# Patient Record
Sex: MALE | Race: WHITE | Employment: UNEMPLOYED | URBAN - METROPOLITAN AREA
[De-identification: names, ages, dates, MRNs, and addresses within clinical notes are randomized per-mention and may not be internally consistent; named-entity substitution may affect disease eponyms.]

---

## 2017-07-21 ENCOUNTER — HOSPITAL ENCOUNTER (EMERGENCY)
Age: 42
Discharge: HOME | End: 2017-07-21
Attending: EMERGENCY MEDICINE
Payer: MEDICAID

## 2017-07-21 VITALS
DIASTOLIC BLOOD PRESSURE: 72 MMHG | RESPIRATION RATE: 16 BRPM | TEMPERATURE: 98.2 F | SYSTOLIC BLOOD PRESSURE: 138 MMHG | OXYGEN SATURATION: 100 % | HEART RATE: 97 BPM

## 2017-07-21 DIAGNOSIS — L23.7 CONTACT DERMATITIS DUE TO POISON IVY: Primary | ICD-10-CM

## 2017-07-21 PROCEDURE — 6370000000 HC RX 637 (ALT 250 FOR IP): Performed by: FAMILY MEDICINE

## 2017-07-21 PROCEDURE — 99282 EMERGENCY DEPT VISIT SF MDM: CPT

## 2017-07-21 RX ORDER — PREDNISONE 10 MG/1
10 TABLET ORAL DAILY
Qty: 5 TABLET | Refills: 0 | Status: SHIPPED | OUTPATIENT
Start: 2017-08-02 | End: 2017-07-21

## 2017-07-21 RX ORDER — PREDNISONE 1 MG/1
5 TABLET ORAL DAILY
Qty: 6 TABLET | Refills: 0 | Status: SHIPPED | OUTPATIENT
Start: 2017-08-08 | End: 2017-07-21

## 2017-07-21 RX ORDER — CALAMINE 8% AND ZINC OXIDE 8% 160 MG/ML
1 LOTION TOPICAL 2 TIMES DAILY
Qty: 1 BOTTLE | Refills: 2 | Status: ON HOLD | OUTPATIENT
Start: 2017-07-21 | End: 2020-08-24

## 2017-07-21 RX ORDER — PREDNISONE 20 MG/1
20 TABLET ORAL DAILY
Qty: 5 TABLET | Refills: 0 | Status: SHIPPED | OUTPATIENT
Start: 2017-07-27 | End: 2017-07-21

## 2017-07-21 RX ORDER — PREDNISONE 20 MG/1
40 TABLET ORAL DAILY
Qty: 10 TABLET | Refills: 0 | Status: SHIPPED | OUTPATIENT
Start: 2017-07-21 | End: 2017-07-21

## 2017-07-21 RX ORDER — PREDNISONE 1 MG/1
5 TABLET ORAL DAILY
Qty: 75 TABLET | Refills: 0 | Status: ON HOLD | OUTPATIENT
Start: 2017-07-21 | End: 2020-08-24 | Stop reason: ALTCHOICE

## 2017-07-21 RX ORDER — PREDNISONE 20 MG/1
40 TABLET ORAL ONCE
Status: COMPLETED | OUTPATIENT
Start: 2017-07-21 | End: 2017-07-21

## 2017-07-21 RX ADMIN — PREDNISONE 40 MG: 20 TABLET ORAL at 08:45

## 2017-07-21 ASSESSMENT — ENCOUNTER SYMPTOMS
SORE THROAT: 0
BACK PAIN: 0
NAUSEA: 0
VOMITING: 0
ABDOMINAL PAIN: 0
HEMOPTYSIS: 0
COUGH: 0
SPUTUM PRODUCTION: 0

## 2017-07-21 ASSESSMENT — PAIN DESCRIPTION - FREQUENCY: FREQUENCY: CONTINUOUS

## 2017-07-21 ASSESSMENT — PAIN DESCRIPTION - PAIN TYPE: TYPE: ACUTE PAIN

## 2017-07-21 ASSESSMENT — PAIN DESCRIPTION - DESCRIPTORS: DESCRIPTORS: ITCHING

## 2017-10-13 ENCOUNTER — OFFICE VISIT (OUTPATIENT)
Dept: FAMILY MEDICINE CLINIC | Age: 42
End: 2017-10-13
Payer: MEDICAID

## 2017-10-13 VITALS
WEIGHT: 187.8 LBS | HEART RATE: 56 BPM | TEMPERATURE: 97.7 F | DIASTOLIC BLOOD PRESSURE: 89 MMHG | HEIGHT: 75 IN | SYSTOLIC BLOOD PRESSURE: 123 MMHG | BODY MASS INDEX: 23.35 KG/M2

## 2017-10-13 DIAGNOSIS — G89.29 CHRONIC PAIN OF BOTH KNEES: Primary | ICD-10-CM

## 2017-10-13 DIAGNOSIS — M25.562 CHRONIC PAIN OF BOTH KNEES: Primary | ICD-10-CM

## 2017-10-13 DIAGNOSIS — L30.1 DYSHIDROTIC ECZEMA: ICD-10-CM

## 2017-10-13 DIAGNOSIS — L70.0 ACNE VULGARIS: ICD-10-CM

## 2017-10-13 DIAGNOSIS — Z23 NEED FOR PNEUMOCOCCAL VACCINATION: ICD-10-CM

## 2017-10-13 DIAGNOSIS — M25.561 CHRONIC PAIN OF BOTH KNEES: Primary | ICD-10-CM

## 2017-10-13 DIAGNOSIS — Z23 NEEDS FLU SHOT: ICD-10-CM

## 2017-10-13 PROCEDURE — 90472 IMMUNIZATION ADMIN EACH ADD: CPT | Performed by: FAMILY MEDICINE

## 2017-10-13 PROCEDURE — 90471 IMMUNIZATION ADMIN: CPT | Performed by: FAMILY MEDICINE

## 2017-10-13 PROCEDURE — 90688 IIV4 VACCINE SPLT 0.5 ML IM: CPT | Performed by: FAMILY MEDICINE

## 2017-10-13 PROCEDURE — 90670 PCV13 VACCINE IM: CPT | Performed by: FAMILY MEDICINE

## 2017-10-13 PROCEDURE — 99213 OFFICE O/P EST LOW 20 MIN: CPT | Performed by: FAMILY MEDICINE

## 2017-10-13 RX ORDER — CLINDAMYCIN PHOSPHATE 11.9 MG/ML
SOLUTION TOPICAL
Qty: 30 ML | Refills: 1 | Status: SHIPPED | OUTPATIENT
Start: 2017-10-13 | End: 2017-10-13 | Stop reason: SDUPTHER

## 2017-10-13 RX ORDER — MELOXICAM 15 MG/1
15 TABLET ORAL DAILY
Qty: 30 TABLET | Refills: 3 | Status: CANCELLED | OUTPATIENT
Start: 2017-10-13

## 2017-10-13 RX ORDER — MELOXICAM 15 MG/1
15 TABLET ORAL DAILY
Qty: 30 TABLET | Refills: 2 | Status: SHIPPED | OUTPATIENT
Start: 2017-10-13 | End: 2018-05-30 | Stop reason: SDUPTHER

## 2017-10-13 RX ORDER — HYDROXYZINE PAMOATE 25 MG/1
25 CAPSULE ORAL 2 TIMES DAILY
COMMUNITY
End: 2020-03-01 | Stop reason: ALTCHOICE

## 2017-10-13 RX ORDER — MIRTAZAPINE 15 MG/1
15 TABLET, FILM COATED ORAL NIGHTLY
COMMUNITY
End: 2020-03-01 | Stop reason: ALTCHOICE

## 2017-10-13 RX ORDER — TRIAMCINOLONE ACETONIDE 0.25 MG/G
CREAM TOPICAL
Qty: 15 G | Refills: 1 | Status: ON HOLD | OUTPATIENT
Start: 2017-10-13 | End: 2020-08-24 | Stop reason: ALTCHOICE

## 2017-10-13 RX ORDER — CLINDAMYCIN PHOSPHATE 11.9 MG/ML
SOLUTION TOPICAL
Qty: 60 ML | Refills: 1 | Status: SHIPPED | OUTPATIENT
Start: 2017-10-13 | End: 2017-11-12

## 2017-10-13 RX ORDER — OLANZAPINE 10 MG/1
10 TABLET ORAL NIGHTLY
COMMUNITY
End: 2020-03-01 | Stop reason: ALTCHOICE

## 2017-10-13 ASSESSMENT — ENCOUNTER SYMPTOMS
BLURRED VISION: 0
ORTHOPNEA: 0
DOUBLE VISION: 0
BLOOD IN STOOL: 0
NAUSEA: 0
VOMITING: 0
EYE PAIN: 0
BACK PAIN: 0
WHEEZING: 0
SHORTNESS OF BREATH: 0
COUGH: 0
SORE THROAT: 0
DIARRHEA: 0
ABDOMINAL PAIN: 0

## 2017-10-13 ASSESSMENT — PATIENT HEALTH QUESTIONNAIRE - PHQ9
SUM OF ALL RESPONSES TO PHQ9 QUESTIONS 1 & 2: 2
SUM OF ALL RESPONSES TO PHQ QUESTIONS 1-9: 2
1. LITTLE INTEREST OR PLEASURE IN DOING THINGS: 1
2. FEELING DOWN, DEPRESSED OR HOPELESS: 1

## 2017-10-13 NOTE — PATIENT INSTRUCTIONS
Thank you for letting us take care of you today. We hope all your questions were addressed. If a question was overlooked or something else comes to mind after you return home, please contact a member of your Care Team listed below. Please make sure you have a routine office visit set up to follow-up on [address]. Your Care Team at [place] [address] is Team #2  Taye Garcia DO (Faculty)  Kai Parikh MD (Resindent)  German Crowder MD (Resident)  April Loza MD (Resident)  May Saucedo MD (Resident)  Vandana Metzger MD (Resident)  ITZ Lind., CHRISTOPHER Paulino, SABIHA Leyva ([address])  Karen Ambrosio RN, ([address] [facility])  Cornelius Salvador, Ph.D., (Behavioral Services)  [facility] (Clinical Pharmacist)     Office phone number: [phone]    If you need to get in right away due to illness, please be advised we have \"Same Day\" appointments available Monday-Friday. Please call us at [phone] option #1 to schedule your \"Same Day\" appointment.

## 2017-10-13 NOTE — PROGRESS NOTES
Visit Information    Have you changed or started any medications since your last visit including any over-the-counter medicines, vitamins, or herbal medicines? no   Have you stopped taking any of your medications? Is so, why? -  no  Are you having any side effects from any of your medications? - no    Have you seen any other physician or provider since your last visit? yes - Psych   Have you had any other diagnostic tests since your last visit?  no   Have you been seen in the emergency room and/or had an admission in a hospital since we last saw you?  yes - [facility]   Have you had your routine dental cleaning in the past 6 months?  no     Do you have an active MyChart account? If no, what is the barrier?   No: Decloined    Patient Care Team:  Jolene Matta DO as PCP - General (Family Medicine)    Medical History Review  Past Medical, Family, and Social History reviewed and does not contribute to the patient presenting condition    Health Maintenance   Topic Date Due    HIV screen  07/19/1988    Pneumococcal med risk (1 of 1 - PPSV23) 07/19/1992    Lipid screen  07/19/2013    Flu vaccine (1) 09/01/2017    DTaP/Tdap/Td vaccine (2 - Td) 06/01/2026

## 2017-10-13 NOTE — PROGRESS NOTES
Medication Refill (Patient has chronic knee pain and is asking for refills. Patient had order for knee Xray last year, but never had done. )    Needs a refill - knee pain - chronic  No instability, clicks or tripping. No prior injury. (arthralgia)    Cream requested - dry skin on lower back edge of left ear - previously treated. Update HM      /89 (Site: Left Arm, Position: Sitting, Cuff Size: Medium Adult)   Pulse 56   Temp 97.7 °F (36.5 °C) (Temporal)   Ht 6' 3\" (1.905 m)   Wt 187 lb 12.8 oz (85.2 kg)   BMI 23.47 kg/m²       Review of Systems   Constitutional: Negative for chills, fever, malaise/fatigue and weight loss. HENT: Negative for congestion, ear pain, hearing loss and sore throat. Eyes: Negative for blurred vision, double vision and pain. Respiratory: Negative for cough, shortness of breath and wheezing. Cardiovascular: Negative for chest pain, palpitations, orthopnea, leg swelling and PND. Gastrointestinal: Negative for abdominal pain, blood in stool, diarrhea, nausea and vomiting. Genitourinary: Negative for dysuria, frequency and hematuria. Musculoskeletal: Positive for myalgias. Negative for back pain and falls. Skin: Positive for itching. Negative for rash. Left ear lobe - itchy, slight swelling behind lobe (history of acne). Neurological: Negative for sensory change, focal weakness, seizures, loss of consciousness and weakness. Endo/Heme/Allergies: Does not bruise/bleed easily. Psychiatric/Behavioral: Negative for suicidal ideas. The patient is not nervous/anxious and does not have insomnia. Physical Exam   Constitutional: He is oriented to person, place, and time and well-developed, well-nourished, and in no distress. No distress. Left cheek area - drying pustular areas, chronic acne  Left ear lobe - posterior aspect - lichenification, hypertrophic, not infected, no cystic area   HENT:   Head: Normocephalic and atraumatic.    Mouth/Throat: Oropharynx is clear and moist.   Eyes: Conjunctivae are normal.   Neck: Normal range of motion. Neck supple. No JVD present. No thyromegaly present. Cardiovascular: Normal rate, regular rhythm and normal heart sounds. No murmur heard. Pulmonary/Chest: Effort normal and breath sounds normal. No respiratory distress. Abdominal: Soft. Bowel sounds are normal. There is no tenderness. There is no guarding. Musculoskeletal: Normal range of motion. He exhibits no edema. Tight jeans - peg leg    TTT along bilateral tibial plateaus, squats to 34%   Lymphadenopathy:     He has no cervical adenopathy. Neurological: He is alert and oriented to person, place, and time. He has normal reflexes. Coordination normal.   Skin: Skin is warm and dry. No rash noted. He is not diaphoretic. No erythema. Psychiatric: Mood, memory, affect and judgment normal.         ASSESSMENT AND PLAN     1. Chronic pain of both knees  meloxicam (MOBIC) 15 MG tablet   2. Dyshidrotic eczema  triamcinolone (KENALOG) 0.025 % cream   3. Needs flu shot  INFLUENZA, QUADV, 6 MO AND OLDER, IM, MDV, 0.5ML (FLULAVAL QUADV)   4. Need for pneumococcal vaccination  Pneumococcal conjugate vaccine 13-valent IM (PREVNAR 13)   5. Acne vulgaris  clindamycin (CLEOCIN-T) 1 % external solution           1. Chronic pain of both knees    - meloxicam (MOBIC) 15 MG tablet; Take 1 tablet by mouth daily  Dispense: 30 tablet; Refill: 2    2. Dyshidrotic eczema    - triamcinolone (KENALOG) 0.025 % cream; Apply topically 2 times daily. Dispense: 15 g; Refill: 1    3. Needs flu shot    - INFLUENZA, QUADV, 6 MO AND OLDER, IM, MDV, 0.5ML (FLULAVAL QUADV)    4. Need for pneumococcal vaccination    - Pneumococcal conjugate vaccine 13-valent IM (PREVNAR 13)    5. Acne vulgaris    - clindamycin (CLEOCIN-T) 1 % external solution; Apply topically 2 times daily. Dispense: 30 mL; Refill: 1    The patient is asked to return in 2 - 6 months.               Electronically signed by

## 2018-05-30 ENCOUNTER — OFFICE VISIT (OUTPATIENT)
Dept: FAMILY MEDICINE CLINIC | Age: 43
End: 2018-05-30
Payer: MEDICAID

## 2018-05-30 VITALS
SYSTOLIC BLOOD PRESSURE: 114 MMHG | HEART RATE: 64 BPM | TEMPERATURE: 98.1 F | BODY MASS INDEX: 21.81 KG/M2 | DIASTOLIC BLOOD PRESSURE: 73 MMHG | HEIGHT: 75 IN | WEIGHT: 175.4 LBS

## 2018-05-30 DIAGNOSIS — M25.561 CHRONIC PAIN OF BOTH KNEES: Primary | ICD-10-CM

## 2018-05-30 DIAGNOSIS — G89.29 CHRONIC PAIN OF BOTH KNEES: Primary | ICD-10-CM

## 2018-05-30 DIAGNOSIS — M25.562 CHRONIC PAIN OF BOTH KNEES: Primary | ICD-10-CM

## 2018-05-30 PROCEDURE — G8427 DOCREV CUR MEDS BY ELIG CLIN: HCPCS | Performed by: INTERNAL MEDICINE

## 2018-05-30 PROCEDURE — 99213 OFFICE O/P EST LOW 20 MIN: CPT | Performed by: INTERNAL MEDICINE

## 2018-05-30 PROCEDURE — G8420 CALC BMI NORM PARAMETERS: HCPCS | Performed by: INTERNAL MEDICINE

## 2018-05-30 PROCEDURE — 4004F PT TOBACCO SCREEN RCVD TLK: CPT | Performed by: INTERNAL MEDICINE

## 2018-05-30 RX ORDER — MELOXICAM 15 MG/1
15 TABLET ORAL DAILY
Qty: 30 TABLET | Refills: 2 | Status: SHIPPED | OUTPATIENT
Start: 2018-05-30 | End: 2018-08-23 | Stop reason: SDUPTHER

## 2018-05-30 RX ORDER — ACETAMINOPHEN 500 MG
1000 TABLET ORAL EVERY 6 HOURS PRN
Qty: 120 TABLET | Refills: 2 | Status: SHIPPED | OUTPATIENT
Start: 2018-05-30 | End: 2018-08-23 | Stop reason: SDUPTHER

## 2018-05-30 ASSESSMENT — ENCOUNTER SYMPTOMS
COUGH: 0
SHORTNESS OF BREATH: 0

## 2018-06-17 ENCOUNTER — HOSPITAL ENCOUNTER (EMERGENCY)
Age: 43
Discharge: LEFT BEFORE DISCHARGE | End: 2018-06-17
Attending: EMERGENCY MEDICINE
Payer: MEDICAID

## 2018-06-17 ENCOUNTER — APPOINTMENT (OUTPATIENT)
Dept: GENERAL RADIOLOGY | Age: 43
End: 2018-06-17
Payer: MEDICAID

## 2018-06-17 VITALS
RESPIRATION RATE: 16 BRPM | TEMPERATURE: 98.1 F | HEART RATE: 98 BPM | WEIGHT: 175 LBS | BODY MASS INDEX: 21.87 KG/M2 | DIASTOLIC BLOOD PRESSURE: 71 MMHG | OXYGEN SATURATION: 95 % | SYSTOLIC BLOOD PRESSURE: 112 MMHG
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
## 2018-06-17 DIAGNOSIS — S80.02XA CONTUSION OF LEFT KNEE, INITIAL ENCOUNTER: Primary | ICD-10-CM

## 2018-06-17 PROCEDURE — 99283 EMERGENCY DEPT VISIT LOW MDM: CPT

## 2018-06-17 ASSESSMENT — ENCOUNTER SYMPTOMS
TROUBLE SWALLOWING: 0
COUGH: 0
VOMITING: 0
DIARRHEA: 0
SORE THROAT: 0
WHEEZING: 0
SHORTNESS OF BREATH: 0
FACIAL SWELLING: 0
BLOOD IN STOOL: 0
ABDOMINAL PAIN: 0
NAUSEA: 0

## 2018-06-17 ASSESSMENT — PAIN DESCRIPTION - LOCATION: LOCATION: KNEE

## 2018-06-17 ASSESSMENT — PAIN DESCRIPTION - ORIENTATION: ORIENTATION: LEFT

## 2018-06-17 ASSESSMENT — PAIN DESCRIPTION - FREQUENCY: FREQUENCY: CONTINUOUS

## 2018-06-17 ASSESSMENT — PAIN DESCRIPTION - PAIN TYPE: TYPE: ACUTE PAIN

## 2018-06-17 ASSESSMENT — PAIN SCALES - GENERAL: PAINLEVEL_OUTOF10: 8

## 2018-06-17 ASSESSMENT — PAIN DESCRIPTION - DESCRIPTORS: DESCRIPTORS: SHARP

## 2018-08-23 DIAGNOSIS — M25.562 CHRONIC PAIN OF BOTH KNEES: ICD-10-CM

## 2018-08-23 DIAGNOSIS — G89.29 CHRONIC PAIN OF BOTH KNEES: ICD-10-CM

## 2018-08-23 DIAGNOSIS — M25.561 CHRONIC PAIN OF BOTH KNEES: ICD-10-CM

## 2018-08-23 RX ORDER — MELOXICAM 15 MG/1
15 TABLET ORAL DAILY
Qty: 30 TABLET | Refills: 0 | Status: SHIPPED | OUTPATIENT
Start: 2018-08-23

## 2018-08-23 RX ORDER — ACETAMINOPHEN 500 MG
1000 TABLET ORAL EVERY 6 HOURS PRN
Qty: 120 TABLET | Refills: 0 | Status: SHIPPED | OUTPATIENT
Start: 2018-08-23 | End: 2018-11-15 | Stop reason: SDUPTHER

## 2018-08-23 NOTE — TELEPHONE ENCOUNTER
Medications are on med list please review and address    Please address the medication refill and close the encounter. If I can be of assistance, please route to the applicable pool. Thank you. Last visit:n/a  Last Med refill:n/a    Next Visit Date:  No future appointments.     Health Maintenance   Topic Date Due    HIV screen  07/19/1988    Pneumococcal med risk (1 of 1 - PPSV23) 07/19/1992    Lipid screen  07/19/2013    Flu vaccine (1) 09/01/2018    DTaP/Tdap/Td vaccine (2 - Td) 06/01/2026       No results found for: LABA1C          ( goal A1C is < 7)   No results found for: LABMICR  No results found for: LDLCHOLESTEROL, LDLCALC    (goal LDL is <100)   AST (U/L)   Date Value   06/01/2015 36     ALT (U/L)   Date Value   06/01/2015 25     BUN (mg/dL)   Date Value   06/01/2015 9     BP Readings from Last 3 Encounters:   06/17/18 112/71   05/30/18 114/73   10/13/17 123/89          (goal 120/80)    All Future Testing planned in [facility]  Lab Frequency Next Occurrence   XR KNEE RIGHT (3 VIEWS) Once 08/30/2018               Patient Active Problem List:     Major depressive disorder, recurrent episode, severe ([address]     Alcohol dependence ([address]     Cannabis abuse

## 2018-11-14 DIAGNOSIS — G89.29 CHRONIC PAIN OF BOTH KNEES: ICD-10-CM

## 2018-11-14 DIAGNOSIS — M25.561 CHRONIC PAIN OF BOTH KNEES: ICD-10-CM

## 2018-11-14 DIAGNOSIS — M25.562 CHRONIC PAIN OF BOTH KNEES: ICD-10-CM

## 2018-11-15 RX ORDER — ACETAMINOPHEN 500 MG
1000 TABLET ORAL EVERY 6 HOURS PRN
Qty: 120 TABLET | Refills: 3 | Status: ON HOLD | OUTPATIENT
Start: 2018-11-15 | End: 2020-08-24

## 2018-12-24 ENCOUNTER — TELEPHONE (OUTPATIENT)
Dept: FAMILY MEDICINE CLINIC | Age: 43
End: 2018-12-24

## 2018-12-24 DIAGNOSIS — Z79.1 NSAID LONG-TERM USE: Primary | ICD-10-CM

## 2019-05-23 DIAGNOSIS — G89.29 CHRONIC PAIN OF BOTH KNEES: ICD-10-CM

## 2019-05-23 DIAGNOSIS — M25.562 CHRONIC PAIN OF BOTH KNEES: ICD-10-CM

## 2019-05-23 DIAGNOSIS — M25.561 CHRONIC PAIN OF BOTH KNEES: ICD-10-CM

## 2019-05-23 RX ORDER — MELOXICAM 15 MG/1
15 TABLET ORAL DAILY
Qty: 30 TABLET | Refills: 0 | OUTPATIENT
Start: 2019-05-23

## 2019-05-31 ENCOUNTER — HOSPITAL ENCOUNTER (EMERGENCY)
Age: 44
Discharge: HOME | End: 2019-06-01
Attending: EMERGENCY MEDICINE
Payer: MEDICAID

## 2019-05-31 DIAGNOSIS — F10.929 ACUTE ALCOHOLIC INTOXICATION WITH COMPLICATION (HCC): Primary | ICD-10-CM

## 2019-05-31 LAB
ETHANOL PERCENT: 0.4 %
ETHANOL: 404 MG/DL

## 2019-05-31 PROCEDURE — 99285 EMERGENCY DEPT VISIT HI MDM: CPT

## 2019-05-31 PROCEDURE — 96372 THER/PROPH/DIAG INJ SC/IM: CPT

## 2019-05-31 PROCEDURE — 6360000002 HC RX W HCPCS: Performed by: NURSE PRACTITIONER

## 2019-05-31 PROCEDURE — G0480 DRUG TEST DEF 1-7 CLASSES: HCPCS

## 2019-05-31 RX ORDER — HALOPERIDOL 5 MG/ML
5 INJECTION INTRAMUSCULAR ONCE
Status: COMPLETED | OUTPATIENT
Start: 2019-05-31 | End: 2019-05-31

## 2019-05-31 RX ADMIN — HALOPERIDOL LACTATE 5 MG: 5 INJECTION, SOLUTION INTRAMUSCULAR at 18:04

## 2019-05-31 ASSESSMENT — ENCOUNTER SYMPTOMS
ABDOMINAL PAIN: 0
SHORTNESS OF BREATH: 0

## 2019-05-31 NOTE — ED NOTES
Pt removed self from cardiac monitor, continuous pulse ox, and NBP      Ana Resendez RN  05/31/19 5113

## 2019-05-31 NOTE — ED PROVIDER NOTES
[facility] ED  Emergency Department Encounter  Mid Level Provider     Pt Name: Yanet Stovall  MRN: [id number]  [place] 1975  Date of evaluation: 5/31/19  PCP:  Olegario Palma MD    CHIEF COMPLAINT       Chief Complaint   Patient presents with    Suicidal    Alcohol Intoxication       HISTORY OF PRESENT ILLNESS  (Location/Symptom, Timing/Onset,Context/Setting, Quality, Duration, Modifying Factors, Severity.)      Yanet Stovall is a 37 y.o. male who presents with acute alcohol intoxication. Patient found [place] the [place] intoxicated by police. He please reports he reported he was suicidal.  I arrived to the patient's bedside he denies saying this. Denies physical complaint. Clinically intoxicated. Will follow commands. PAST MEDICAL /SURGICAL / SOCIAL / FAMILY HISTORY      has a past medical history of Bipolar 1 disorder ([facility] [address] [address] and Depression. has no past surgical history on file.     Social History     Socioeconomic History    Marital status: Single     Spouse name: Not on file    Number of children: Not on file    Years of education: Not on file    Highest education level: Not on file   Occupational History    Not on file   Social Needs    Financial resource strain: Not on file    Food insecurity:     Worry: Not on file     Inability: Not on file    Transportation needs:     Medical: Not on file     Non-medical: Not on file   Tobacco Use    Smoking status: Current Every Day Smoker     Types: Cigarettes    Smokeless tobacco: Never Used   Substance and Sexual Activity    Alcohol use: Yes     Comment: daily     Drug use: Yes     Types: Marijuana    Sexual activity: Not on file   Lifestyle    Physical activity:     Days per week: Not on file     Minutes per session: Not on file    Stress: Not on file   Relationships    Social connections:     Talks on phone: Not on file     Gets together: Not on file     Attends Mandaen service: Not on file     Active member of club or organization: Not on file     Attends meetings of clubs or organizations: Not on file     Relationship status: Not on file    Intimate partner violence:     Fear of current or ex partner: Not on file     Emotionally abused: Not on file     Physically abused: Not on file     Forced sexual activity: Not on file   Other Topics Concern    Not on file   Social History Narrative    Not on file       Family History   Problem Relation Age of Onset    Heart Disease Father        Allergies:  Codeine; Penicillins; and Seasonal    Home Medications:  Prior to Admission medications    Medication Sig Start Date End Date Taking? Authorizing Provider   MAPAP 500 MG tablet TAKE 2 TABLETS BY MOUTH EVERY 6 HOURS AS NEEDED FOR PAIN 11/15/18   Victor Manuel Trevizo MD   meloxicam (MOBIC) 15 MG tablet TAKE 1 TABLET BY MOUTH DAILY 8/23/18   Victor Manuel Trevizo MD   triamcinolone (KENALOG) 0.025 % cream Apply topically 2 times daily. 10/13/17   Slim Oas, DO   hydrOXYzine (VISTARIL) 25 MG capsule Take 25 mg by mouth 2 times daily    Historical Provider, MD   OLANZapine (ZYPREXA) 10 MG tablet Take 10 mg by mouth nightly    Historical Provider, MD   mirtazapine (REMERON) 15 MG tablet Take 15 mg by mouth nightly    Historical Provider, MD MARIAELENA Michaud LOTN Apply 1 Act topically 2 times daily 7/21/17   Josiah Fiore MD   predniSONE (DELTASONE) 5 MG tablet Take 1 tablet by mouth daily Take 8 tablets for 5 days then  Take 4 tablets for 5 days then  Take 2 tablets for 5 days then  Take 1 tablet for 5 days. 7/21/17   Josiah Fiore MD   traZODone (DESYREL) 50 MG tablet Take 50 mg by mouth nightly. Historical Provider, MD   sertraline (ZOLOFT) 50 MG tablet Take 1 tablet by mouth daily. Patient taking differently: Take 50 mg by mouth 2 times daily  1/25/15   Gil Robles MD       REVIEW OF SYSTEMS    (2-9 systems for level 4, 10 or more for level 5)      Review of Systems   Respiratory: Negative for shortness of breath.     Cardiovascular: Negative for chest pain. Gastrointestinal: Negative for abdominal pain. Psychiatric/Behavioral: Positive for behavioral problems (Etoh misuse). Negative for suicidal ideas. PHYSICALEXAM   (upto 7 for level 4, 8 or more for level 5)      INITIAL VITALS:  height is 6' 3\" (1.905 m) and weight is 185 lb (83.9 kg). His temperature is 97.7 °F (36.5 °C). His blood pressure is 115/59 (abnormal) and his pulse is 76. His respiration is 20 and oxygen saturation is 93%. Physical Exam   Constitutional: He is oriented to person, place, and time. He appears well-developed and well-nourished. No distress. HENT:   Head: Normocephalic. Eyes: Pupils are equal, round, and reactive to light. Neck: Normal range of motion. Neck supple. Cardiovascular: Regular rhythm. Tachycardia   Pulmonary/Chest: Effort normal and breath sounds normal. No respiratory distress. Abdominal: Soft. There is no tenderness. Musculoskeletal: Normal range of motion. Neurological: He is alert and oriented to person, place, and time. Skin: Skin is warm and dry. Capillary refill takes less than 2 seconds. Psychiatric: He has a normal mood and affect. His behavior is normal. Judgment and thought content normal.   Nursing note and vitals reviewed. DIFFERENTIAL  DIAGNOSIS   Intoxication, suicidal ideation    PLAN (LABS / IMAGING / EKG):  Orders Placed This Encounter   Procedures    ETHANOL    Inpatient consult to Social Work       MEDICATIONS ORDERED:  Orders Placed This Encounter   Medications    haloperidol lactate (HALDOL) injection 5 mg       Controlled Substances Monitoring:      DIAGNOSTIC RESULTS / EMERGENCY DEPARTMENT COURSE / MDM     RADIOLOGY:   I directly visualized(with the attending physician) the following  images and reviewed the radiologist interpretations:  No results found.     No orders to display       LABS:  Results for orders placed or performed during the hospital encounter of 05/31/19   ETHANOL   Result

## 2019-05-31 NOTE — ED PROVIDER NOTES
[address] [facility]     Emergency Department     Faculty Attestation    I performed a history and physical examination of the patient and discussed management with the resident. I reviewed the residents note and agree with the documented findings including all diagnostic interpretations and plan of care. Any areas of disagreement are noted on the chart. I was personally present for the key portions of any procedures. I have documented in the chart those procedures where I was not present during the key portions. I have reviewed the emergency nurses triage note. I agree with the chief complaint, past medical history, past surgical history, allergies, medications, social and family history as documented unless otherwise noted below. Documentation of the HPI, Physical Exam and Medical Decision Making performed by scribes is based on my personal performance of the HPI, PE and MDM. For Physician Assistant/ Nurse Practitioner cases/documentation I have personally evaluated this patient and have completed at least one if not all key elements of the E/M (history, physical exam, and MDM). Additional findings are as noted. Primary Care Physician: Elise Awad MD    History: This is a 37 y.o. male who presents to the Emergency Department with complaint of suicidal ideation, alcohol intoxication. Was picked up by police at that time reported he was suicidal.  He cannot tell me she suicidal at this time due to his intoxicated status. Unable to obtain significant history from the patient as he is not able to be directed in conversation. Physical:     height is 6' 3\" (1.905 m) and weight is 185 lb (83.9 kg). His temperature is 97.7 °F (36.5 °C). His blood pressure is 150/98 (abnormal) and his pulse is 120. His respiration is 24 and oxygen saturation is 100%.     37 y.o. male significant slurring of speech, smells of alcohol, difficult to direct in conversation, no signs of trauma on skin exam, cardiac exam regular rate and rhythm no murmurs rubs gallops, pulmonary clear bilaterally abdomen is soft nontender nondistended.     Impression: Alcohol intoxication    Plan: Alcohol level, reassess      Tiff Darby MD  Attending Emergency Physician        Shaunna Skiff, MD  05/31/19 7217

## 2019-05-31 NOTE — ED PROVIDER NOTES
[address] ED  Emergency Department  Emergency Medicine Resident Sign-out     Care of Yunier Pinon was assumed from NP Andrew Castellanos and is being seen for Suicidal and Alcohol Intoxication  . The patient's initial evaluation and plan have been discussed with the prior provider who initially evaluated the patient. EMERGENCY DEPARTMENT COURSE / MEDICAL DECISION MAKING:       MEDICATIONS GIVEN:  Orders Placed This Encounter   Medications    haloperidol lactate (HALDOL) injection 5 mg       LABS / RADIOLOGY:     Labs Reviewed   ETHANOL - Abnormal; Notable for the following components:       Result Value    Ethanol 404 (*)     Ethanol percent 0.404 (*)     All other components within normal limits       No results found. RECENT VITALS:     Temp: 97.7 °F (36.5 °C),  Pulse: 102, Resp: 20, BP: 106/72, SpO2: 100 %    This patient is a 37 y.o. Male with intoxicated, aggitated, found by PD in public, known alcoholic. ETOH level 400, reeval 3 am, no other complaints, no trauma fall, not CP SOB, abdominal pain, VSS, maintaining airway     ED Course as of [place] 01 0115   Fri May 31, 2019   2119 On recheck resting comfortably vitals WNL, protecting airway    [EF]   Sat Jun 01, 2019   0105 Pt resting comfortably, social work to assess at 3 am sober time. [EF]      ED Course User Index  [EF] Cassidy Mabry DO       Hand off to night resdinet    OUTSTANDING TASKS / RECOMMENDATIONS:    1. reeval sober time 3 am       FINAL IMPRESSION:     1. Acute alcoholic intoxication with complication ([facility] [address]        DISPOSITION:         DISPOSITION:  [x]  Discharge- pend social work   []  Transfer -    []  Admission -     []  Against Medical Advice   []  Eloped   FOLLOW-UP: No follow-up provider specified.    DISCHARGE MEDICATIONS: New Prescriptions    No medications on file           Dinesh Knight DO  Emergency Medicine Resident  Silas Knight [place]  Resident  06/01/19 7908

## 2019-06-01 VITALS
OXYGEN SATURATION: 93 % | WEIGHT: 185 LBS | SYSTOLIC BLOOD PRESSURE: 115 MMHG | TEMPERATURE: 97.7 F | HEIGHT: 75 IN | RESPIRATION RATE: 20 BRPM | DIASTOLIC BLOOD PRESSURE: 59 MMHG | HEART RATE: 76 BPM | BODY MASS INDEX: 23 KG/M2

## 2019-06-01 NOTE — ED PROVIDER NOTES
[address]  ED  Emergency Department  Emergency Medicine Resident Sign-out     Care of Lianna Turcios was assumed from Dr. Blade Golden and is being seen for Suicidal and Alcohol Intoxication  . The patient's initial evaluation and plan have been discussed with the prior provider who initially evaluated the patient. EMERGENCY DEPARTMENT COURSE / MEDICAL DECISION MAKING:       MEDICATIONS GIVEN:  ED Medication Orders (From admission, onward)    Start Ordered     Status Ordering Provider    05/31/19 1800 05/31/19 1759  haloperidol lactate (HALDOL) injection 5 mg  ONCE      Last MAR action:  Given - by Bebeto Yin on 05/31/19 at [address] / RADIOLOGY:     Labs Reviewed   ETHANOL - Abnormal; Notable for the following components:       Result Value    Ethanol 404 (*)     Ethanol percent 0.404 (*)     All other components within normal limits       No results found. RECENT VITALS:     Temp: 97.7 °F (36.5 °C),  Pulse: 102, Resp: 20, BP: 100/62, SpO2: 95 %    This patient is a 37 y.o. Male with found wandering by [employer]. +EtOH. Suicidal ideation. EtOH 404. Sober time 3am. Social work consulted to reassess when sober    Patient reevaluated by social work when he was medically sober. Patient states that his only intention was to get drunk yesterday. He denies any current suicidal or homicidal ideation. Denies any auditory or visual hallucinations. OUTSTANDING TASKS / RECOMMENDATIONS:    1. Sober time at [facility]  2. Social work evaluation      FINAL IMPRESSION:     1.  Acute alcoholic intoxication with complication [facility])        DISPOSITION:         DISPOSITION:  [x]  Discharge   []  Transfer -    []  Admission -     []  Against Medical Advice   []  Eloped   FOLLOW-UP: Arthur Cummings MD  [address] [place] [id number]          [facility]  [facility] [place]  [phone]    As needed, If symptoms worsen     DISCHARGE MEDICATIONS: New Prescriptions    No medications on file          Latrell Kaur DO  Emergency Medicine Resident  [address]  Resident  06/01/19 1570

## 2019-06-01 NOTE — ED PROVIDER NOTES
EMERGENCY MEDICINE SIGN-OUT    EMERGENCY DEPARTMENT COURSE:     The patient is extremely intoxicated unalbe to assess the pt for SI until sober will await 3am sober time. Current Medications:   Previous Medications    HYDROXYZINE (VISTARIL) 25 MG CAPSULE    Take 25 mg by mouth 2 times daily    MAPAP 500 MG TABLET    TAKE 2 TABLETS BY MOUTH EVERY 6 HOURS AS NEEDED FOR PAIN    MELOXICAM (MOBIC) 15 MG TABLET    TAKE 1 TABLET BY MOUTH DAILY    MIRTAZAPINE (REMERON) 15 MG TABLET    Take 15 mg by mouth nightly    OLANZAPINE (ZYPREXA) 10 MG TABLET    Take 10 mg by mouth nightly    PREDNISONE (DELTASONE) 5 MG TABLET    Take 1 tablet by mouth daily Take 8 tablets for 5 days then  Take 4 tablets for 5 days then  Take 2 tablets for 5 days then  Take 1 tablet for 5 days. SERTRALINE (ZOLOFT) 50 MG TABLET    Take 1 tablet by mouth daily. TRAZODONE (DESYREL) 50 MG TABLET    Take 50 mg by mouth nightly. TRIAMCINOLONE (KENALOG) 0.025 % CREAM    Apply topically 2 times daily.     V-R CALAMINE LOTN    Apply 1 Act topically 2 times daily       Likely Diagnosis and Pending tests   Pending re eval at 3am    Probable DISPOSITION:       pending    Vesna Howard DO  6/1/2019  1:20 AM      The pt       Vesna Howard DO  06/01/19 0121

## 2019-06-01 NOTE — ED NOTES
Met with pt at bedside. Pt denying any suicidal or homicidal ideation, reports that he recently returned to work and lost his insurance so he stopped attending appointments at [facility] and stopped taking his medications. Educated pt that he can return to [facility] and they will be able to see him and assist him with insurance paperwork and medications in the meantime. Also, gave patient information for [facility] walk in clinic. Pt denies being a daily drinker, states that he planned to drink all day yesterday so he was just doing that with his friend. Pt has no recollection of stating he was suicidal when he got to the hospital. Pt denies any other concerns and reports that he will call his own cab to get home from the hospital, will facilitate transportation if necessary.       Ela Max, [place]  06/01/19 0945

## 2020-03-01 ENCOUNTER — HOSPITAL ENCOUNTER (EMERGENCY)
Age: 45
Discharge: HOME | End: 2020-03-01
Attending: EMERGENCY MEDICINE
Payer: MEDICAID

## 2020-03-01 VITALS
TEMPERATURE: 98.5 F | OXYGEN SATURATION: 98 % | HEART RATE: 96 BPM | HEIGHT: 74 IN | BODY MASS INDEX: 22.96 KG/M2 | SYSTOLIC BLOOD PRESSURE: 116 MMHG | DIASTOLIC BLOOD PRESSURE: 78 MMHG | WEIGHT: 178.9 LBS | RESPIRATION RATE: 18 BRPM

## 2020-03-01 PROCEDURE — 99283 EMERGENCY DEPT VISIT LOW MDM: CPT

## 2020-03-01 PROCEDURE — 10160 PNXR ASPIR ABSC HMTMA BULLA: CPT

## 2020-03-01 RX ORDER — SULFAMETHOXAZOLE AND TRIMETHOPRIM 800; 160 MG/1; MG/1
1 TABLET ORAL 2 TIMES DAILY
Qty: 20 TABLET | Refills: 0 | Status: SHIPPED | OUTPATIENT
Start: 2020-03-01 | End: 2020-03-11

## 2020-03-01 RX ORDER — IBUPROFEN 800 MG/1
800 TABLET ORAL EVERY 8 HOURS PRN
Qty: 15 TABLET | Refills: 0 | Status: SHIPPED | OUTPATIENT
Start: 2020-03-01

## 2020-03-01 RX ORDER — CEPHALEXIN 500 MG/1
500 CAPSULE ORAL 4 TIMES DAILY
Qty: 40 CAPSULE | Refills: 0 | Status: SHIPPED | OUTPATIENT
Start: 2020-03-01 | End: 2020-03-11

## 2020-03-01 ASSESSMENT — ENCOUNTER SYMPTOMS
VOICE CHANGE: 0
VOMITING: 0
NAUSEA: 0
BACK PAIN: 0
SHORTNESS OF BREATH: 0
TROUBLE SWALLOWING: 0
PHOTOPHOBIA: 0
EYE PAIN: 0
COLOR CHANGE: 1
ABDOMINAL PAIN: 0

## 2020-03-01 ASSESSMENT — PAIN DESCRIPTION - DESCRIPTORS: DESCRIPTORS: THROBBING

## 2020-03-01 ASSESSMENT — PAIN DESCRIPTION - PAIN TYPE: TYPE: ACUTE PAIN

## 2020-03-01 ASSESSMENT — PAIN SCALES - GENERAL: PAINLEVEL_OUTOF10: 8

## 2020-03-01 ASSESSMENT — PAIN DESCRIPTION - LOCATION: LOCATION: HEAD

## 2020-03-01 NOTE — ED PROVIDER NOTES
Problem Relation Age of Onset    Heart Disease Father      Family Status   Relation Name Status    Mother  Alive    Father      Sister  Alive    Brother  Alive        SOCIAL HISTORY      reports that he has been smoking cigarettes. He has never used smokeless tobacco. He reports current alcohol use. He reports current drug use. Drug: Marijuana. REVIEW OF SYSTEMS    (2-9 systems for level 4, 10 or more for level 5)     Review of Systems   Constitutional: Negative for chills, diaphoresis, fatigue and fever. HENT: Negative for trouble swallowing and voice change. Eyes: Negative for photophobia, pain and visual disturbance. Respiratory: Negative for shortness of breath. Cardiovascular: Negative for chest pain. Gastrointestinal: Negative for abdominal pain, nausea and vomiting. Musculoskeletal: Negative for arthralgias, back pain, myalgias and neck pain. Skin: Positive for color change and wound. Neurological: Positive for headaches. Negative for dizziness, syncope, facial asymmetry, speech difficulty, weakness, light-headedness and numbness. Psychiatric/Behavioral: Negative for confusion. Except as noted above the remainder of the review of systems was reviewed and negative. PHYSICAL EXAM    (up to 7 for level 4, 8 or more for level 5)     ED Triage Vitals [20 1639]   BP Temp Temp Source Pulse Resp SpO2 Height Weight   116/78 98.5 °F (36.9 °C) Oral 96 18 98 % 6' 2\" (1.88 m) 178 lb 14.4 oz (81.1 kg)     Physical Exam  Vitals signs reviewed. Constitutional:       General: He is not in acute distress. Appearance: He is well-developed. He is not diaphoretic. HENT:      Head: No raccoon eyes or Silva's sign. Right Ear: External ear normal.      Left Ear: External ear normal.      Nose: Nose normal.      Mouth/Throat:      Mouth: Mucous membranes are moist.      Pharynx: Oropharynx is clear.    Eyes:      Extraocular Movements: Extraocular movements intact. Conjunctiva/sclera: Conjunctivae normal.      Pupils: Pupils are equal, round, and reactive to light. Neck:      Musculoskeletal: Neck supple. Cardiovascular:      Rate and Rhythm: Normal rate and regular rhythm. Pulses: Normal pulses. Heart sounds: Normal heart sounds. Pulmonary:      Effort: Pulmonary effort is normal. No respiratory distress. Breath sounds: Normal breath sounds. No wheezing or rales. Musculoskeletal:      Cervical back: He exhibits no tenderness and no pain. Thoracic back: He exhibits no pain. Lumbar back: He exhibits no pain. Skin:     General: Skin is warm and dry. Capillary Refill: Capillary refill takes less than 2 seconds. Findings: Erythema present. No rash. Neurological:      General: No focal deficit present. Mental Status: He is alert and oriented to person, place, and time. GCS: GCS eye subscore is 4. GCS verbal subscore is 5. GCS motor subscore is 6. Cranial Nerves: Cranial nerves are intact. No cranial nerve deficit. Motor: Motor function is intact. No weakness. Coordination: Coordination is intact. Gait: Gait is intact. Gait normal.   Psychiatric:         Behavior: Behavior normal.           EMERGENCY DEPARTMENT COURSE and DIFFERENTIAL DIAGNOSIS/MDM:   Vitals:    Vitals:    03/01/20 1639   BP: 116/78   Pulse: 96   Resp: 18   Temp: 98.5 °F (36.9 °C)   TempSrc: Oral   SpO2: 98%   Weight: 178 lb 14.4 oz (81.1 kg)   Height: 6' 2\" (1.88 m)         CLINICAL DECISION MAKING:  The patient presented alert with a nontoxic appearance and was seen in conjunction with Dr. Radha Paulino. The areas were cleansed with betadine x3. #22G needles were used to aspirate fluid from the areas. approx 1mL of bloody, purulent drainage was aspirated from the two areas. He tolerated it well. Prescriptions were written for motrin, bactrim, and keflex. Follow up with pcp, return to ED if condition worsens.           FINAL

## 2020-03-01 NOTE — LETTER
[facility] ED  [address] [id number]  Phone: [phone]             March 1, 2020    Patient: Isabel Toney   YOB: 1975   Date of Visit: 3/1/2020       To Whom It May Concern:    Isabel Toney was seen and treated in our emergency department on 3/1/2020. Please excuse him from work 3-1-2020 and 3-2-2020.     Sincerely,             Signature:__________________________________

## 2020-08-24 ENCOUNTER — HOSPITAL ENCOUNTER (INPATIENT)
Age: 45
LOS: 8 days | Discharge: HOME | DRG: 469 | End: 2020-09-01
Attending: EMERGENCY MEDICINE | Admitting: INTERNAL MEDICINE
Payer: MEDICAID

## 2020-08-24 ENCOUNTER — APPOINTMENT (OUTPATIENT)
Dept: ULTRASOUND IMAGING | Age: 45
DRG: 469 | End: 2020-08-24
Payer: MEDICAID

## 2020-08-24 ENCOUNTER — APPOINTMENT (OUTPATIENT)
Dept: GENERAL RADIOLOGY | Age: 45
DRG: 469 | End: 2020-08-24
Payer: MEDICAID

## 2020-08-24 ENCOUNTER — APPOINTMENT (OUTPATIENT)
Dept: CT IMAGING | Age: 45
DRG: 469 | End: 2020-08-24
Payer: MEDICAID

## 2020-08-24 PROBLEM — R79.89 ELEVATED LFTS: Status: ACTIVE | Noted: 2020-08-24

## 2020-08-24 PROBLEM — Z72.0 TOBACCO USE: Status: ACTIVE | Noted: 2017-08-13

## 2020-08-24 PROBLEM — M17.0 OSTEOARTHRITIS OF BOTH KNEES: Status: ACTIVE | Noted: 2017-08-13

## 2020-08-24 PROBLEM — F25.1 SCHIZOAFFECTIVE DISORDER, DEPRESSIVE TYPE (HCC): Status: ACTIVE | Noted: 2019-10-23

## 2020-08-24 PROBLEM — N17.9 ACUTE RENAL FAILURE (ARF) (HCC): Status: ACTIVE | Noted: 2020-08-24

## 2020-08-24 PROBLEM — E87.1 HYPONATREMIA: Status: ACTIVE | Noted: 2020-08-24

## 2020-08-24 PROBLEM — D69.6 THROMBOCYTOPENIA (HCC): Status: ACTIVE | Noted: 2020-08-24

## 2020-08-24 LAB
-: ABNORMAL
ABSOLUTE EOS #: 0.28 K/UL (ref 0–0.44)
ABSOLUTE IMMATURE GRANULOCYTE: 0.07 K/UL (ref 0–0.3)
ABSOLUTE LYMPH #: 1 K/UL (ref 1.1–3.7)
ABSOLUTE MONO #: 0.95 K/UL (ref 0.1–1.2)
ALBUMIN SERPL-MCNC: 3.5 G/DL (ref 3.5–5.2)
ALBUMIN SERPL-MCNC: 3.9 G/DL (ref 3.5–5.2)
ALBUMIN/GLOBULIN RATIO: ABNORMAL (ref 1–2.5)
ALBUMIN/GLOBULIN RATIO: ABNORMAL (ref 1–2.5)
ALP BLD-CCNC: 61 U/L (ref 40–129)
ALP BLD-CCNC: 69 U/L (ref 40–129)
ALT SERPL-CCNC: 1101 U/L (ref 5–41)
ALT SERPL-CCNC: 876 U/L (ref 5–41)
AMORPHOUS: ABNORMAL
AMPHETAMINE SCREEN URINE: NEGATIVE
AMYLASE: 22 U/L (ref 28–100)
ANION GAP SERPL CALCULATED.3IONS-SCNC: 22 MMOL/L (ref 9–17)
AST SERPL-CCNC: 100 U/L
AST SERPL-CCNC: 74 U/L
BACTERIA: ABNORMAL
BARBITURATE SCREEN URINE: NEGATIVE
BASOPHILS # BLD: 0 % (ref 0–2)
BASOPHILS ABSOLUTE: 0.03 K/UL (ref 0–0.2)
BENZODIAZEPINE SCREEN, URINE: NEGATIVE
BILIRUB SERPL-MCNC: 0.75 MG/DL (ref 0.3–1.2)
BILIRUB SERPL-MCNC: 0.79 MG/DL (ref 0.3–1.2)
BILIRUBIN DIRECT: 0.41 MG/DL
BILIRUBIN URINE: NEGATIVE
BILIRUBIN, INDIRECT: 0.38 MG/DL (ref 0–1)
BUN BLDV-MCNC: 83 MG/DL (ref 6–20)
BUN BLDV-MCNC: 87 MG/DL (ref 6–20)
BUN/CREAT BLD: 7 (ref 9–20)
BUN/CREAT BLD: 7 (ref 9–20)
BUPRENORPHINE URINE: ABNORMAL
CALCIUM SERPL-MCNC: 8 MG/DL (ref 8.6–10.4)
CALCIUM SERPL-MCNC: 8.5 MG/DL (ref 8.6–10.4)
CANNABINOID SCREEN URINE: NEGATIVE
CASTS UA: ABNORMAL /LPF
CASTS UA: ABNORMAL /LPF
CHLORIDE BLD-SCNC: 75 MMOL/L (ref 98–107)
CHLORIDE BLD-SCNC: 81 MMOL/L (ref 98–107)
CHLORIDE BLD-SCNC: 81 MMOL/L (ref 98–107)
CO2: 16 MMOL/L (ref 20–31)
CO2: 18 MMOL/L (ref 20–31)
CO2: 22 MMOL/L (ref 20–31)
COCAINE METABOLITE, URINE: POSITIVE
COLOR: YELLOW
COMMENT UA: ABNORMAL
COMPLEMENT C3: 70 MG/DL (ref 90–180)
COMPLEMENT C4: 15 MG/DL (ref 10–40)
CREAT SERPL-MCNC: 12.26 MG/DL (ref 0.7–1.2)
CREAT SERPL-MCNC: 12.62 MG/DL (ref 0.7–1.2)
CREATININE URINE: 63.1 MG/DL (ref 39–259)
CRYSTALS, UA: ABNORMAL /HPF
DIFFERENTIAL TYPE: ABNORMAL
EOSINOPHIL,URINE: NORMAL
EOSINOPHILS RELATIVE PERCENT: 3 % (ref 1–4)
EPITHELIAL CELLS UA: ABNORMAL /HPF (ref 0–5)
FIO2: 21
FREE KAPPA/LAMBDA RATIO: 1.24 (ref 0.26–1.65)
GFR AFRICAN AMERICAN: 5 ML/MIN
GFR AFRICAN AMERICAN: 5 ML/MIN
GFR NON-AFRICAN AMERICAN: 4 ML/MIN
GFR NON-AFRICAN AMERICAN: 4 ML/MIN
GFR SERPL CREATININE-BSD FRML MDRD: ABNORMAL ML/MIN/{1.73_M2}
GLOBULIN: ABNORMAL G/DL (ref 1.5–3.8)
GLUCOSE BLD-MCNC: 100 MG/DL (ref 70–99)
GLUCOSE BLD-MCNC: 108 MG/DL (ref 70–99)
GLUCOSE URINE: NEGATIVE
HAV IGM SER IA-ACNC: NONREACTIVE
HCT VFR BLD CALC: 38.6 % (ref 40.7–50.3)
HEMOGLOBIN: 13.8 G/DL (ref 13–17)
HEPATITIS B CORE IGM ANTIBODY: NONREACTIVE
HEPATITIS B SURFACE ANTIGEN: NONREACTIVE
HEPATITIS C ANTIBODY: NONREACTIVE
HIV AG/AB: NONREACTIVE
IMMATURE GRANULOCYTES: 1 %
KAPPA FREE LIGHT CHAINS QNT: 4.06 MG/DL (ref 0.37–1.94)
KETONES, URINE: ABNORMAL
LAMBDA FREE LIGHT CHAINS QNT: 3.28 MG/DL (ref 0.57–2.63)
LEUKOCYTE ESTERASE, URINE: NEGATIVE
LIPASE: 31 U/L (ref 13–60)
LYMPHOCYTES # BLD: 11 % (ref 24–43)
MCH RBC QN AUTO: 32.4 PG (ref 25.2–33.5)
MCHC RBC AUTO-ENTMCNC: 35.8 G/DL (ref 28.4–34.8)
MCV RBC AUTO: 90.6 FL (ref 82.6–102.9)
MDMA URINE: ABNORMAL
METHADONE SCREEN, URINE: NEGATIVE
METHAMPHETAMINE, URINE: ABNORMAL
MONOCYTES # BLD: 10 % (ref 3–12)
MUCUS: ABNORMAL
NEGATIVE BASE EXCESS, ART: 8 (ref 0–2)
NITRITE, URINE: NEGATIVE
NRBC AUTOMATED: 0 PER 100 WBC
O2 DEVICE/FLOW/%: ABNORMAL
OPIATES, URINE: NEGATIVE
OSMOLALITY URINE: 209 MOSM/KG (ref 300–1170)
OTHER OBSERVATIONS UA: ABNORMAL
OXYCODONE SCREEN URINE: NEGATIVE
PATIENT TEMP: ABNORMAL
PDW BLD-RTO: 11.8 % (ref 11.8–14.4)
PH UA: 7 (ref 5–8)
PHENCYCLIDINE, URINE: NEGATIVE
PLATELET # BLD: 119 K/UL (ref 138–453)
PLATELET ESTIMATE: ABNORMAL
PMV BLD AUTO: 10.5 FL (ref 8.1–13.5)
POC HCO3: 15.9 MMOL/L (ref 22–27)
POC O2 SATURATION: 97 %
POC PCO2 TEMP: ABNORMAL MM HG
POC PCO2: 22 MM HG (ref 32–45)
POC PH TEMP: ABNORMAL
POC PH: 7.47 (ref 7.35–7.45)
POC PO2 TEMP: ABNORMAL MM HG
POC PO2: 78 MM HG (ref 75–95)
POSITIVE BASE EXCESS, ART: ABNORMAL (ref 0–2)
POTASSIUM SERPL-SCNC: 3.9 MMOL/L (ref 3.7–5.3)
POTASSIUM SERPL-SCNC: 4.2 MMOL/L (ref 3.7–5.3)
POTASSIUM SERPL-SCNC: 4.4 MMOL/L (ref 3.7–5.3)
PROPOXYPHENE, URINE: ABNORMAL
PROTEIN UA: ABNORMAL
RBC # BLD: 4.26 M/UL (ref 4.21–5.77)
RBC # BLD: ABNORMAL 10*6/UL
RBC UA: ABNORMAL /HPF (ref 0–2)
RENAL EPITHELIAL, UA: ABNORMAL /HPF
SEG NEUTROPHILS: 75 % (ref 36–65)
SEGMENTED NEUTROPHILS ABSOLUTE COUNT: 6.95 K/UL (ref 1.5–8.1)
SODIUM BLD-SCNC: 119 MMOL/L (ref 135–144)
SODIUM BLD-SCNC: 119 MMOL/L (ref 135–144)
SODIUM BLD-SCNC: 121 MMOL/L (ref 135–144)
SODIUM,UR: 43 MMOL/L
SPECIFIC GRAVITY UA: 1.02 (ref 1–1.03)
TCO2 (CALC), ART: 17 MMOL/L (ref 23–28)
TEST INFORMATION: ABNORMAL
TOTAL CK: 202 U/L (ref 39–308)
TOTAL PROTEIN, URINE: 446 MG/DL
TOTAL PROTEIN: 5.7 G/DL (ref 6.4–8.3)
TOTAL PROTEIN: 6.3 G/DL (ref 6.4–8.3)
TRICHOMONAS: ABNORMAL
TRICYCLIC ANTIDEPRESSANTS, UR: ABNORMAL
TSH SERPL DL<=0.05 MIU/L-ACNC: 1.96 MIU/L (ref 0.3–5)
TURBIDITY: ABNORMAL
URINE HGB: ABNORMAL
UROBILINOGEN, URINE: NORMAL
WBC # BLD: 9.3 K/UL (ref 3.5–11.3)
WBC # BLD: ABNORMAL 10*3/UL
WBC UA: ABNORMAL /HPF (ref 0–5)
YEAST: ABNORMAL

## 2020-08-24 PROCEDURE — 76770 US EXAM ABDO BACK WALL COMP: CPT

## 2020-08-24 PROCEDURE — 96374 THER/PROPH/DIAG INJ IV PUSH: CPT

## 2020-08-24 PROCEDURE — 80076 HEPATIC FUNCTION PANEL: CPT

## 2020-08-24 PROCEDURE — 84300 ASSAY OF URINE SODIUM: CPT

## 2020-08-24 PROCEDURE — 80307 DRUG TEST PRSMV CHEM ANLYZR: CPT

## 2020-08-24 PROCEDURE — 86160 COMPLEMENT ANTIGEN: CPT

## 2020-08-24 PROCEDURE — 80048 BASIC METABOLIC PNL TOTAL CA: CPT

## 2020-08-24 PROCEDURE — 86225 DNA ANTIBODY NATIVE: CPT

## 2020-08-24 PROCEDURE — 83516 IMMUNOASSAY NONANTIBODY: CPT

## 2020-08-24 PROCEDURE — 99223 1ST HOSP IP/OBS HIGH 75: CPT | Performed by: INTERNAL MEDICINE

## 2020-08-24 PROCEDURE — 74176 CT ABD & PELVIS W/O CONTRAST: CPT

## 2020-08-24 PROCEDURE — 2580000003 HC RX 258: Performed by: INTERNAL MEDICINE

## 2020-08-24 PROCEDURE — APPSS60 APP SPLIT SHARED TIME 46-60 MINUTES: Performed by: NURSE PRACTITIONER

## 2020-08-24 PROCEDURE — 84156 ASSAY OF PROTEIN URINE: CPT

## 2020-08-24 PROCEDURE — 80074 ACUTE HEPATITIS PANEL: CPT

## 2020-08-24 PROCEDURE — 2580000003 HC RX 258: Performed by: EMERGENCY MEDICINE

## 2020-08-24 PROCEDURE — 6360000002 HC RX W HCPCS: Performed by: NURSE PRACTITIONER

## 2020-08-24 PROCEDURE — 86038 ANTINUCLEAR ANTIBODIES: CPT

## 2020-08-24 PROCEDURE — 74018 RADEX ABDOMEN 1 VIEW: CPT

## 2020-08-24 PROCEDURE — 84155 ASSAY OF PROTEIN SERUM: CPT

## 2020-08-24 PROCEDURE — 83690 ASSAY OF LIPASE: CPT

## 2020-08-24 PROCEDURE — 84443 ASSAY THYROID STIM HORMONE: CPT

## 2020-08-24 PROCEDURE — 6370000000 HC RX 637 (ALT 250 FOR IP): Performed by: NURSE PRACTITIONER

## 2020-08-24 PROCEDURE — 84165 PROTEIN E-PHORESIS SERUM: CPT

## 2020-08-24 PROCEDURE — 2000000000 HC ICU R&B

## 2020-08-24 PROCEDURE — 82803 BLOOD GASES ANY COMBINATION: CPT

## 2020-08-24 PROCEDURE — 81001 URINALYSIS AUTO W/SCOPE: CPT

## 2020-08-24 PROCEDURE — 6360000002 HC RX W HCPCS: Performed by: EMERGENCY MEDICINE

## 2020-08-24 PROCEDURE — 80053 COMPREHEN METABOLIC PANEL: CPT

## 2020-08-24 PROCEDURE — 6360000002 HC RX W HCPCS

## 2020-08-24 PROCEDURE — 82570 ASSAY OF URINE CREATININE: CPT

## 2020-08-24 PROCEDURE — 36415 COLL VENOUS BLD VENIPUNCTURE: CPT

## 2020-08-24 PROCEDURE — 83883 ASSAY NEPHELOMETRY NOT SPEC: CPT

## 2020-08-24 PROCEDURE — 80051 ELECTROLYTE PANEL: CPT

## 2020-08-24 PROCEDURE — 87205 SMEAR GRAM STAIN: CPT

## 2020-08-24 PROCEDURE — 82550 ASSAY OF CK (CPK): CPT

## 2020-08-24 PROCEDURE — 83935 ASSAY OF URINE OSMOLALITY: CPT

## 2020-08-24 PROCEDURE — 36600 WITHDRAWAL OF ARTERIAL BLOOD: CPT

## 2020-08-24 PROCEDURE — C9113 INJ PANTOPRAZOLE SODIUM, VIA: HCPCS | Performed by: EMERGENCY MEDICINE

## 2020-08-24 PROCEDURE — 6370000000 HC RX 637 (ALT 250 FOR IP): Performed by: INTERNAL MEDICINE

## 2020-08-24 PROCEDURE — 87389 HIV-1 AG W/HIV-1&-2 AB AG IA: CPT

## 2020-08-24 PROCEDURE — 82150 ASSAY OF AMYLASE: CPT

## 2020-08-24 PROCEDURE — 99285 EMERGENCY DEPT VISIT HI MDM: CPT

## 2020-08-24 PROCEDURE — 85025 COMPLETE CBC W/AUTO DIFF WBC: CPT

## 2020-08-24 RX ORDER — LORAZEPAM 2 MG/ML
2 INJECTION INTRAMUSCULAR
Status: DISCONTINUED | OUTPATIENT
Start: 2020-08-24 | End: 2020-08-28

## 2020-08-24 RX ORDER — ACETAMINOPHEN 325 MG/1
650 TABLET ORAL EVERY 6 HOURS PRN
Status: DISCONTINUED | OUTPATIENT
Start: 2020-08-24 | End: 2020-08-28 | Stop reason: SDUPTHER

## 2020-08-24 RX ORDER — MORPHINE SULFATE 4 MG/ML
4 INJECTION, SOLUTION INTRAMUSCULAR; INTRAVENOUS ONCE
Status: COMPLETED | OUTPATIENT
Start: 2020-08-25 | End: 2020-08-24

## 2020-08-24 RX ORDER — SODIUM POLYSTYRENE SULFONATE 15 G/60ML
30 SUSPENSION ORAL; RECTAL
Status: ACTIVE | OUTPATIENT
Start: 2020-08-24 | End: 2020-08-24

## 2020-08-24 RX ORDER — LORAZEPAM 2 MG/ML
3 INJECTION INTRAMUSCULAR
Status: DISCONTINUED | OUTPATIENT
Start: 2020-08-24 | End: 2020-08-28

## 2020-08-24 RX ORDER — SODIUM CHLORIDE 9 MG/ML
INJECTION, SOLUTION INTRAVENOUS CONTINUOUS
Status: DISCONTINUED | OUTPATIENT
Start: 2020-08-24 | End: 2020-08-26

## 2020-08-24 RX ORDER — LORAZEPAM 1 MG/1
3 TABLET ORAL
Status: DISCONTINUED | OUTPATIENT
Start: 2020-08-24 | End: 2020-08-28

## 2020-08-24 RX ORDER — LORAZEPAM 1 MG/1
2 TABLET ORAL
Status: DISCONTINUED | OUTPATIENT
Start: 2020-08-24 | End: 2020-08-28

## 2020-08-24 RX ORDER — SODIUM CHLORIDE 0.9 % (FLUSH) 0.9 %
10 SYRINGE (ML) INJECTION EVERY 12 HOURS SCHEDULED
Status: DISCONTINUED | OUTPATIENT
Start: 2020-08-24 | End: 2020-09-01 | Stop reason: HOSPADM

## 2020-08-24 RX ORDER — ACETAMINOPHEN 650 MG/1
650 SUPPOSITORY RECTAL EVERY 6 HOURS PRN
Status: DISCONTINUED | OUTPATIENT
Start: 2020-08-24 | End: 2020-09-01 | Stop reason: HOSPADM

## 2020-08-24 RX ORDER — PANTOPRAZOLE SODIUM 40 MG/10ML
40 INJECTION, POWDER, LYOPHILIZED, FOR SOLUTION INTRAVENOUS ONCE
Status: COMPLETED | OUTPATIENT
Start: 2020-08-24 | End: 2020-08-24

## 2020-08-24 RX ORDER — SODIUM CHLORIDE 9 MG/ML
10 INJECTION INTRAVENOUS ONCE
Status: COMPLETED | OUTPATIENT
Start: 2020-08-24 | End: 2020-08-24

## 2020-08-24 RX ORDER — PROMETHAZINE HYDROCHLORIDE 12.5 MG/1
12.5 TABLET ORAL EVERY 6 HOURS PRN
Status: DISCONTINUED | OUTPATIENT
Start: 2020-08-24 | End: 2020-09-01 | Stop reason: HOSPADM

## 2020-08-24 RX ORDER — SODIUM CHLORIDE 0.9 % (FLUSH) 0.9 %
10 SYRINGE (ML) INJECTION PRN
Status: DISCONTINUED | OUTPATIENT
Start: 2020-08-24 | End: 2020-09-01 | Stop reason: HOSPADM

## 2020-08-24 RX ORDER — LORAZEPAM 2 MG/ML
4 INJECTION INTRAMUSCULAR
Status: DISCONTINUED | OUTPATIENT
Start: 2020-08-24 | End: 2020-08-28

## 2020-08-24 RX ORDER — NICOTINE 21 MG/24HR
1 PATCH, TRANSDERMAL 24 HOURS TRANSDERMAL DAILY PRN
Status: DISCONTINUED | OUTPATIENT
Start: 2020-08-24 | End: 2020-09-01 | Stop reason: HOSPADM

## 2020-08-24 RX ORDER — CYCLOBENZAPRINE HCL 10 MG
10 TABLET ORAL DAILY PRN
COMMUNITY

## 2020-08-24 RX ORDER — FENTANYL CITRATE 50 UG/ML
50 INJECTION, SOLUTION INTRAMUSCULAR; INTRAVENOUS ONCE
Status: DISCONTINUED | OUTPATIENT
Start: 2020-08-24 | End: 2020-08-24

## 2020-08-24 RX ORDER — OLANZAPINE 5 MG/1
10 TABLET ORAL NIGHTLY
Status: DISCONTINUED | OUTPATIENT
Start: 2020-08-24 | End: 2020-09-01 | Stop reason: HOSPADM

## 2020-08-24 RX ORDER — SODIUM CHLORIDE 450 MG/100ML
INJECTION, SOLUTION INTRAVENOUS CONTINUOUS
Status: DISCONTINUED | OUTPATIENT
Start: 2020-08-24 | End: 2020-08-24

## 2020-08-24 RX ORDER — OLANZAPINE 10 MG/1
10 TABLET ORAL NIGHTLY
COMMUNITY
Start: 2020-07-30

## 2020-08-24 RX ORDER — SODIUM POLYSTYRENE SULFONATE 15 G/60ML
15 SUSPENSION ORAL; RECTAL
Status: ACTIVE | OUTPATIENT
Start: 2020-08-24 | End: 2020-08-24

## 2020-08-24 RX ORDER — ONDANSETRON 2 MG/ML
4 INJECTION INTRAMUSCULAR; INTRAVENOUS EVERY 6 HOURS PRN
Status: DISCONTINUED | OUTPATIENT
Start: 2020-08-24 | End: 2020-09-01 | Stop reason: HOSPADM

## 2020-08-24 RX ORDER — SODIUM CHLORIDE 9 MG/ML
INJECTION, SOLUTION INTRAVENOUS CONTINUOUS
Status: DISCONTINUED | OUTPATIENT
Start: 2020-08-24 | End: 2020-08-24

## 2020-08-24 RX ORDER — SODIUM CHLORIDE 9 MG/ML
INJECTION, SOLUTION INTRAVENOUS CONTINUOUS
Status: CANCELLED | OUTPATIENT
Start: 2020-08-24

## 2020-08-24 RX ORDER — 0.9 % SODIUM CHLORIDE 0.9 %
500 INTRAVENOUS SOLUTION INTRAVENOUS ONCE
Status: COMPLETED | OUTPATIENT
Start: 2020-08-24 | End: 2020-08-24

## 2020-08-24 RX ORDER — LORAZEPAM 2 MG/ML
1 INJECTION INTRAMUSCULAR
Status: DISCONTINUED | OUTPATIENT
Start: 2020-08-24 | End: 2020-08-28

## 2020-08-24 RX ORDER — LORAZEPAM 1 MG/1
1 TABLET ORAL
Status: DISCONTINUED | OUTPATIENT
Start: 2020-08-24 | End: 2020-08-28

## 2020-08-24 RX ORDER — HEPARIN SODIUM 5000 [USP'U]/ML
5000 INJECTION, SOLUTION INTRAVENOUS; SUBCUTANEOUS EVERY 8 HOURS SCHEDULED
Status: DISCONTINUED | OUTPATIENT
Start: 2020-08-24 | End: 2020-09-01 | Stop reason: HOSPADM

## 2020-08-24 RX ORDER — THIAMINE MONONITRATE (VIT B1) 100 MG
100 TABLET ORAL DAILY
Status: DISCONTINUED | OUTPATIENT
Start: 2020-08-24 | End: 2020-09-01 | Stop reason: HOSPADM

## 2020-08-24 RX ORDER — ONDANSETRON 2 MG/ML
INJECTION INTRAMUSCULAR; INTRAVENOUS
Status: COMPLETED
Start: 2020-08-24 | End: 2020-08-24

## 2020-08-24 RX ORDER — LORAZEPAM 1 MG/1
4 TABLET ORAL
Status: DISCONTINUED | OUTPATIENT
Start: 2020-08-24 | End: 2020-08-28

## 2020-08-24 RX ADMIN — ONDANSETRON 4 MG: 2 INJECTION INTRAMUSCULAR; INTRAVENOUS at 16:54

## 2020-08-24 RX ADMIN — SODIUM CHLORIDE 500 ML: 0.9 INJECTION, SOLUTION INTRAVENOUS at 11:44

## 2020-08-24 RX ADMIN — ONDANSETRON 4 MG: 2 INJECTION INTRAMUSCULAR; INTRAVENOUS at 12:07

## 2020-08-24 RX ADMIN — SODIUM CHLORIDE: 9 INJECTION, SOLUTION INTRAVENOUS at 23:12

## 2020-08-24 RX ADMIN — HEPARIN SODIUM 5000 UNITS: 5000 INJECTION INTRAVENOUS; SUBCUTANEOUS at 14:05

## 2020-08-24 RX ADMIN — Medication 100 MG: at 14:05

## 2020-08-24 RX ADMIN — OLANZAPINE 10 MG: 5 TABLET, FILM COATED ORAL at 21:35

## 2020-08-24 RX ADMIN — PROMETHAZINE HYDROCHLORIDE 12.5 MG: 12.5 TABLET ORAL at 21:37

## 2020-08-24 RX ADMIN — PANTOPRAZOLE SODIUM 40 MG: 40 INJECTION, POWDER, FOR SOLUTION INTRAVENOUS at 09:35

## 2020-08-24 RX ADMIN — HEPARIN SODIUM 5000 UNITS: 5000 INJECTION INTRAVENOUS; SUBCUTANEOUS at 21:35

## 2020-08-24 RX ADMIN — SODIUM CHLORIDE: 9 INJECTION, SOLUTION INTRAVENOUS at 12:30

## 2020-08-24 RX ADMIN — Medication 10 ML: at 09:35

## 2020-08-24 RX ADMIN — MORPHINE SULFATE 4 MG: 4 INJECTION, SOLUTION INTRAMUSCULAR; INTRAVENOUS at 23:54

## 2020-08-24 RX ADMIN — SODIUM CHLORIDE: 9 INJECTION, SOLUTION INTRAVENOUS at 09:35

## 2020-08-24 ASSESSMENT — ENCOUNTER SYMPTOMS
SHORTNESS OF BREATH: 0
VOMITING: 0
FACIAL SWELLING: 0
COUGH: 0
EYE REDNESS: 0
CONSTIPATION: 0
ABDOMINAL PAIN: 1
EYE DISCHARGE: 0
COLOR CHANGE: 0
DIARRHEA: 0

## 2020-08-24 ASSESSMENT — PAIN SCALES - GENERAL
PAINLEVEL_OUTOF10: 8
PAINLEVEL_OUTOF10: 10

## 2020-08-24 ASSESSMENT — PAIN DESCRIPTION - PAIN TYPE: TYPE: ACUTE PAIN

## 2020-08-24 ASSESSMENT — PAIN DESCRIPTION - DESCRIPTORS: DESCRIPTORS: CRAMPING;SHARP;DISCOMFORT

## 2020-08-24 ASSESSMENT — PAIN DESCRIPTION - LOCATION: LOCATION: ABDOMEN

## 2020-08-24 NOTE — ED PROVIDER NOTES
[address] [place] [address] ED  EMERGENCY DEPARTMENT ENCOUNTER      Pt Name: Manuela Katz  MRN: [id number]  [place] 1975  Date of evaluation: 8/24/2020  Provider: Sherry Porras MD    CHIEF COMPLAINT       Chief Complaint   Patient presents with    Abdominal Pain    Emesis     x 3 days         HISTORY OF PRESENT ILLNESS  (Location/Symptom, Timing/Onset, Context/Setting, Quality, Duration, Modifying Factors, Severity.)   Manuela Katz is a 50 y.o. male who presents to the emergency department for abdominal pain. He rates it as an 8 and its continuous and he points to the upper abdomen. He drinks alcohol frequently and had been drinking a 12 pack a day of beer before this started. He is drink 1 or 2 beers a day over the past few days. No injury. He has been taking Pepto-Bismol and his stools have been black. Nursing Notes were reviewed. ALLERGIES     Codeine; Penicillins; and Seasonal    CURRENT MEDICATIONS       Previous Medications    IBUPROFEN (ADVIL;MOTRIN) 800 MG TABLET    Take 1 tablet by mouth every 8 hours as needed for Pain or Fever    MAPAP 500 MG TABLET    TAKE 2 TABLETS BY MOUTH EVERY 6 HOURS AS NEEDED FOR PAIN    MELOXICAM (MOBIC) 15 MG TABLET    TAKE 1 TABLET BY MOUTH DAILY    OLANZAPINE (ZYPREXA) 10 MG TABLET        PREDNISONE (DELTASONE) 5 MG TABLET    Take 1 tablet by mouth daily Take 8 tablets for 5 days then  Take 4 tablets for 5 days then  Take 2 tablets for 5 days then  Take 1 tablet for 5 days. SERTRALINE (ZOLOFT) 50 MG TABLET    Take 1 tablet by mouth daily. TRIAMCINOLONE (KENALOG) 0.025 % CREAM    Apply topically 2 times daily. V-R CALAMINE LOTN    Apply 1 Act topically 2 times daily       PAST MEDICAL HISTORY         Diagnosis Date    Bipolar 1 disorder ([facility] [address]     Depression        SURGICAL HISTORY     History reviewed. No pertinent surgical history.       FAMILY HISTORY           Problem Relation Age of Onset    Heart Disease Father      Family Status   Relation Name Status    Mother  Alive    Father      Sister  Alive    Brother  Alive        SOCIAL HISTORY      reports that he has been smoking cigarettes. He has been smoking about 1.00 pack per day. He has never used smokeless tobacco. He reports current alcohol use. He reports previous drug use. Drug: Marijuana. REVIEW OF SYSTEMS    (2-9 systems for level 4, 10 or more for level 5)     Review of Systems   Constitutional: Negative for chills, fatigue and fever. HENT: Negative for congestion, ear discharge and facial swelling. Eyes: Negative for discharge and redness. Respiratory: Negative for cough and shortness of breath. Cardiovascular: Negative for chest pain. Gastrointestinal: Positive for abdominal pain. Negative for constipation, diarrhea and vomiting. Genitourinary: Negative for dysuria and hematuria. Musculoskeletal: Negative for arthralgias. Skin: Negative for color change and rash. Neurological: Negative for syncope, numbness and headaches. Hematological: Negative for adenopathy. Psychiatric/Behavioral: Negative for confusion. The patient is not nervous/anxious. Except as noted above the remainder of the review of systems was reviewed and negative. PHYSICAL EXAM    (up to 7 for level 4, 8 or more for level 5)     Vitals:    20 0921   BP: (!) 147/97   Pulse: 68   Resp: 19   Temp: 98.5 °F (36.9 °C)   TempSrc: Oral   SpO2: 99%   Weight: 174 lb (78.9 kg)   Height: 6' 2\" (1.88 m)       Physical Exam  Vitals signs reviewed. Constitutional:       General: He is not in acute distress. Appearance: He is well-developed. He is not diaphoretic. HENT:      Head: Normocephalic and atraumatic. Eyes:      General: No scleral icterus. Right eye: No discharge. Left eye: No discharge. Neck:      Musculoskeletal: Neck supple. Cardiovascular:      Rate and Rhythm: Normal rate and regular rhythm.    Pulmonary:      Effort: Pulmonary effort is normal. No respiratory distress. Breath sounds: Normal breath sounds. No stridor. No wheezing or rales. Abdominal:      General: There is no distension. Palpations: Abdomen is soft. Tenderness: There is abdominal tenderness. Comments: Tenderness present across the upper abdomen. No rebound or guarding. Musculoskeletal: Normal range of motion. Lymphadenopathy:      Cervical: No cervical adenopathy. Skin:     General: Skin is warm and dry. Findings: No erythema or rash. Neurological:      Mental Status: He is alert and oriented to person, place, and time. Psychiatric:         Behavior: Behavior normal.             DIAGNOSTIC RESULTS     EKG: All EKG's are interpreted by the Emergency Department Physician who either signs or Co-signs this chart in the absence of a cardiologist.    RADIOLOGY:   Non-plain film images such as CT, Ultrasound and MRI are read by the radiologist. Nokori radiographic images are visualized and preliminarily interpreted by the emergency physician with the below findings:    Interpretation per the Radiologist below, if available at the time of this note:    Ct Abdomen Pelvis Wo Contrast    Result Date: 8/24/2020  EXAMINATION: CT OF THE ABDOMEN AND PELVIS WITHOUT CONTRAST 8/24/2020 10:30 am TECHNIQUE: CT of the abdomen and pelvis was performed without the administration of intravenous contrast. Multiplanar reformatted images are provided for review. Dose modulation, iterative reconstruction, and/or weight based adjustment of the mA/kV was utilized to reduce the radiation dose to as low as reasonably achievable. COMPARISON: None HISTORY: ORDERING SYSTEM PROVIDED HISTORY: Abdominal Pain TECHNOLOGIST PROVIDED HISTORY: Abdominal Pain Reason for Exam: Back and abd pain x several days, kidney failure Acuity: Acute Type of Exam: Initial FINDINGS: Lower Chest: There is mild bibasilar atelectasis. Visualized lung bases are otherwise clear.  Organs: Limited unenhanced liver, within normal limits   BASIC METABOLIC PANEL - Abnormal; Notable for the following components:    Glucose 100 (*)     BUN 83 (*)     CREATININE 12.26 (*)     Bun/Cre Ratio 7 (*)     Calcium 8.5 (*)     Sodium 119 (*)     Chloride 75 (*)     Anion Gap 22 (*)     GFR Non- 4 (*)     GFR  5 (*)     All other components within normal limits   CBC WITH AUTO DIFFERENTIAL - Abnormal; Notable for the following components:    Hematocrit 38.6 (*)     MCHC 35.8 (*)     Platelets 050 (*)     Seg Neutrophils 75 (*)     Lymphocytes 11 (*)     Immature Granulocytes 1 (*)     Absolute Lymph # 1.00 (*)     All other components within normal limits   HEPATIC FUNCTION PANEL - Abnormal; Notable for the following components:    ALT 1,101 (*)      (*)     Bilirubin, Direct 0.41 (*)     Total Protein 6.3 (*)     All other components within normal limits   LIPASE   HEPATITIS PANEL, ACUTE   CK   POCT URINALYSIS DIPSTICK       All other labs were within normal range or not returned as of this dictation. EMERGENCY DEPARTMENT COURSE and DIFFERENTIAL DIAGNOSIS/MDM:   Vitals:    Vitals:    08/24/20 0921   BP: (!) 147/97   Pulse: 68   Resp: 19   Temp: 98.5 °F (36.9 °C)   TempSrc: Oral   SpO2: 99%   Weight: 174 lb (78.9 kg)   Height: 6' 2\" (1.88 m)       Orders Placed This Encounter   Medications    AND Linked Order Group     pantoprazole (PROTONIX) injection 40 mg     sodium chloride (PF) 0.9 % injection 10 mL    0.9 % sodium chloride infusion    0.9 % sodium chloride bolus    0.9 % sodium chloride infusion       Medical Decision Making: The patient's creatinine is 12.26. He is given IV fluids and is being admitted. CT abdomen does not show any acute findings to explain the elevated creatinine. Treatment diagnosis and disposition were discussed with the patient. CONSULTS:  IP CONSULT TO HOSPITALIST    PROCEDURES:  None    FINAL IMPRESSION      1.  Acute kidney injury ([facility] [address] DISPOSITION/PLAN   DISPOSITION Decision To Admit 08/24/2020 11:26:49 AM      PATIENT REFERRED TO:   No follow-up provider specified.     DISCHARGE MEDICATIONS:     New Prescriptions    No medications on file         (Please note that portions of this note were completed with a voice recognition program.  Efforts were made to edit the dictations but occasionally words are mis-transcribed.)    Roger Titus MD  Attending Emergency Physician           Roger Titus MD  08/24/20 1124

## 2020-08-24 NOTE — PROGRESS NOTES
Transitions of Care Pharmacy Service   Medication Review    The patient's list of current home medications has been reviewed. Source(s) of information: patient, surescripts    Based on information provided by the above source(s), I have updated the patient's home med list as described below. I changed or updated the following medications on the patient's home medication list:  Discontinued Tylenol 500mg - patient choice  Prednisone 5mg - therapy complete  Zoloft 50mg - list cleanup  Calamine Lotion - list cleanup  Triamcinolone 0.025% Cream - therapy complete     Added Flexeril 10mg - 1QD PRN muscle spasms      Adjusted   Zyprexa 10mg - added sig 1QD     Other Notes Tylenol 500mg - Patient states he stopped taking this medication due to it causing upset stomach - takes ibuprofen or mobic instead for pain  Zyprexa 10mg - Last filled 7/30/20 for a 15 day supply, patient states he hasn't taken in a few days due to not being able to keep medication down - still has medication at home           Please feel free to call me with any questions about this encounter. Thank you. This note will be reviewed and co-signed by the Transitions of Care Pharmacist. The pharmacist will review inpatient orders and contact the physician about any discrepancies. Ashby Sacks, pharmacy technician  Transitions of [facility] [place] [facility]  Phone:  [phone]  Fax: [phone]      Electronically signed by Ashby Sacks on 8/24/2020 at 2:23 PM     Prior to Admission medications    Medication Sig Start Date End Date Taking?  Authorizing Provider   OLANZapine (ZYPREXA) 10 MG tablet Take 10 mg by mouth nightly        cyclobenzaprine (FLEXERIL) 10 MG tablet Take 10 mg by mouth daily as needed for Muscle spasms       ibuprofen (ADVIL;MOTRIN) 800 MG tablet Take 1 tablet by mouth every 8 hours as needed for Pain or Fever       meloxicam (MOBIC) 15 MG tablet TAKE 1 TABLET BY MOUTH DAILY

## 2020-08-24 NOTE — H&P
[facility]    HISTORY AND PHYSICAL EXAMINATION            Date:   8/24/2020  Patient name:  Osito Huynh  Date of admission:  8/24/2020  9:18 AM  MRN:   [id number]  Account:  [de-identified]  YOB: 1975  PCP:    No primary care provider on file. Room:   [address] [place] [address]  Code Status:    Full Code    Chief Complaint:     Chief Complaint   Patient presents with    Abdominal Pain    Emesis     x 3 days       History Obtained From:     patient    History of Present Illness:     Osito Huynh is a 50 y.o.  male who presents with Abdominal Pain and Emesis (x 3 days)  and is admitted to the hospital for the management of Acute renal failure (ARF) ([facility] Patient complains of 8 out of 10  right upper and lower quadrant abdominal pain, nausea and vomiting for the last 3 to 4 days. He has had poor oral intake however has managed to keep some fluids down. He tried taking Pepto-Bismol without relief. He denies any fever, chills, recent sick contacts, chest pain, shortness of breath or diarrhea. He does take NSAIDs occasionally for history of arthritis but states he does not take them daily and has not taken more than recommended. He does report minimal urine output. He is a daily drinker, he drinks 6 beers per day but sometimes more. He denies any similar issues in the past and his previous creatinine has been within normal limits. he denies any drug use other than marijuana. Initial ED evaluation revealed a creatinine of 12.2, BUN  83 sodium of 119, ALT 1101 and .      Past Medical History:     Past Medical History:   Diagnosis Date    Acute renal failure (ARF) ([facility] 8/24/2020    [facility]     Depression     Osteoarthritis of both knees 8/13/2017        Past Surgical History:     Past Surgical History:   Procedure Laterality Date    TONSILLECTOMY          Medications Prior to Admission:     Prior to Admission medications    Medication Sig Start Date End Date Taking? Authorizing Provider   OLANZapine (ZYPREXA) 10 MG tablet Take 10 mg by mouth nightly  7/30/20  Yes Historical Provider, MD   cyclobenzaprine (FLEXERIL) 10 MG tablet Take 10 mg by mouth daily as needed for Muscle spasms   Yes Historical Provider, MD   ibuprofen (ADVIL;MOTRIN) 800 MG tablet Take 1 tablet by mouth every 8 hours as needed for Pain or Fever 3/1/20  Yes CA Rg CNP   meloxicam (MOBIC) 15 MG tablet TAKE 1 TABLET BY MOUTH DAILY 8/23/18  Yes Finesse Morris MD        Allergies:     Codeine; Penicillins; and Seasonal    Social History:     Tobacco:    reports that he has been smoking cigarettes. He has been smoking about 1.00 pack per day. He has never used smokeless tobacco.  Alcohol:      reports current alcohol use. Drug Use:  reports previous drug use. Drug: Marijuana. Family History:     Family History   Problem Relation Age of Onset    Heart Disease Father        Review of Systems:     Positive and Negative as described in HPI. CONSTITUTIONAL:  negative for fevers, chills, sweats, fatigue, weight loss  HEENT:  negative for vision, hearing changes, runny nose, throat pain  RESPIRATORY:  negative for shortness of breath, cough, congestion, wheezing  CARDIOVASCULAR:  negative for chest pain, palpitations  GASTROINTESTINAL: Positive for nausea, vomiting, constipation and abdominal pain.  negative for diarrhea,  change in bowel habits  GENITOURINARY: Positive for oliguria. negative for difficulty of urination, burning with urination, frequency   INTEGUMENT:  negative for rash, skin lesions, easy bruising   HEMATOLOGIC/LYMPHATIC:  negative for swelling/edema   ALLERGIC/IMMUNOLOGIC:  negative for urticaria , itching  ENDOCRINE:  negative increase in drinking, increase in urination, hot or cold intolerance  MUSCULOSKELETAL:  negative joint pains, muscle aches, swelling of joints  NEUROLOGICAL:  negative for headaches, dizziness, lightheadedness, numbness, pain, tingling extremities  BEHAVIOR/PSYCH:  negative for depression, anxiety    Physical Exam:   BP (!) 154/90   Pulse 68   Temp 98.4 °F (36.9 °C) (Oral)   Resp 20   Ht 6' 2\" (1.88 m)   Wt 174 lb (78.9 kg)   SpO2 100%   BMI 22.34 kg/m²   Temp (24hrs), Av.5 °F (36.9 °C), Min:98.4 °F (36.9 °C), Max:98.5 °F (36.9 °C)    No results for input(s): POCGLU in the last 72 hours. Intake/Output Summary (Last 24 hours) at 2020 1530  Last data filed at 2020 1320  Gross per 24 hour   Intake 120 ml   Output --   Net 120 ml       General Appearance:  alert, well appearing, and in no acute distress  Mental status: oriented to person, place, and time  Head:  normocephalic, atraumatic  Eye: no icterus, redness, pupils equal and reactive, extraocular eye movements intact, conjunctiva clear  Ear: normal external ear, no discharge, hearing intact  Nose:  no drainage noted  Mouth: mucous membranes moist  Neck: supple, no carotid bruits, thyroid not palpable  Lungs: Bilateral equal air entry, clear to auscultation, no wheezing, rales or rhonchi, normal effort  Cardiovascular: normal rate, regular rhythm, no murmur, gallop, rub.   Abdomen: Soft, mild tenderness to RUQ, nondistended, normal bowel sounds, no hepatomegaly or splenomegaly  Neurologic: There are no new focal motor or sensory deficits, normal muscle tone and bulk, no abnormal sensation, normal speech, cranial nerves II through XII grossly intact  Skin: No gross lesions, rashes, bruising or bleeding on exposed skin area  Extremities:  peripheral pulses palpable, no pedal edema or calf pain with palpation  Psych: normal affect     Investigations:      Laboratory Testing:  Recent Results (from the past 24 hour(s))   Amylase    Collection Time: 20  9:30 AM   Result Value Ref Range    Amylase 22 (L) 28 - 100 U/L   Basic Metabolic Panel    Collection Time: 20  9:30 AM   Result Value Ref Range    Glucose 100 (H) 70 - 99 mg/dL    BUN 83 (H) 6 - 20 mg/dL    CREATININE 12.26 (HH) 0.70 - 1.20 mg/dL    Bun/Cre Ratio 7 (L) 9 - 20    Calcium 8.5 (L) 8.6 - 10.4 mg/dL    Sodium 119 (LL) 135 - 144 mmol/L    Potassium 3.9 3.7 - 5.3 mmol/L    Chloride 75 (L) 98 - 107 mmol/L    CO2 22 20 - 31 mmol/L    Anion Gap 22 (H) 9 - 17 mmol/L    GFR Non-African American 4 (L) >60 mL/min    GFR African American 5 (L) >60 mL/min    GFR Comment          GFR Staging NOT REPORTED    CBC Auto Differential    Collection Time: 08/24/20  9:30 AM   Result Value Ref Range    WBC 9.3 3.5 - 11.3 k/uL    RBC 4.26 4.21 - 5.77 m/uL    Hemoglobin 13.8 13.0 - 17.0 g/dL    Hematocrit 38.6 (L) 40.7 - 50.3 %    MCV 90.6 82.6 - 102.9 fL    MCH 32.4 25.2 - 33.5 pg    MCHC 35.8 (H) 28.4 - 34.8 g/dL    RDW 11.8 11.8 - 14.4 %    Platelets 556 (L) 750 - 453 k/uL    MPV 10.5 8.1 - 13.5 fL    NRBC Automated 0.0 0.0 per 100 WBC    Differential Type NOT REPORTED     WBC Morphology NOT REPORTED     RBC Morphology NOT REPORTED     Platelet Estimate NOT REPORTED     Seg Neutrophils 75 (H) 36 - 65 %    Lymphocytes 11 (L) 24 - 43 %    Monocytes 10 3 - 12 %    Eosinophils % 3 1 - 4 %    Basophils 0 0 - 2 %    Immature Granulocytes 1 (H) 0 %    Segs Absolute 6.95 1.50 - 8.10 k/uL    Absolute Lymph # 1.00 (L) 1.10 - 3.70 k/uL    Absolute Mono # 0.95 0.10 - 1.20 k/uL    Absolute Eos # 0.28 0.00 - 0.44 k/uL    Basophils Absolute 0.03 0.00 - 0.20 k/uL    Absolute Immature Granulocyte 0.07 0.00 - 0.30 k/uL   Hepatic Function Panel    Collection Time: 08/24/20  9:30 AM   Result Value Ref Range    Alb 3.9 3.5 - 5.2 g/dL    Alkaline Phosphatase 69 40 - 129 U/L    ALT 1,101 (H) 5 - 41 U/L     (H) <40 U/L    Total Bilirubin 0.79 0.3 - 1.2 mg/dL    Bilirubin, Direct 0.41 (H) <0.31 mg/dL    Bilirubin, Indirect 0.38 0.00 - 1.00 mg/dL    Total Protein 6.3 (L) 6.4 - 8.3 g/dL    Globulin NOT REPORTED 1.5 - 3.8 g/dL    Albumin/Globulin Ratio NOT REPORTED 1.0 - 2.5   Lipase    Collection Time: 08/24/20 9:30 AM   Result Value Ref Range    Lipase 31 13 - 60 U/L   CK    Collection Time: 08/24/20  9:30 AM   Result Value Ref Range    Total  39 - 308 U/L   Hepatitis Panel, Acute    Collection Time: 08/24/20  9:30 AM   Result Value Ref Range    Hepatitis B Surface Ag NONREACTIVE NONREACTIVE    Hepatitis C Ab NONREACTIVE NONREACTIVE    Hep B Core Ab, IgM NONREACTIVE NONREACTIVE    Hep A IgM NONREACTIVE NONREACTIVE       Imaging/Diagnostics:  Ct Abdomen Pelvis Wo Contrast    Result Date: 8/24/2020  1. Limited by lack of IV contrast.  No focal inflammatory process identified in the abdomen or pelvis. 2.  Mild scattered nonspecific stranding in the upper mesenteric and omental fat. There is also a tiny amount of free fluid in the pelvis. These findings are nonspecific but could be related to enteritis. 3.  Mild diffuse urinary bladder wall thickening, likely due to incomplete distention. 4.  Multiple small lucencies noted in the bilateral iliac bones. These are nonspecific and could be related to heterogeneous bone mineralization. Other etiologies are not excluded. If the patient has a history of malignancy, consider bone scan for further evaluation. Us Renal Complete    Result Date: 8/24/2020  1. Echogenic kidneys, compatible with medical renal disease. 2. No hydronephrosis. Assessment :      Hospital Problems           Last Modified POA    * (Principal) Acute renal failure (ARF) ([address] 8/24/2020 Yes    Alcohol dependence ([address] 8/24/2020 Yes    Cannabis abuse 8/24/2020 Yes    Bipolar 1 disorder ([address] 8/24/2020 Yes    Tobacco use 8/24/2020 Yes    Schizoaffective disorder, depressive type ([address] 8/24/2020 Yes          Plan:     Patient status inpatient in the Medical ICU    1. Continue selected home meds  2. Avoid nephrotoxic agents  3. Nephrology consult, case discussed with Dr. Preeti Stoner  4. IV fluids for aggressive hydration  5. Monitor labs every 6 hours   6. [facility] protocol  7.  Telemetry

## 2020-08-24 NOTE — CONSULTS
Nephrology Consult Note    Reason for Consult:  ANGELY and hyponatremia   Requesting Physician:  Dr Giovanni Avlia    Chief Complaint:  Nausea, emesis and abdominal pain     History Obtained From:  patient    History of Present Illness: This is a 50 y.o. male who presented to the hospital for evaluation of abdominal pain, nausea vomiting and inability to keep much down for the last 4 days or so. He related the abdominal pain to be somewhere between 7-8 and it is primarily in the right upper quadrant and epigastric area. He has not described any melanotic stools or any hematemesis. He has had multiple episodes of vomiting. Over the last 2 days or so he is only been able to keep some water and interestingly some beer down but no solid food. He apparently was with a group of friends at a hotel 5 days ago and he had meals at that hotel and also a 12 pack of beer. He routinely does drink a sixpack or so. He started complaining of feeling very tired, weak and noticed significant decrease in urine output. Patient has had issues with osteoarthritis of the knees and on a fairly regular basis takes Mobic and occasionally Aleve for knee pain. He took these medications up until 2 days ago. In the emergency room he was noted to have a serum creatinine of 12.2, serum sodium of 119, BUN of 83, potassium of 3.9 and a anion gap of 22. His amylase and lipase were normal.  His AST was 100 his ALT was 1101. His bilirubin is normal  Looking at labs from the database, his prior creatinines have been normal.  His prior sodium levels have been normal just as well. Patient denies any history of gross hematuria. He denies any flank pain or tenderness. He denies any sick contacts. He denied any diarrhea. He has not had any unusual skin rashes. He denied any hemoptysis or pneumonialike symptoms. As mentioned above patient has had history of NSAID use. There is no history of blood or bone marrow disorders.  There History     Socioeconomic History    Marital status: Single     Spouse name: Not on file    Number of children: Not on file    Years of education: Not on file    Highest education level: Not on file   Occupational History    Not on file   Social Needs    Financial resource strain: Not on file    Food insecurity     Worry: Not on file     Inability: Not on file    Transportation needs     Medical: Not on file     Non-medical: Not on file   Tobacco Use    Smoking status: Current Every Day Smoker     Packs/day: 1.00     Types: Cigarettes    Smokeless tobacco: Never Used   Substance and Sexual Activity    Alcohol use: Yes     Comment: daily     Drug use: Not Currently     Types: Marijuana    Sexual activity: Not on file   Lifestyle    Physical activity     Days per week: Not on file     Minutes per session: Not on file    Stress: Not on file   Relationships    Social connections     Talks on phone: Not on file     Gets together: Not on file     Attends Muslim service: Not on file     Active member of club or organization: Not on file     Attends meetings of clubs or organizations: Not on file     Relationship status: Not on file    Intimate partner violence     Fear of current or ex partner: Not on file     Emotionally abused: Not on file     Physically abused: Not on file     Forced sexual activity: Not on file   Other Topics Concern    Not on file   Social History Narrative    Not on file       Family History:   Family History   Problem Relation Age of Onset    Heart Disease Father        Review of Systems:    Constitutional: No fever, no chills, no lethargy, no weakness. HEENT:  No headache, otalgia, itchy eyes, nasal discharge or sore throat. Cardiac:  No chest pain, dyspnea, orthopnea or PND. Chest:              No cough, phlegm or wheezing. Abdomen:  + abdominal pain, +nausea + vomiting. Negative for diarrhea melanotic stools or hematochezia.   No hematemesis  Neuro:  No focal weakness, abnormal movements orseizure like activity. Skin:   No rashes, no itching. :   No hematuria, no pyuria, no dysuria, no flank pain. Extremities:  No swelling or joint pains. Objective:  CURRENT TEMPERATURE:  Temp: 98.4 °F (36.9 °C)  MAXIMUM TEMPERATURE OVER 24HRS:  Temp (24hrs), Av.5 °F (36.9 °C), Min:98.4 °F (36.9 °C), Max:98.5 °F (36.9 °C)    CURRENT RESPIRATORY RATE:  Resp: 20  CURRENT PULSE:  Pulse: 68  CURRENT BLOOD PRESSURE:  BP: (!) 154/90  24HR BLOOD PRESSURE RANGE:  Systolic (52VHX), VYS:117 , Min:147 , PWR:490   ; Diastolic (15UFU), AFD:98, Min:90, Max:97    24HR INTAKE/OUTPUT:      Intake/Output Summary (Last 24 hours) at 2020 1410  Last data filed at 2020 1320  Gross per 24 hour   Intake 120 ml   Output --   Net 120 ml     Patient Vitals for the past 96 hrs (Last 3 readings):   Weight   20 1228 174 lb (78.9 kg)   20 0921 174 lb (78.9 kg)     Physical Exam:  General appearance:Awake, alert, in no acute distress  Skin: warm and dry, no rash or erythema  Eyes: conjunctivae normal and sclera anicteric  ENT: :no thrush no pharyngeal congestion    Neck: no carotid bruit ,no  JVD,no carotid Lymphadenopathy, noThyromegaly   Pulmonary: no wheezing or rhonchi. No rales heard.   Cardiovascular: Normal S1 & S2,  No S3 or  S4, no Pericardial Rub no Murmur   Abdomen: Mild epigastric tenderness on palpation, no rebound, no rigidity, bowel sounds present, no organomegaly,  no ascites  Extremities:no cyanosis, clubbing or edema    Labs:   CBC:  Recent Labs     20  0930   WBC 9.3   RBC 4.26   HGB 13.8   HCT 38.6*   MCV 90.6   MCH 32.4   MCHC 35.8*   RDW 11.8   *   MPV 10.5      BMP:   Recent Labs     20  0930   *   K 3.9   CL 75*   CO2 22   BUN 83*   CREATININE 12.26*   GLUCOSE 100*   CALCIUM 8.5*        Albumin:   Recent Labs     20  0930   LABALBU 3.9           Radiology:  CT scan of the abdomen and pelvis without contrast:  .  1.  Limited by lack of IV contrast.  No focal inflammatory process identified    in the abdomen or pelvis.         2.  Mild scattered nonspecific stranding in the upper mesenteric and omental    fat. Marya Friday is also a tiny amount of free fluid in the pelvis.  These    findings are nonspecific but could be related to enteritis.         3.  Mild diffuse urinary bladder wall thickening, likely due to incomplete    distention.         4.  Multiple small lucencies noted in the bilateral iliac bones.  These are    nonspecific and could be related to heterogeneous bone mineralization       Renal ultrasound:  Kidneys:         The right kidney measures 14.6 cm in length and the left kidney measures 12.6    cm in length.         The kidneys appear echogenic.  There is no hydronephrosis.  No renal mass is    demonstrated.  No renal calculi are seen.              Bladder:         Unremarkable appearance of the bladder.  The patient did not void during the    exam.          Assessment:  1. ANGELY possibly secondary to ischemic ATN stemming from prerenal insult and possible NSAID use on top. Other etiologies not ruled out. From imaging studies obstruction seems less likely. 2.  Hyponatremia. By history he likely has hypovolemic hyponatremia compounded by the fact that he has had impaired free water excretion because of his ANGELY. 3.  History of bipolar disorder  4. No prior history of hyponatremia or renal disease per history       Plan:  1. Will Check Renal Ultrasound to r/o element of obstruction and to assess the kidney size/echotexture. 2. Urine testing including Urinalysis, Urine sodium, urine osmolality, Urine protein and creatinine . Will check urinary eosinophils as well. 3. Will Order serum and urine protein electrophoresis, light chain ratio  to r/o occult paraprotein disease. 4. Will order Hepatitis B and C, YULIA, ANCA, Complement levels. 5.  Normal saline 100 mL an hour. Electrolytes to be checked every 2-4 hours for now.   6.  Check a TSH level.  Check an HIV test.  7.  Discussed with primary team.  Thank you for the consultation. Please do not hesitate to call with questions.     Electronically signed by Lu Shetty MD on 8/24/2020 at 2:10 PM

## 2020-08-25 ENCOUNTER — APPOINTMENT (OUTPATIENT)
Dept: INTERVENTIONAL RADIOLOGY/VASCULAR | Age: 45
DRG: 469 | End: 2020-08-25
Payer: MEDICAID

## 2020-08-25 ENCOUNTER — APPOINTMENT (OUTPATIENT)
Dept: GENERAL RADIOLOGY | Age: 45
DRG: 469 | End: 2020-08-25
Payer: MEDICAID

## 2020-08-25 LAB
-: NORMAL
ABSOLUTE EOS #: 0.46 K/UL (ref 0–0.44)
ABSOLUTE IMMATURE GRANULOCYTE: 0.04 K/UL (ref 0–0.3)
ABSOLUTE LYMPH #: 1.66 K/UL (ref 1.1–3.7)
ABSOLUTE MONO #: 0.95 K/UL (ref 0.1–1.2)
ABSOLUTE RETIC #: 0.04 M/UL (ref 0.03–0.08)
ALBUMIN (CALCULATED): 3.4 G/DL (ref 3.2–5.2)
ALBUMIN PERCENT: 64 % (ref 45–65)
ALBUMIN SERPL-MCNC: 3.3 G/DL (ref 3.5–5.2)
ALBUMIN SERPL-MCNC: 3.6 G/DL (ref 3.5–5.2)
ALBUMIN SERPL-MCNC: 3.6 G/DL (ref 3.5–5.2)
ALBUMIN SERPL-MCNC: 3.7 G/DL (ref 3.5–5.2)
ALBUMIN/GLOBULIN RATIO: ABNORMAL (ref 1–2.5)
ALP BLD-CCNC: 57 U/L (ref 40–129)
ALP BLD-CCNC: 67 U/L (ref 40–129)
ALP BLD-CCNC: 68 U/L (ref 40–129)
ALP BLD-CCNC: 69 U/L (ref 40–129)
ALPHA 1 PERCENT: 3 % (ref 3–6)
ALPHA 2 PERCENT: 9 % (ref 6–13)
ALPHA-1-GLOBULIN: 0.2 G/DL (ref 0.1–0.4)
ALPHA-2-GLOBULIN: 0.5 G/DL (ref 0.5–0.9)
ALT SERPL-CCNC: 566 U/L (ref 5–41)
ALT SERPL-CCNC: 612 U/L (ref 5–41)
ALT SERPL-CCNC: 656 U/L (ref 5–41)
ALT SERPL-CCNC: 658 U/L (ref 5–41)
AMORPHOUS: NORMAL
ANCA MYELOPEROXIDASE: 5 AU/ML
ANCA PROTEINASE 3: 9 AU/ML
ANION GAP SERPL CALCULATED.3IONS-SCNC: 17 MMOL/L (ref 9–17)
ANION GAP SERPL CALCULATED.3IONS-SCNC: 18 MMOL/L (ref 9–17)
ANION GAP SERPL CALCULATED.3IONS-SCNC: 19 MMOL/L (ref 9–17)
ANION GAP SERPL CALCULATED.3IONS-SCNC: 20 MMOL/L (ref 9–17)
ANION GAP SERPL CALCULATED.3IONS-SCNC: 20 MMOL/L (ref 9–17)
ANION GAP SERPL CALCULATED.3IONS-SCNC: 21 MMOL/L (ref 9–17)
ANION GAP SERPL CALCULATED.3IONS-SCNC: 22 MMOL/L (ref 9–17)
ANTI DNA DOUBLE STRANDED: 4 IU/ML
ANTI-NUCLEAR ANTIBODY (ANA): NEGATIVE
AST SERPL-CCNC: 43 U/L
AST SERPL-CCNC: 45 U/L
AST SERPL-CCNC: 48 U/L
AST SERPL-CCNC: 49 U/L
BACTERIA: NORMAL
BASOPHILS # BLD: 0 % (ref 0–2)
BASOPHILS ABSOLUTE: 0.03 K/UL (ref 0–0.2)
BETA GLOBULIN: 0.6 G/DL (ref 0.5–1.1)
BETA PERCENT: 11 % (ref 11–19)
BILIRUB SERPL-MCNC: 0.65 MG/DL (ref 0.3–1.2)
BILIRUB SERPL-MCNC: 0.71 MG/DL (ref 0.3–1.2)
BILIRUB SERPL-MCNC: 0.71 MG/DL (ref 0.3–1.2)
BILIRUB SERPL-MCNC: 0.88 MG/DL (ref 0.3–1.2)
BILIRUBIN URINE: NEGATIVE
BUN BLDV-MCNC: 60 MG/DL (ref 6–20)
BUN BLDV-MCNC: 63 MG/DL (ref 6–20)
BUN BLDV-MCNC: 92 MG/DL (ref 6–20)
BUN BLDV-MCNC: 94 MG/DL (ref 6–20)
BUN/CREAT BLD: 6 (ref 9–20)
BUN/CREAT BLD: 6 (ref 9–20)
BUN/CREAT BLD: 7 (ref 9–20)
BUN/CREAT BLD: 7 (ref 9–20)
CALCIUM SERPL-MCNC: 7.7 MG/DL (ref 8.6–10.4)
CALCIUM SERPL-MCNC: 8.1 MG/DL (ref 8.6–10.4)
CALCIUM SERPL-MCNC: 8.3 MG/DL (ref 8.6–10.4)
CALCIUM SERPL-MCNC: 8.6 MG/DL (ref 8.6–10.4)
CASTS UA: NORMAL /LPF
CHLORIDE BLD-SCNC: 82 MMOL/L (ref 98–107)
CHLORIDE BLD-SCNC: 83 MMOL/L (ref 98–107)
CHLORIDE BLD-SCNC: 84 MMOL/L (ref 98–107)
CO2: 17 MMOL/L (ref 20–31)
CO2: 18 MMOL/L (ref 20–31)
CO2: 18 MMOL/L (ref 20–31)
CO2: 19 MMOL/L (ref 20–31)
CO2: 19 MMOL/L (ref 20–31)
CO2: 22 MMOL/L (ref 20–31)
CO2: 24 MMOL/L (ref 20–31)
COLOR: YELLOW
COMMENT UA: ABNORMAL
CREAT SERPL-MCNC: 10.3 MG/DL (ref 0.7–1.2)
CREAT SERPL-MCNC: 13.54 MG/DL (ref 0.7–1.2)
CREAT SERPL-MCNC: 13.7 MG/DL (ref 0.7–1.2)
CREAT SERPL-MCNC: 9.69 MG/DL (ref 0.7–1.2)
CRYSTALS, UA: NORMAL /HPF
DIFFERENTIAL TYPE: ABNORMAL
EOSINOPHIL,URINE: NORMAL
EOSINOPHILS RELATIVE PERCENT: 6 % (ref 1–4)
EPITHELIAL CELLS UA: NORMAL /HPF (ref 0–5)
GAMMA GLOBULIN %: 12 % (ref 9–20)
GAMMA GLOBULIN: 0.7 G/DL (ref 0.5–1.5)
GFR AFRICAN AMERICAN: 5 ML/MIN
GFR AFRICAN AMERICAN: 5 ML/MIN
GFR AFRICAN AMERICAN: 7 ML/MIN
GFR AFRICAN AMERICAN: 7 ML/MIN
GFR NON-AFRICAN AMERICAN: 4 ML/MIN
GFR NON-AFRICAN AMERICAN: 4 ML/MIN
GFR NON-AFRICAN AMERICAN: 5 ML/MIN
GFR NON-AFRICAN AMERICAN: 6 ML/MIN
GFR SERPL CREATININE-BSD FRML MDRD: ABNORMAL ML/MIN/{1.73_M2}
GLUCOSE BLD-MCNC: 100 MG/DL (ref 70–99)
GLUCOSE BLD-MCNC: 139 MG/DL (ref 70–99)
GLUCOSE BLD-MCNC: 76 MG/DL (ref 70–99)
GLUCOSE BLD-MCNC: 99 MG/DL (ref 70–99)
GLUCOSE URINE: NEGATIVE
HAPTOGLOBIN: 73 MG/DL (ref 30–200)
HCT VFR BLD CALC: 33.8 % (ref 40.7–50.3)
HEMOGLOBIN: 11.8 G/DL (ref 13–17)
IMMATURE GRANULOCYTES: 1 %
IMMATURE RETIC FRACT: 8.1 % (ref 2.7–18.3)
INR BLD: 1.1
KETONES, URINE: NEGATIVE
LACTATE DEHYDROGENASE: 261 U/L (ref 135–225)
LEUKOCYTE ESTERASE, URINE: NEGATIVE
LYMPHOCYTES # BLD: 20 % (ref 24–43)
MAGNESIUM: 1.5 MG/DL (ref 1.6–2.6)
MAGNESIUM: 1.7 MG/DL (ref 1.6–2.6)
MCH RBC QN AUTO: 32 PG (ref 25.2–33.5)
MCHC RBC AUTO-ENTMCNC: 34.9 G/DL (ref 28.4–34.8)
MCV RBC AUTO: 91.6 FL (ref 82.6–102.9)
MONOCYTES # BLD: 11 % (ref 3–12)
MUCUS: NORMAL
NITRITE, URINE: NEGATIVE
NRBC AUTOMATED: 0 PER 100 WBC
OTHER OBSERVATIONS UA: NORMAL
PATHOLOGIST: ABNORMAL
PDW BLD-RTO: 11.9 % (ref 11.8–14.4)
PH UA: 7 (ref 5–8)
PHOSPHORUS: 5.9 MG/DL (ref 2.5–4.5)
PLATELET # BLD: 104 K/UL (ref 138–453)
PLATELET ESTIMATE: ABNORMAL
PMV BLD AUTO: 11.3 FL (ref 8.1–13.5)
POTASSIUM SERPL-SCNC: 3.4 MMOL/L (ref 3.7–5.3)
POTASSIUM SERPL-SCNC: 3.8 MMOL/L (ref 3.7–5.3)
POTASSIUM SERPL-SCNC: 3.8 MMOL/L (ref 3.7–5.3)
POTASSIUM SERPL-SCNC: 3.9 MMOL/L (ref 3.7–5.3)
POTASSIUM SERPL-SCNC: 4 MMOL/L (ref 3.7–5.3)
POTASSIUM SERPL-SCNC: 4 MMOL/L (ref 3.7–5.3)
POTASSIUM SERPL-SCNC: 4.2 MMOL/L (ref 3.7–5.3)
PROTEIN ELECTROPHORESIS, SERUM: ABNORMAL
PROTEIN UA: ABNORMAL
PROTHROMBIN TIME: 13.7 SEC (ref 11.5–14.2)
RBC # BLD: 3.69 M/UL (ref 4.21–5.77)
RBC # BLD: ABNORMAL 10*6/UL
RBC UA: NORMAL /HPF (ref 0–2)
RENAL EPITHELIAL, UA: NORMAL /HPF
RETIC %: 1.2 % (ref 0.5–1.9)
RETIC HEMOGLOBIN: 36.5 PG (ref 28.2–35.7)
RHEUMATOID FACTOR: <10 IU/ML
SEG NEUTROPHILS: 62 % (ref 36–65)
SEGMENTED NEUTROPHILS ABSOLUTE COUNT: 5.17 K/UL (ref 1.5–8.1)
SODIUM BLD-SCNC: 121 MMOL/L (ref 135–144)
SODIUM BLD-SCNC: 121 MMOL/L (ref 135–144)
SODIUM BLD-SCNC: 122 MMOL/L (ref 135–144)
SODIUM BLD-SCNC: 123 MMOL/L (ref 135–144)
SODIUM BLD-SCNC: 124 MMOL/L (ref 135–144)
SPECIFIC GRAVITY UA: 1.01 (ref 1–1.03)
TOTAL PROT. SUM,%: 99 % (ref 98–102)
TOTAL PROT. SUM: 5.4 G/DL (ref 6.3–8.2)
TOTAL PROTEIN: 5.3 G/DL (ref 6.4–8.3)
TOTAL PROTEIN: 5.3 G/DL (ref 6.4–8.3)
TOTAL PROTEIN: 6 G/DL (ref 6.4–8.3)
TOTAL PROTEIN: 6.1 G/DL (ref 6.4–8.3)
TOTAL PROTEIN: 6.1 G/DL (ref 6.4–8.3)
TRICHOMONAS: NORMAL
TURBIDITY: CLEAR
URINE HGB: ABNORMAL
UROBILINOGEN, URINE: NORMAL
WBC # BLD: 7.9 K/UL (ref 3.5–11.3)
WBC # BLD: ABNORMAL 10*3/UL
WBC UA: NORMAL /HPF (ref 0–5)
YEAST: NORMAL

## 2020-08-25 PROCEDURE — 81001 URINALYSIS AUTO W/SCOPE: CPT

## 2020-08-25 PROCEDURE — 83615 LACTATE (LD) (LDH) ENZYME: CPT

## 2020-08-25 PROCEDURE — 84100 ASSAY OF PHOSPHORUS: CPT

## 2020-08-25 PROCEDURE — 77001 FLUOROGUIDE FOR VEIN DEVICE: CPT

## 2020-08-25 PROCEDURE — 87205 SMEAR GRAM STAIN: CPT

## 2020-08-25 PROCEDURE — 6360000002 HC RX W HCPCS: Performed by: NURSE PRACTITIONER

## 2020-08-25 PROCEDURE — 86431 RHEUMATOID FACTOR QUANT: CPT

## 2020-08-25 PROCEDURE — 6370000000 HC RX 637 (ALT 250 FOR IP): Performed by: NURSE PRACTITIONER

## 2020-08-25 PROCEDURE — C1769 GUIDE WIRE: HCPCS

## 2020-08-25 PROCEDURE — 86235 NUCLEAR ANTIGEN ANTIBODY: CPT

## 2020-08-25 PROCEDURE — 6360000002 HC RX W HCPCS: Performed by: RADIOLOGY

## 2020-08-25 PROCEDURE — 85045 AUTOMATED RETICULOCYTE COUNT: CPT

## 2020-08-25 PROCEDURE — 2580000003 HC RX 258: Performed by: NURSE PRACTITIONER

## 2020-08-25 PROCEDURE — APPSS45 APP SPLIT SHARED TIME 31-45 MINUTES: Performed by: NURSE PRACTITIONER

## 2020-08-25 PROCEDURE — 83010 ASSAY OF HAPTOGLOBIN QUANT: CPT

## 2020-08-25 PROCEDURE — 6360000002 HC RX W HCPCS: Performed by: INTERNAL MEDICINE

## 2020-08-25 PROCEDURE — 80051 ELECTROLYTE PANEL: CPT

## 2020-08-25 PROCEDURE — 71045 X-RAY EXAM CHEST 1 VIEW: CPT

## 2020-08-25 PROCEDURE — 36556 INSERT NON-TUNNEL CV CATH: CPT

## 2020-08-25 PROCEDURE — 90935 HEMODIALYSIS ONE EVALUATION: CPT

## 2020-08-25 PROCEDURE — 36415 COLL VENOUS BLD VENIPUNCTURE: CPT

## 2020-08-25 PROCEDURE — 6360000002 HC RX W HCPCS: Performed by: PEDIATRICS

## 2020-08-25 PROCEDURE — 2580000003 HC RX 258: Performed by: INTERNAL MEDICINE

## 2020-08-25 PROCEDURE — 02HV33Z INSERTION OF INFUSION DEVICE INTO SUPERIOR VENA CAVA, PERCUTANEOUS APPROACH: ICD-10-PCS | Performed by: RADIOLOGY

## 2020-08-25 PROCEDURE — 99233 SBSQ HOSP IP/OBS HIGH 50: CPT | Performed by: INTERNAL MEDICINE

## 2020-08-25 PROCEDURE — 51702 INSERT TEMP BLADDER CATH: CPT

## 2020-08-25 PROCEDURE — 83519 RIA NONANTIBODY: CPT

## 2020-08-25 PROCEDURE — 83516 IMMUNOASSAY NONANTIBODY: CPT

## 2020-08-25 PROCEDURE — 83735 ASSAY OF MAGNESIUM: CPT

## 2020-08-25 PROCEDURE — 85027 COMPLETE CBC AUTOMATED: CPT

## 2020-08-25 PROCEDURE — 2000000000 HC ICU R&B

## 2020-08-25 PROCEDURE — 80053 COMPREHEN METABOLIC PANEL: CPT

## 2020-08-25 PROCEDURE — 85610 PROTHROMBIN TIME: CPT

## 2020-08-25 RX ORDER — HEPARIN SODIUM 1000 [USP'U]/ML
1000 INJECTION, SOLUTION INTRAVENOUS; SUBCUTANEOUS PRN
Status: DISCONTINUED | OUTPATIENT
Start: 2020-08-25 | End: 2020-09-01 | Stop reason: HOSPADM

## 2020-08-25 RX ORDER — MAGNESIUM SULFATE IN WATER 40 MG/ML
2 INJECTION, SOLUTION INTRAVENOUS ONCE
Status: COMPLETED | OUTPATIENT
Start: 2020-08-25 | End: 2020-08-25

## 2020-08-25 RX ORDER — HEPARIN SODIUM 1000 [USP'U]/ML
INJECTION, SOLUTION INTRAVENOUS; SUBCUTANEOUS
Status: COMPLETED | OUTPATIENT
Start: 2020-08-25 | End: 2020-08-25

## 2020-08-25 RX ORDER — MORPHINE SULFATE 4 MG/ML
4 INJECTION, SOLUTION INTRAMUSCULAR; INTRAVENOUS ONCE
Status: COMPLETED | OUTPATIENT
Start: 2020-08-25 | End: 2020-08-25

## 2020-08-25 RX ORDER — HEPARIN SODIUM 1000 [USP'U]/ML
1200 INJECTION, SOLUTION INTRAVENOUS; SUBCUTANEOUS PRN
Status: DISCONTINUED | OUTPATIENT
Start: 2020-08-25 | End: 2020-09-01 | Stop reason: HOSPADM

## 2020-08-25 RX ADMIN — HEPARIN SODIUM 1000 UNITS: 1000 INJECTION INTRAVENOUS; SUBCUTANEOUS at 16:53

## 2020-08-25 RX ADMIN — HEPARIN SODIUM 1.2 UNITS: 1000 INJECTION, SOLUTION INTRAVENOUS; SUBCUTANEOUS at 10:07

## 2020-08-25 RX ADMIN — MORPHINE SULFATE 4 MG: 4 INJECTION, SOLUTION INTRAMUSCULAR; INTRAVENOUS at 22:34

## 2020-08-25 RX ADMIN — OLANZAPINE 10 MG: 5 TABLET, FILM COATED ORAL at 20:01

## 2020-08-25 RX ADMIN — SODIUM CHLORIDE, PRESERVATIVE FREE 10 ML: 5 INJECTION INTRAVENOUS at 21:54

## 2020-08-25 RX ADMIN — HEPARIN SODIUM 1.1 UNITS: 1000 INJECTION, SOLUTION INTRAVENOUS; SUBCUTANEOUS at 10:07

## 2020-08-25 RX ADMIN — HEPARIN SODIUM 5000 UNITS: 5000 INJECTION INTRAVENOUS; SUBCUTANEOUS at 06:04

## 2020-08-25 RX ADMIN — MAGNESIUM SULFATE HEPTAHYDRATE 2 G: 40 INJECTION, SOLUTION INTRAVENOUS at 20:01

## 2020-08-25 RX ADMIN — HEPARIN SODIUM 5000 UNITS: 5000 INJECTION INTRAVENOUS; SUBCUTANEOUS at 22:23

## 2020-08-25 RX ADMIN — HEPARIN SODIUM 5000 UNITS: 5000 INJECTION INTRAVENOUS; SUBCUTANEOUS at 15:00

## 2020-08-25 RX ADMIN — HEPARIN SODIUM 1200 UNITS: 1000 INJECTION INTRAVENOUS; SUBCUTANEOUS at 16:54

## 2020-08-25 RX ADMIN — SODIUM CHLORIDE: 9 INJECTION, SOLUTION INTRAVENOUS at 08:01

## 2020-08-25 ASSESSMENT — PAIN SCALES - GENERAL
PAINLEVEL_OUTOF10: 0
PAINLEVEL_OUTOF10: 4
PAINLEVEL_OUTOF10: 8
PAINLEVEL_OUTOF10: 0

## 2020-08-25 ASSESSMENT — PAIN DESCRIPTION - LOCATION
LOCATION: ABDOMEN
LOCATION: ABDOMEN

## 2020-08-25 ASSESSMENT — PAIN DESCRIPTION - PAIN TYPE: TYPE: ACUTE PAIN

## 2020-08-25 NOTE — PLAN OF CARE
Problem: Pain:  Goal: Pain level will decrease  Description: Pain level will decrease  Outcome: Ongoing  Goal: Control of acute pain  Description: Control of acute pain  Outcome: Ongoing  Goal: Control of chronic pain  Description: Control of chronic pain  Outcome: Ongoing         Pt has had no acute events at this time. Vital signs and assessments as charted. Safety precautions put in place. If patient conditions changes doctor will be notified.

## 2020-08-25 NOTE — PROGRESS NOTES
palpitations  Gastrointestinal:  positive for abdominal pain, negative for constipation, diarrhea, nausea, vomiting  Neurological:  negative for dizziness, headache  Positive for oliguria  Medications: Allergies: Allergies   Allergen Reactions    Codeine     Penicillins     Seasonal        Current Meds:   Scheduled Meds:    sodium chloride flush  10 mL Intravenous 2 times per day    heparin (porcine)  5,000 Units Subcutaneous 3 times per day    thiamine  100 mg Oral Daily    OLANZapine  10 mg Oral Nightly     Continuous Infusions:    sodium chloride 100 mL/hr at 20 0801     PRN Meds: sodium chloride flush, acetaminophen **OR** acetaminophen, promethazine **OR** ondansetron, nicotine, LORazepam **OR** LORazepam **OR** LORazepam **OR** LORazepam **OR** LORazepam **OR** LORazepam **OR** LORazepam **OR** LORazepam    Data:     Past Medical History:   has a past medical history of Acute renal failure (ARF) ([facility] Bipolar 1 disorder ([facility] Depression, and Osteoarthritis of both knees. Social History:   reports that he has been smoking cigarettes. He has been smoking about 1.00 pack per day. He has never used smokeless tobacco. He reports current alcohol use. He reports previous drug use. Drug: Marijuana. Family History:   Family History   Problem Relation Age of Onset    Heart Disease Father        Vitals:  /88   Pulse 55   Temp 98.4 °F (36.9 °C) (Oral)   Resp 21   Ht 6' 2\" (1.88 m)   Wt 174 lb (78.9 kg)   SpO2 93%   BMI 22.34 kg/m²   Temp (24hrs), Av.1 °F (36.7 °C), Min:97.6 °F (36.4 °C), Max:98.4 °F (36.9 °C)    No results for input(s): POCGLU in the last 72 hours. I/O (24Hr):     Intake/Output Summary (Last 24 hours) at 2020 0937  Last data filed at 2020 0512  Gross per 24 hour   Intake 3041 ml   Output 100 ml   Net 2941 ml       Labs:  Hematology:  Recent Labs     20  0930 20  0416   WBC 9.3 7.9   RBC 4.26 3.69*   HGB 13.8 11.8*   HCT 38.6* 33.8* MCV 90.6 91.6   MCH 32.4 32.0   MCHC 35.8* 34.9*   RDW 11.8 11.9   * 104*   MPV 10.5 11.3   INR  --  1.1     Chemistry:  Recent Labs     08/24/20  0930 08/24/20  1557  08/25/20  0002 08/25/20  0416 08/25/20  0757   * 121*   < > 121* 123* 122*   K 3.9 4.4   < > 4.0 3.9 3.8   CL 75* 81*   < > 82* 84* 84*   CO2 22 18*   < > 17* 18* 18*   GLUCOSE 100* 108*  --   --  100*  --    BUN 83* 87*  --   --  92*  --    CREATININE 12.26* 12.62*  --   --  13.54*  --    MG  --   --   --   --  1.7  --    ANIONGAP 22* 22*   < > 22* 21* 20*   LABGLOM 4* 4*  --   --  4*  --    GFRAA 5* 5*  --   --  5*  --    CALCIUM 8.5* 8.0*  --   --  7.7*  --    PHOS  --   --   --   --  5.9*  --    CKTOTAL 202  --   --   --   --   --     < > = values in this interval not displayed. Recent Labs     08/24/20 0930 08/24/20 1557 08/25/20  0416   PROT 6.3* 5.3*  5.7* 5.3*   LABALBU 3.9 3.5 3.3*   TSH  --  1.96  --    * 74* 49*   ALT 1,101* 876* 656*   ALKPHOS 69 61 57   BILITOT 0.79 0.75 0.65   BILIDIR 0.41*  --   --    AMYLASE 22*  --   --    LIPASE 31  --   --      ABG:  Lab Results   Component Value Date    POCPH 7.47 08/24/2020    POCPCO2 22 08/24/2020    POCPO2 78 08/24/2020    POCHCO3 15.9 08/24/2020    NBEA 8 08/24/2020    PBEA NOT REPORTED 08/24/2020    KER6IFB 17 08/24/2020    BASA0KHB 97 08/24/2020    FIO2 21.0 08/24/2020     No results found for: SPECIAL  No results found for: CULTURE    Radiology:  Ct Abdomen Pelvis Wo Contrast    Result Date: 8/25/2020  1. Limited by lack of IV contrast.  No focal inflammatory process identified in the abdomen or pelvis. 2.  Mild scattered nonspecific stranding in the upper mesenteric and omental fat. There is also a tiny amount of free fluid in the pelvis. These findings are nonspecific but could be related to enteritis. 3.  Mild diffuse urinary bladder wall thickening, likely due to incomplete distention. 4.  Multiple small lucencies noted in the bilateral iliac bones.   These 9. Telemetry monitoring   10.  Plan discussed with patient and staff      CA VIDAL NP  8/25/2020  9:37 AM

## 2020-08-25 NOTE — PROCEDURES
Osito Huynh is a 50 y.o. male patient. 1. Acute kidney injury [facility])      Past Medical History:   Diagnosis Date    Acute renal failure (ARF) ([facility] 8/24/2020    Bipolar 1 disorder (HCC)     Depression     Osteoarthritis of both knees 8/13/2017     Blood pressure (!) 143/101, pulse 81, temperature 98.4 °F (36.9 °C), temperature source Oral, resp. rate 24, height 6' 2\" (1.88 m), weight 174 lb (78.9 kg), SpO2 92 %. Procedures   Acute renal failure. Temporary hemodialysis catheter placed in VIR. Catheter in good position. EBL <2cc. No immediate complications. OK to use.     Terry Rankin MD  8/25/2020

## 2020-08-25 NOTE — PROGRESS NOTES
acidosis and metabolic alkalosis. Hemodynamically has been stable. White count has been normal.  Serologies so far showed slightly low C3 at 70, kappa lambda free light chain ratio normal.  Rest of serologies pending. HIV 1 and 2-, hepatitis serologies negative. Liver enzymes showed high ALT at 1101 and . Outpatient Medications:     Medications Prior to Admission: OLANZapine (ZYPREXA) 10 MG tablet, Take 10 mg by mouth nightly   cyclobenzaprine (FLEXERIL) 10 MG tablet, Take 10 mg by mouth daily as needed for Muscle spasms  ibuprofen (ADVIL;MOTRIN) 800 MG tablet, Take 1 tablet by mouth every 8 hours as needed for Pain or Fever  meloxicam (MOBIC) 15 MG tablet, TAKE 1 TABLET BY MOUTH DAILY  [DISCONTINUED] MAPAP 500 MG tablet, TAKE 2 TABLETS BY MOUTH EVERY 6 HOURS AS NEEDED FOR PAIN  [DISCONTINUED] V-R CALAMINE LOTN, Apply 1 Act topically 2 times daily  [DISCONTINUED] sertraline (ZOLOFT) 50 MG tablet, Take 1 tablet by mouth daily. (Patient taking differently: Take 50 mg by mouth 2 times daily )    Current Medications:     Scheduled Meds:    sodium chloride flush  10 mL Intravenous 2 times per day    heparin (porcine)  5,000 Units Subcutaneous 3 times per day    thiamine  100 mg Oral Daily    OLANZapine  10 mg Oral Nightly     Continuous Infusions:    sodium chloride 100 mL/hr at 20 0801     PRN Meds:  sodium chloride flush, acetaminophen **OR** acetaminophen, promethazine **OR** ondansetron, nicotine, LORazepam **OR** LORazepam **OR** LORazepam **OR** LORazepam **OR** LORazepam **OR** LORazepam **OR** LORazepam **OR** LORazepam    Input/Output:       I/O last 3 completed shifts: In: 0130 [P.O.:600; I.V.:2441]  Out: 100 [Urine:100].       Patient Vitals for the past 96 hrs (Last 3 readings):   Weight   20 1228 174 lb (78.9 kg)   20 0921 174 lb (78.9 kg)       Vital Signs:   Temperature:  Temp: 98.4 °F (36.9 °C)  TMax:   Temp (24hrs), Av.2 °F (36.8 °C), Min:97.6 °F (36.4 °C), Max:98.5 °F (36.9 °C)    Respirations:  Resp: 21  Pulse:   Pulse: 55  BP:    BP: 131/88  BP Range: Systolic (78TSC), CIH:450 , Min:118 , EMK:888       Diastolic (10RJT), KMN:69, Min:79, Max:106      Physical Examination:     General:  AAO x 3, speaking in full sentences, no accessory muscle use. HEENT: Atraumatic, normocephalic, no throat congestion, moist mucosa. Eyes:   Pupils equal, round and reactive to light, EOMI. Neck:   No JVD, no thyromegaly, no lymphadenopathy. Chest:  Bilateral vesicular breath sounds, no rales or wheezes. Cardiac:  S1 S2 RR, no murmurs, gallops or rubs, JVP not raised. Abdomen: Soft, non-tender, no masses or organomegaly, BS audible. :   No suprapubic or flank tenderness. Neuro:  AAO x 3, No FND. SKIN:  No rashes, good skin turgor. Extremities:  No edema, palpable peripheral pulses, no calf tenderness. Peripheral pulses are palpable and normal  No asterixis  Labs:       Recent Labs     08/24/20  0930 08/25/20  0416   WBC 9.3 7.9   RBC 4.26 3.69*   HGB 13.8 11.8*   HCT 38.6* 33.8*   MCV 90.6 91.6   MCH 32.4 32.0   MCHC 35.8* 34.9*   RDW 11.8 11.9   * 104*   MPV 10.5 11.3      BMP:   Recent Labs     08/24/20  0930 08/24/20  1557  08/25/20  0002 08/25/20  0416 08/25/20  0757   * 121*   < > 121* 123* 122*   K 3.9 4.4   < > 4.0 3.9 3.8   CL 75* 81*   < > 82* 84* 84*   CO2 22 18*   < > 17* 18* 18*   BUN 83* 87*  --   --  92*  --    CREATININE 12.26* 12.62*  --   --  13.54*  --    GLUCOSE 100* 108*  --   --  100*  --    CALCIUM 8.5* 8.0*  --   --  7.7*  --     < > = values in this interval not displayed.       Phosphorus:     Recent Labs     08/25/20  0416   PHOS 5.9*     Magnesium:    Recent Labs     08/25/20  0416   MG 1.7     Albumin:    Recent Labs     08/24/20  0930 08/24/20  1557 08/25/20  0416   LABALBU 3.9 3.5 3.3*     BNP:    No results found for: BNP  YULIA:    No results found for: YULIA  SPEP:  Lab Results   Component Value Date    PROT 5.3 08/25/2020    ALBCAL PENDING 08/24/2020    ALBPCT PENDING 08/24/2020    LABALPH PENDING 08/24/2020    LABALPH PENDING 08/24/2020    A1PCT PENDING 08/24/2020    A2PCT PENDING 08/24/2020    LABBETA PENDING 08/24/2020    BETAPCT PENDING 08/24/2020    GAMGLOB PENDING 08/24/2020    GGPCT PENDING 08/24/2020    PATH PENDING 08/24/2020     UPEP:   No results found for: LABPE  C3:     Lab Results   Component Value Date    C3 70 08/24/2020     C4:     Lab Results   Component Value Date    C4 15 08/24/2020     MPO ANCA:   No results found for: MPO  PR3 ANCA:   No results found for: PR3  Anti-GBM:   No results found for: GBMABIGG  Hep BsAg:         Lab Results   Component Value Date    HEPBSAG NONREACTIVE 08/24/2020     Hep C AB:          Lab Results   Component Value Date    HEPCAB NONREACTIVE 08/24/2020       Urinalysis/Chemistries:      Lab Results   Component Value Date    NITRU NEGATIVE 08/24/2020    COLORU YELLOW 08/24/2020    PHUR 7.0 08/24/2020    WBCUA 2 TO 5 08/24/2020    RBCUA 10 TO 20 08/24/2020    MUCUS NOT REPORTED 08/24/2020    TRICHOMONAS NOT REPORTED 08/24/2020    YEAST NOT REPORTED 08/24/2020    BACTERIA NOT REPORTED 08/24/2020    SPECGRAV 1.020 08/24/2020    LEUKOCYTESUR NEGATIVE 08/24/2020    UROBILINOGEN Normal 08/24/2020    BILIRUBINUR NEGATIVE 08/24/2020    GLUCOSEU NEGATIVE 08/24/2020    KETUA TRACE 08/24/2020    AMORPHOUS 1+ 08/24/2020     Urine Sodium:     Lab Results   Component Value Date    ANDREA 43 08/24/2020     Urine Potassium:  No results found for: KUR  Urine Chloride:  No results found for: CLUR  Urine Osmolarity:   Lab Results   Component Value Date    OSMOU 209 08/24/2020     Urine Protein:   No components found for: TOTALPROTEIN, URINE   Urine Creatinine:     Lab Results   Component Value Date    LABCREA 63.1 08/24/2020     Urine Eosinophils:  No components found for: UEOS    Radiology:     CXR:     Assessment:     1.  Acute Kidney Injury: Suspected acute tubular necrosis from intravascular volume depletion from decreased intake, nausea vomiting, NSAID use. Other possibilities include acute interstitial nephritis from NSAIDs. Urine studies do indicate RBCs and WBCs in the urine. Possibility of underlying connective tissue disease/rapidly progressing GN less likely based on patient's presentation. Presence of thrombocytopenia warrants work-up for thrombotic microangiopathy as well. Patient does have proteinuria about 7 g. Obstructive uropathy not ruled out although less likely. Creatinine was 0.9 in October 2019, now up to 12, oligo anuric and persists. Clinically showing signs of uremia including abdominal pain nausea and vomiting. 2.  Nephrotic range proteinuria hard to assess importance given acute kidney injury  3. Thrombocytopenia secondary to above versus chronic liver disease  4. Echogenic and somewhat large size kidneys warrant work-up for infiltrative diseases  5. Schizoaffective disorder  6. Substance abuse  7. Anemia  8. Hyperphosphatemia   9. Euvolemic hyponatremia secondary to beer [place]    Plan:   1. Place [place]  2. Place temporary dialysis catheter and initiate hemodialysis today for uremia, orders given, will run him on a low sodium bath  3. Check LDH, haptoglobin and peripheral smear for schistocytes  4. Decrease IV fluids normal saline at 50 mL an hour  5. Check anti-GBM antibodies, anti-[place] antibodies  6. Keep systolic pressure more than 110  7. If renal function continues to be suboptimal and there is no improvement patient may need a kidney biopsy same was discussed with him. 8.  Thank you for this interesting consult will follow with you    Nutrition   Please ensure that patient is on a renal diet/TF. Avoid nephrotoxic drugs/contrast exposure. We will continue to follow along with you.

## 2020-08-25 NOTE — FLOWSHEET NOTE
Patient sleeping; no family present. Writer prays for patient; leaves note of support on tray table. Spiritual Care will follow as needed.      08/25/20 0905   Encounter Summary   Services provided to: Patient   Referral/Consult From: Derrick   Continue Visiting   (8/25/20 sleeping)   Complexity of Encounter Low   Length of Encounter 15 minutes   Routine   Type Initial   Assessment Sleeping

## 2020-08-25 NOTE — PROGRESS NOTES
HEMODIALYSIS PRE-TREATMENT NOTE    Patient Identifiers prior to treatment: dasia    Isolation Required: no                      Isolation Type: standard       (please document if patient is being managed as a PUI/COVID-19 patient)        Hepatitis status:                           Date Drawn                             Result  Hepatitis B Surface Antigen 8/24/2020     neg                     Hepatitis B Surface Antibody 8/24/2020 neg        Hepatitis B Core Antibody 08/24/2020 neg          How was Hepatitis Status verified: yes     Was a copy of the labs you documented provided to facility for the patient's chart: yes    Hemodialysis orders verified: yes    Access Within normal limits ( I.e. s/s of infection,...): yes     Pre-Assessment completed: yes    Pre-dialysis report received from: yes                      Time: 14:20

## 2020-08-25 NOTE — PLAN OF CARE
Problem: Pain:  Intervention: Promote participation in pain management plan  Note: Comfort level assessed at frequent intervals. Medicated PRN. Encouraged the use of both pharmacological and non-pharmacological methods of pain control. Instructed to call with any c/o discomfort. Monitored the effectiveness of all interventions utilized. Problem: Tissue Perfusion - Renal, Altered:  Intervention: Fluid volume management  Note: Monitored lab work as ordered. Report abnormalities to appropriate physicians. IV fluids as ordered. Strict monitoring of I&O. VS monitored.

## 2020-08-25 NOTE — PLAN OF CARE
Problem: Falls - Risk of: Intervention: Appropriate assistance to ensure safe transfer  Note: Continue fall precautions including arm band identification, falling star placed outside of the room and alarm at night and when unattended. Use of ambulatory aids when indicated and frequent visual checks. Monitored orientation status. Call light within reach at all times. Bed remains in lowest locked position. Frequent nursing rounds. Fall precautions remain in place. Instructed to call with any needs for assistance prior to getting OOB. All personal belongings within reach. Patient is in view of nurse's station.

## 2020-08-25 NOTE — PROGRESS NOTES
HEMODIALYSIS POST TREATMENT NOTE    Treatment time ordered: 2.5    Actual treatment time: 2.5    UltraFiltration Goal: 300  UltraFiltration Removed: 300  Pre Treatment weight: 78.4  Post Treatment weight: 78.1  Estimated Dry Weight: NA    Access used:     Central Venous Catheter: Y     Internal Access: NA       Access Flow: GOOD     Sign and symptoms of infection: NA       If YES: NA    Medications or blood products given: NA    Regular outpatient schedule: ACUTE    Summary of response to treatment: GOOD    Explain if orders NOT met, was physician notified:KATHY      ACES flowsheet faxed to patient unit/ placed in patient chart: Y    Post assessment completed: Y    Report given to: Y      * Intra-treatment documented Safety Checks include the followin) Access and face visible at all times. 2) All connections and blood lines are secure with no kinks. 3) NVL alarm engaged. 4) Hemosafe device applied (if applicable). 5) No collapse of Arterial or Venous blood chambers. 6) All blood lines / pump segments in the air detectors.

## 2020-08-25 NOTE — PROGRESS NOTES
Dialysis center notified that patient is a new start. Informed that dialysis  catheter is in place and  would like patient run today Writer was told that patient will receive dialysis this afternoon.  ( [phone])

## 2020-08-26 ENCOUNTER — APPOINTMENT (OUTPATIENT)
Dept: GENERAL RADIOLOGY | Age: 45
DRG: 469 | End: 2020-08-26
Payer: MEDICAID

## 2020-08-26 LAB
-: NORMAL
ALBUMIN SERPL-MCNC: 3.2 G/DL (ref 3.5–5.2)
ALBUMIN SERPL-MCNC: 3.3 G/DL (ref 3.5–5.2)
ALBUMIN SERPL-MCNC: 3.3 G/DL (ref 3.5–5.2)
ALBUMIN SERPL-MCNC: 3.4 G/DL (ref 3.5–5.2)
ALBUMIN/GLOBULIN RATIO: ABNORMAL (ref 1–2.5)
ALP BLD-CCNC: 56 U/L (ref 40–129)
ALP BLD-CCNC: 57 U/L (ref 40–129)
ALP BLD-CCNC: 57 U/L (ref 40–129)
ALP BLD-CCNC: 65 U/L (ref 40–129)
ALT SERPL-CCNC: 344 U/L (ref 5–41)
ALT SERPL-CCNC: 367 U/L (ref 5–41)
ALT SERPL-CCNC: 423 U/L (ref 5–41)
ALT SERPL-CCNC: 447 U/L (ref 5–41)
ANION GAP SERPL CALCULATED.3IONS-SCNC: 11 MMOL/L (ref 9–17)
ANION GAP SERPL CALCULATED.3IONS-SCNC: 14 MMOL/L (ref 9–17)
ANION GAP SERPL CALCULATED.3IONS-SCNC: 15 MMOL/L (ref 9–17)
ANION GAP SERPL CALCULATED.3IONS-SCNC: 16 MMOL/L (ref 9–17)
ANION GAP SERPL CALCULATED.3IONS-SCNC: 17 MMOL/L (ref 9–17)
ANION GAP SERPL CALCULATED.3IONS-SCNC: 17 MMOL/L (ref 9–17)
ANTI-SMITH: 10 U/ML
AST SERPL-CCNC: 24 U/L
AST SERPL-CCNC: 26 U/L
AST SERPL-CCNC: 30 U/L
AST SERPL-CCNC: 31 U/L
BILIRUB SERPL-MCNC: 0.6 MG/DL (ref 0.3–1.2)
BILIRUB SERPL-MCNC: 0.66 MG/DL (ref 0.3–1.2)
BILIRUB SERPL-MCNC: 0.81 MG/DL (ref 0.3–1.2)
BILIRUB SERPL-MCNC: 0.81 MG/DL (ref 0.3–1.2)
BUN BLDV-MCNC: 37 MG/DL (ref 6–20)
BUN BLDV-MCNC: 38 MG/DL (ref 6–20)
BUN BLDV-MCNC: 65 MG/DL (ref 6–20)
BUN BLDV-MCNC: 69 MG/DL (ref 6–20)
BUN/CREAT BLD: 5 (ref 9–20)
BUN/CREAT BLD: 5 (ref 9–20)
BUN/CREAT BLD: 6 (ref 9–20)
BUN/CREAT BLD: 6 (ref 9–20)
CALCIUM SERPL-MCNC: 8 MG/DL (ref 8.6–10.4)
CALCIUM SERPL-MCNC: 8.2 MG/DL (ref 8.6–10.4)
CALCIUM SERPL-MCNC: 8.3 MG/DL (ref 8.6–10.4)
CALCIUM SERPL-MCNC: 8.3 MG/DL (ref 8.6–10.4)
CHLORIDE BLD-SCNC: 84 MMOL/L (ref 98–107)
CHLORIDE BLD-SCNC: 85 MMOL/L (ref 98–107)
CHLORIDE BLD-SCNC: 86 MMOL/L (ref 98–107)
CHLORIDE BLD-SCNC: 87 MMOL/L (ref 98–107)
CHLORIDE BLD-SCNC: 94 MMOL/L (ref 98–107)
CHLORIDE BLD-SCNC: 95 MMOL/L (ref 98–107)
CO2: 21 MMOL/L (ref 20–31)
CO2: 22 MMOL/L (ref 20–31)
CO2: 25 MMOL/L (ref 20–31)
CO2: 27 MMOL/L (ref 20–31)
CREAT SERPL-MCNC: 11.47 MG/DL (ref 0.7–1.2)
CREAT SERPL-MCNC: 11.51 MG/DL (ref 0.7–1.2)
CREAT SERPL-MCNC: 7.27 MG/DL (ref 0.7–1.2)
CREAT SERPL-MCNC: 7.78 MG/DL (ref 0.7–1.2)
GFR AFRICAN AMERICAN: 10 ML/MIN
GFR AFRICAN AMERICAN: 6 ML/MIN
GFR AFRICAN AMERICAN: 6 ML/MIN
GFR AFRICAN AMERICAN: 9 ML/MIN
GFR NON-AFRICAN AMERICAN: 5 ML/MIN
GFR NON-AFRICAN AMERICAN: 5 ML/MIN
GFR NON-AFRICAN AMERICAN: 8 ML/MIN
GFR NON-AFRICAN AMERICAN: 8 ML/MIN
GFR SERPL CREATININE-BSD FRML MDRD: ABNORMAL ML/MIN/{1.73_M2}
GLUCOSE BLD-MCNC: 108 MG/DL (ref 70–99)
GLUCOSE BLD-MCNC: 119 MG/DL (ref 70–99)
GLUCOSE BLD-MCNC: 133 MG/DL (ref 70–99)
GLUCOSE BLD-MCNC: 151 MG/DL (ref 70–99)
HCT VFR BLD CALC: 34.2 % (ref 40.7–50.3)
HEMOGLOBIN: 12.2 G/DL (ref 13–17)
INR BLD: 1.1
MAGNESIUM: 1.9 MG/DL (ref 1.6–2.6)
MCH RBC QN AUTO: 33 PG (ref 25.2–33.5)
MCHC RBC AUTO-ENTMCNC: 35.7 G/DL (ref 28.4–34.8)
MCV RBC AUTO: 92.4 FL (ref 82.6–102.9)
NRBC AUTOMATED: 0 PER 100 WBC
PATHOLOGIST REVIEW: NORMAL
PDW BLD-RTO: 12.1 % (ref 11.8–14.4)
PLATELET # BLD: 120 K/UL (ref 138–453)
PMV BLD AUTO: 10.6 FL (ref 8.1–13.5)
POTASSIUM SERPL-SCNC: 4 MMOL/L (ref 3.7–5.3)
POTASSIUM SERPL-SCNC: 4.2 MMOL/L (ref 3.7–5.3)
POTASSIUM SERPL-SCNC: 4.3 MMOL/L (ref 3.7–5.3)
POTASSIUM SERPL-SCNC: 4.4 MMOL/L (ref 3.7–5.3)
PROTHROMBIN TIME: 14.4 SEC (ref 11.5–14.2)
RBC # BLD: 3.7 M/UL (ref 4.21–5.77)
REASON FOR REJECTION: NORMAL
SODIUM BLD-SCNC: 124 MMOL/L (ref 135–144)
SODIUM BLD-SCNC: 125 MMOL/L (ref 135–144)
SODIUM BLD-SCNC: 131 MMOL/L (ref 135–144)
SODIUM BLD-SCNC: 132 MMOL/L (ref 135–144)
SURGICAL PATHOLOGY REPORT: NORMAL
TOTAL PROTEIN: 5.4 G/DL (ref 6.4–8.3)
TOTAL PROTEIN: 5.6 G/DL (ref 6.4–8.3)
TOTAL PROTEIN: 5.7 G/DL (ref 6.4–8.3)
TOTAL PROTEIN: 5.7 G/DL (ref 6.4–8.3)
WBC # BLD: 11 K/UL (ref 3.5–11.3)
ZZ NTE CLEAN UP: ORDERED TEST: NORMAL
ZZ NTE WITH NAME CLEAN UP: SPECIMEN SOURCE: NORMAL

## 2020-08-26 PROCEDURE — 5A1D70Z PERFORMANCE OF URINARY FILTRATION, INTERMITTENT, LESS THAN 6 HOURS PER DAY: ICD-10-PCS | Performed by: INTERNAL MEDICINE

## 2020-08-26 PROCEDURE — 6370000000 HC RX 637 (ALT 250 FOR IP): Performed by: NURSE PRACTITIONER

## 2020-08-26 PROCEDURE — 6360000002 HC RX W HCPCS: Performed by: NURSE PRACTITIONER

## 2020-08-26 PROCEDURE — 2060000000 HC ICU INTERMEDIATE R&B

## 2020-08-26 PROCEDURE — APPSS45 APP SPLIT SHARED TIME 31-45 MINUTES: Performed by: NURSE PRACTITIONER

## 2020-08-26 PROCEDURE — 80053 COMPREHEN METABOLIC PANEL: CPT

## 2020-08-26 PROCEDURE — 94760 N-INVAS EAR/PLS OXIMETRY 1: CPT

## 2020-08-26 PROCEDURE — 85610 PROTHROMBIN TIME: CPT

## 2020-08-26 PROCEDURE — 94660 CPAP INITIATION&MGMT: CPT

## 2020-08-26 PROCEDURE — 2500000003 HC RX 250 WO HCPCS: Performed by: INTERNAL MEDICINE

## 2020-08-26 PROCEDURE — 71045 X-RAY EXAM CHEST 1 VIEW: CPT

## 2020-08-26 PROCEDURE — 99232 SBSQ HOSP IP/OBS MODERATE 35: CPT | Performed by: INTERNAL MEDICINE

## 2020-08-26 PROCEDURE — 83735 ASSAY OF MAGNESIUM: CPT

## 2020-08-26 PROCEDURE — 90935 HEMODIALYSIS ONE EVALUATION: CPT

## 2020-08-26 PROCEDURE — 85027 COMPLETE CBC AUTOMATED: CPT

## 2020-08-26 PROCEDURE — 80051 ELECTROLYTE PANEL: CPT

## 2020-08-26 PROCEDURE — 2580000003 HC RX 258: Performed by: NURSE PRACTITIONER

## 2020-08-26 PROCEDURE — 36415 COLL VENOUS BLD VENIPUNCTURE: CPT

## 2020-08-26 PROCEDURE — 2700000000 HC OXYGEN THERAPY PER DAY

## 2020-08-26 PROCEDURE — 6370000000 HC RX 637 (ALT 250 FOR IP): Performed by: INTERNAL MEDICINE

## 2020-08-26 PROCEDURE — 94761 N-INVAS EAR/PLS OXIMETRY MLT: CPT

## 2020-08-26 RX ORDER — FUROSEMIDE 10 MG/ML
40 INJECTION INTRAMUSCULAR; INTRAVENOUS ONCE
Status: COMPLETED | OUTPATIENT
Start: 2020-08-26 | End: 2020-08-26

## 2020-08-26 RX ORDER — CARVEDILOL 6.25 MG/1
6.25 TABLET ORAL 2 TIMES DAILY WITH MEALS
Status: DISCONTINUED | OUTPATIENT
Start: 2020-08-26 | End: 2020-09-01 | Stop reason: HOSPADM

## 2020-08-26 RX ORDER — SODIUM CITRATE 4 % (5 ML)
1 SYRINGE (ML) MISCELLANEOUS PRN
Status: DISCONTINUED | OUTPATIENT
Start: 2020-08-26 | End: 2020-09-01 | Stop reason: HOSPADM

## 2020-08-26 RX ORDER — SODIUM CITRATE 4 % (5 ML)
1.2 SYRINGE (ML) MISCELLANEOUS PRN
Status: DISCONTINUED | OUTPATIENT
Start: 2020-08-26 | End: 2020-09-01 | Stop reason: HOSPADM

## 2020-08-26 RX ADMIN — HEPARIN SODIUM 5000 UNITS: 5000 INJECTION INTRAVENOUS; SUBCUTANEOUS at 20:56

## 2020-08-26 RX ADMIN — CARVEDILOL 6.25 MG: 6.25 TABLET, FILM COATED ORAL at 12:38

## 2020-08-26 RX ADMIN — LORAZEPAM 2 MG: 2 INJECTION, SOLUTION INTRAMUSCULAR; INTRAVENOUS at 17:44

## 2020-08-26 RX ADMIN — ACETAMINOPHEN 650 MG: 325 TABLET ORAL at 12:38

## 2020-08-26 RX ADMIN — HEPARIN SODIUM 5000 UNITS: 5000 INJECTION INTRAVENOUS; SUBCUTANEOUS at 06:24

## 2020-08-26 RX ADMIN — SODIUM CHLORIDE, PRESERVATIVE FREE 10 ML: 5 INJECTION INTRAVENOUS at 23:32

## 2020-08-26 RX ADMIN — OLANZAPINE 10 MG: 5 TABLET, FILM COATED ORAL at 20:56

## 2020-08-26 RX ADMIN — Medication 100 MG: at 09:40

## 2020-08-26 RX ADMIN — HEPARIN SODIUM 5000 UNITS: 5000 INJECTION INTRAVENOUS; SUBCUTANEOUS at 14:36

## 2020-08-26 RX ADMIN — CARVEDILOL 6.25 MG: 6.25 TABLET, FILM COATED ORAL at 18:28

## 2020-08-26 RX ADMIN — FUROSEMIDE 40 MG: 10 INJECTION, SOLUTION INTRAMUSCULAR; INTRAVENOUS at 20:56

## 2020-08-26 RX ADMIN — Medication 1 ML: at 17:31

## 2020-08-26 RX ADMIN — Medication 1.2 ML: at 17:30

## 2020-08-26 ASSESSMENT — PAIN SCALES - GENERAL
PAINLEVEL_OUTOF10: 5
PAINLEVEL_OUTOF10: 0
PAINLEVEL_OUTOF10: 8
PAINLEVEL_OUTOF10: 0
PAINLEVEL_OUTOF10: 8
PAINLEVEL_OUTOF10: 7
PAINLEVEL_OUTOF10: 0
PAINLEVEL_OUTOF10: 8
PAINLEVEL_OUTOF10: 0

## 2020-08-26 ASSESSMENT — PAIN DESCRIPTION - PAIN TYPE
TYPE: CHRONIC PAIN
TYPE: ACUTE PAIN

## 2020-08-26 ASSESSMENT — PAIN DESCRIPTION - LOCATION
LOCATION: ABDOMEN
LOCATION: ABDOMEN

## 2020-08-26 ASSESSMENT — PAIN DESCRIPTION - DESCRIPTORS
DESCRIPTORS: ACHING
DESCRIPTORS: PATIENT UNABLE TO DESCRIBE

## 2020-08-26 ASSESSMENT — PAIN DESCRIPTION - FREQUENCY
FREQUENCY: CONTINUOUS
FREQUENCY: INTERMITTENT

## 2020-08-26 ASSESSMENT — PAIN DESCRIPTION - ORIENTATION
ORIENTATION: ANTERIOR
ORIENTATION: ANTERIOR

## 2020-08-26 NOTE — PROGRESS NOTES
Nephrology Progress Note      SUBJECTIVE       Pt was seen and examined. No acute issues overnite. Stable hemodynamics . Encouraging improvement in urine output over the last 14 to 15 hours. He put out about 500 cc through the night and as of this morning he is had a urine output of around 80 mL/h x 4. He is awake and alert. Feels a little better. Blood pressures are marginally on the high side. No fever, no chills. Still has mild epigastric pain. Patient did receive hemodialysis yesterday because of azotemia. Scheduled to have another treatment today. OBJECTIVE      CURRENT TEMPERATURE:  Temp: 98.1 °F (36.7 °C)  MAXIMUM TEMPERATURE OVER 24HRS:  Temp (24hrs), Av °F (36.7 °C), Min:97.5 °F (36.4 °C), Max:98.6 °F (37 °C)    CURRENT RESPIRATORY RATE:  Resp: 20  CURRENT PULSE:  Pulse: 90  CURRENT BLOOD PRESSURE:  BP: (!) 151/81  24HR BLOOD PRESSURE RANGE:  Systolic (42JZU), YDE:860 , Min:129 , KHN:989   ; Diastolic (61ARV), WZJ:97, Min:72, Max:114    24HR INTAKE/OUTPUT:      Intake/Output Summary (Last 24 hours) at 2020 1032  Last data filed at 2020 0511  Gross per 24 hour   Intake 2107 ml   Output 875 ml   Net 1232 ml     WEIGHT :  Patient Vitals for the past 96 hrs (Last 3 readings):   Weight   20 1659 172 lb 13.5 oz (78.4 kg)   20 1423 173 lb 15.1 oz (78.9 kg)   20 1228 174 lb (78.9 kg)     PHYSICAL EXAM      GENERAL APPEARANCE:Awake, alert, in no acute distress  SKIN: warm and dry, no rash or erythema  EYES: conjunctivae normal and sclera anicteric  ENT: no thrush no pharyngeal congestion   NECK:   No JVD. No carotid bruits and no carotid lymphadenopathy . PULMONARY: lungs are clear to auscultation. No Wheezing, no ronchi . CADRDIOVASCULAR: S1 and S2 normal NO S3 and NO S4 . No rubs , no murmur. ABDOMEN: soft nontender, bowel sounds present, no organomegaly, no ascites.        EXTREMITIES: no cyanosis, clubbing or edema     CURRENT MEDICATIONS      heparin (porcine) injection 1,000 Units, PRN  heparin (porcine) injection 1,200 Units, PRN  0.9 % sodium chloride infusion, Continuous  sodium chloride flush 0.9 % injection 10 mL, 2 times per day  sodium chloride flush 0.9 % injection 10 mL, PRN  acetaminophen (TYLENOL) tablet 650 mg, Q6H PRN    Or  acetaminophen (TYLENOL) suppository 650 mg, Q6H PRN  promethazine (PHENERGAN) tablet 12.5 mg, Q6H PRN    Or  ondansetron (ZOFRAN) injection 4 mg, Q6H PRN  nicotine (NICODERM CQ) 21 MG/24HR 1 patch, Daily PRN  heparin (porcine) injection 5,000 Units, 3 times per day  LORazepam (ATIVAN) tablet 1 mg, Q1H PRN    Or  LORazepam (ATIVAN) injection 1 mg, Q1H PRN    Or  LORazepam (ATIVAN) tablet 2 mg, Q1H PRN    Or  LORazepam (ATIVAN) injection 2 mg, Q1H PRN    Or  LORazepam (ATIVAN) tablet 3 mg, Q1H PRN    Or  LORazepam (ATIVAN) injection 3 mg, Q1H PRN    Or  LORazepam (ATIVAN) tablet 4 mg, Q1H PRN    Or  LORazepam (ATIVAN) injection 4 mg, Q1H PRN  vitamin B-1 (THIAMINE) tablet 100 mg, Daily  OLANZapine (ZYPREXA) tablet 10 mg, Nightly          LABS      CBC:   Recent Labs     08/24/20  0930 08/25/20  0416 08/26/20  0549   WBC 9.3 7.9 11.0   RBC 4.26 3.69* 3.70*   HGB 13.8 11.8* 12.2*   HCT 38.6* 33.8* 34.2*   MCV 90.6 91.6 92.4   MCH 32.4 32.0 33.0   MCHC 35.8* 34.9* 35.7*   RDW 11.8 11.9 12.1   * 104* 120*   MPV 10.5 11.3 10.6      BMP:   Recent Labs     08/25/20  1807 08/25/20  2309 08/26/20  0549 08/26/20  0806   * 124* 125* 124*   K 3.4* 3.8 4.2 4.2   CL 83* 83* 86* 85*   CO2 22 24 22 22   BUN 60* 63* 65*  --    CREATININE 9.69* 10.30* 11.51*  --    GLUCOSE 139* 99 108*  --    CALCIUM 8.6 8.3* 8.0*  --       PHOSPHORUS:    Recent Labs     08/25/20  0416   PHOS 5.9*     MAGNESIUM:   Recent Labs     08/25/20  0416 08/25/20  1807 08/26/20  0549   MG 1.7 1.5* 1.9     ALBUMIN:   Recent Labs     08/25/20 1807 08/25/20  2309 08/26/20  0549   LABALBU 3.6 3.7 3.3*     YULIA:   Lab Results   Component Value Date    YULIA NEGATIVE 08/24/2020 SPEP:   Lab Results   Component Value Date    PROT 5.4 08/26/2020    ALBCAL 3.4 08/24/2020    ALBPCT 64 08/24/2020    LABALPH 0.2 08/24/2020    LABALPH 0.5 08/24/2020    A1PCT 3 08/24/2020    A2PCT 9 08/24/2020    LABBETA 0.6 08/24/2020    BETAPCT 11 08/24/2020    GAMGLOB 0.7 08/24/2020    GGPCT 12 08/24/2020    PATH ELECTRONICALLY SIGNED. Anny Heller M.D. 08/24/2020       HEPBSAG:  Lab Results   Component Value Date    HEPBSAG NONREACTIVE 08/24/2020     HEPCAB:  Lab Results   Component Value Date    HEPCAB NONREACTIVE 08/24/2020     C3:   Lab Results   Component Value Date    C3 70 (L) 08/24/2020     C4:   Lab Results   Component Value Date    C4 15 08/24/2020     MPO ANCA:   Lab Results   Component Value Date    MPO 5 08/24/2020    . PR3 ANCA:    Lab Results   Component Value Date    PR3 9 08/24/2020     URINE SODIUM:    Lab Results   Component Value Date    ANDREA 43 08/24/2020      URINE CREATININE:    Lab Results   Component Value Date    LABCREA 63.1 08/24/2020     URINE EOSINOPHILS:   Lab Results   Component Value Date    UREO NONE SEEN 08/25/2020     URINALYSIS:  U/A:   Lab Results   Component Value Date    NITRU NEGATIVE 08/25/2020    COLORU YELLOW 08/25/2020    PHUR 7.0 08/25/2020    WBCUA 5 TO 10 08/25/2020    RBCUA 10 TO 20 08/25/2020    MUCUS NOT REPORTED 08/25/2020    TRICHOMONAS NOT REPORTED 08/25/2020    YEAST NOT REPORTED 08/25/2020    BACTERIA NOT REPORTED 08/25/2020    SPECGRAV 1.010 08/25/2020    LEUKOCYTESUR NEGATIVE 08/25/2020    UROBILINOGEN Normal 08/25/2020    BILIRUBINUR NEGATIVE 08/25/2020    GLUCOSEU NEGATIVE 08/25/2020    KETUA NEGATIVE 08/25/2020    AMORPHOUS NOT REPORTED 08/25/2020     Urine tox screen was positive for cocaine      ASSESSMENT      1. ANGELY : Most likely secondary to acute tubular necrosis from intravascular volume depletion, decreased oral intake and severe vasoconstriction from cocaine use. Creatinine had gone up to 13, urine output is gradually picking up. He did have mild, few RBCs in the urine  2. HTN: Blood pressure is marginally high, will consider starting low-dose Coreg  3 clinically do not suspect TTP  4. Hyponatremia is gradually improving. PLAN      1. Hemodialysis today for 3 hours. Will not remove any fluid. Hemodialysis bath was reviewed with nurse. Peripheral smear seems to be negative for schistocytes. LDH is not very impressive, doubt significant microangiopathy. Anti-GBM titers pending but do not suspect they will be positive with paucity of pulmonary symptoms   2. Encourage oral intake  3. Follow up labs ordered. 4.  Will repeat urine protein creatinine ratio. Holding off on biopsy at least for now. 5.  From renal standpoint, his [place] can be removed  6. Okay to move to stepdown from renal standpoint      Please do not hesitate to call with questions.     Electronically signed by Nancy Bowers MD on 8/26/2020 at 10:32 AM

## 2020-08-26 NOTE — PROGRESS NOTES
During handoff nurse spoke with writer regarding new results on recent CXR, which showed pulm edema. On call NP notified, orders to stop IV fluids, notify Nephrology, and to give one time IV lasix (see MAR) if okay with Nephro. Dr. Santosh Frost on call, okay to give IV lasix. Patient updated, is in no distress at this time. Will continue to monitor.

## 2020-08-26 NOTE — PROGRESS NOTES
HEMODIALYSIS POST TREATMENT NOTE    Treatment time ordered: 3 Hours    Actual treatment time: 3 Hours    UltraFiltration Goal: 0 fluid removal this tx  UltraFiltration Removed: 0 ml      Pre Treatment weight: 78.4 kg  Post Treatment weight: 78.4 kg  Estimated Dry Weight: TBD    Access used:     Central Venous Catheter: CVC right chest wall, WNL. Dressing changed. Internal Access:        Access Flow: Good     Sign and symptoms of infection: N/A       If YES:     Medications or blood products given: N/A    Regular outpatient schedule: ANGELY tx # 2    Summary of response to treatment: Patient tolerated HD well this date. No fluid removal ordered. Assess flow ran per orders. No interventions required for BP support. Patient denied any discomfort throughout treatment. Explain if orders NOT met, was physician notified    ACES flowsheet faxed to patient unit/ placed in patient chart: Target goal met    Post assessment completed: Yes    Report given to: Abigail Abbasi RN      * Intra-treatment documented Safety Checks include the followin) Access and face visible at all times. 2) All connections and blood lines are secure with no kinks. 3) NVL alarm engaged. 4) Hemosafe device applied (if applicable). 5) No collapse of Arterial or Venous blood chambers. 6) All blood lines / pump segments in the air detectors.

## 2020-08-26 NOTE — PLAN OF CARE
Problem: Pain:  Description: Pain management should include both nonpharmacologic and pharmacologic interventions.   Goal: Pain level will decrease  Description: Pain level will decrease  Outcome: Ongoing  Goal: Control of acute pain  Description: Control of acute pain  Outcome: Ongoing  Goal: Control of chronic pain  Description: Control of chronic pain  Outcome: Ongoing     Problem: Tissue Perfusion - Renal, Altered:  Goal: Electrolytes within specified parameters  Description: Electrolytes within specified parameters  Outcome: Ongoing  Goal: Urine creatinine clearance will be within specified parameters  Description: Urine creatinine clearance will be within specified parameters  Outcome: Ongoing  Goal: Serum creatinine will be within specified parameters  Description: Serum creatinine will be within specified parameters  Outcome: Ongoing  Goal: Ability to achieve a balanced intake and output will improve  Description: Ability to achieve a balanced intake and output will improve  Outcome: Ongoing     Problem: Falls - Risk of:  Goal: Will remain free from falls  Description: Will remain free from falls  Outcome: Ongoing  Goal: Absence of physical injury  Description: Absence of physical injury  Outcome: Ongoing

## 2020-08-26 NOTE — PROGRESS NOTES
Patient transferred to 1004 by wheelchair from ICU . Belongings and medications with the patient. Family aware of the transfer. Condition satisfactory upon transfer. VSS, pulse ox low at 88% RA; placed him on 2L NC and now 92%.

## 2020-08-26 NOTE — PROGRESS NOTES
[facility]    Progress Note    8/26/2020    9:24 AM    Name:   Osito Huynh  MRN:     [id number]     [place]:      [de-identified]   Room:   [address] [place] [address] Day:  2  Admit Date:  8/24/2020  9:18 AM    PCP:   No primary care provider on file. Code Status:  Full Code    Subjective:     C/C:   Chief Complaint   Patient presents with    Abdominal Pain    Emesis     x 3 days     Interval History Status:  S/p his first HD treament yesterday, creatinine 11.47, NA 124today. Still slightly hypertensive but BPs improving . Urine output improved, 875ml out in the last 24 hours   Brief History:   Osito Huynh is a 50 y.o.  male who presents with Abdominal Pain and Emesis (x 3 days)and is admitted to the hospital for the management of Acute renal failure (ARF) Patient complains of 8 out of 10  right upper and lower quadrant abdominal pain, nausea and vomiting for the last 3 to 4 days. He has had poor oral intake however has managed to keep some fluids down. He tried taking Pepto-Bismol without relief. He denies any fever, chills, recent sick contacts, chest pain, shortness of breath or diarrhea. He does take NSAIDs occasionally for history of arthritis but states he does not take them daily and has not taken more than recommended. He does report minimal urine output. He is a daily drinker, he drinks 6 beers per day but sometimes more. He denies any similar issues in the past and his previous creatinine has been within normal limits. he denies any drug use other than marijuana. Initial ED evaluation revealed a creatinine of 12.2, BUN  83 sodium of 119, ALT 1101 and .    Review of Systems:     Constitutional:  negative for chills, fevers, sweats  Respiratory:  negative for cough, dyspnea on exertion, shortness of breath, wheezing  Cardiovascular:  negative for chest pain, chest pressure/discomfort, lower extremity edema, 9.3 7.9 11.0   RBC 4.26 3.69* 3.70*   HGB 13.8 11.8* 12.2*   HCT 38.6* 33.8* 34.2*   MCV 90.6 91.6 92.4   MCH 32.4 32.0 33.0   MCHC 35.8* 34.9* 35.7*   RDW 11.8 11.9 12.1   * 104* 120*   MPV 10.5 11.3 10.6   INR  --  1.1 1.1     Chemistry:  Recent Labs     08/24/20  0930  08/25/20  0416  08/25/20  1807 08/25/20 2309 08/26/20  0549 08/26/20  0806   *   < > 123*   < > 123* 124* 125* 124*   K 3.9   < > 3.9   < > 3.4* 3.8 4.2 4.2   CL 75*   < > 84*   < > 83* 83* 86* 85*   CO2 22   < > 18*   < > 22 24 22 22   GLUCOSE 100*   < > 100*   < > 139* 99 108*  --    BUN 83*   < > 92*   < > 60* 63* 65*  --    CREATININE 12.26*   < > 13.54*   < > 9.69* 10.30* 11.51*  --    MG  --   --  1.7  --  1.5*  --  1.9  --    ANIONGAP 22*   < > 21*   < > 18* 17 17 17   LABGLOM 4*   < > 4*   < > 6* 5* 5*  --    GFRAA 5*   < > 5*   < > 7* 7* 6*  --    CALCIUM 8.5*   < > 7.7*   < > 8.6 8.3* 8.0*  --    PHOS  --   --  5.9*  --   --   --   --   --    CKTOTAL 202  --   --   --   --   --   --   --     < > = values in this interval not displayed. Recent Labs     08/24/20  0930 08/24/20  1557 08/25/20  0416  08/25/20  1807 08/25/20 2309 08/26/20  0549   PROT 6.3* 5.3*  5.7* 5.3*   < > 6.0* 6.1* 5.4*   LABALBU 3.9 3.5 3.3*   < > 3.6 3.7 3.3*   TSH  --  1.96  --   --   --   --   --    * 74* 49*   < > 45* 43* 31   ALT 1,101* 876* 656*   < > 612* 566* 447*   LDH  --   --  261*  --   --   --   --    ALKPHOS 69 61 57   < > 69 68 57   BILITOT 0.79 0.75 0.65   < > 0.71 0.88 0.81   BILIDIR 0.41*  --   --   --   --   --   --    AMYLASE 22*  --   --   --   --   --   --    LIPASE 31  --   --   --   --   --   --     < > = values in this interval not displayed.      ABG:  Lab Results   Component Value Date    POCPH 7.47 08/24/2020    POCPCO2 22 08/24/2020    POCPO2 78 08/24/2020    POCHCO3 15.9 08/24/2020    NBEA 8 08/24/2020    PBEA NOT REPORTED 08/24/2020    BGC9RUR 17 08/24/2020    CDXB9LZG 97 08/24/2020    FIO2 21.0 08/24/2020     No results found for: SPECIAL  No results found for: CULTURE    Radiology:  Ct Abdomen Pelvis Wo Contrast    Result Date: 8/25/2020  1. Limited by lack of IV contrast.  No focal inflammatory process identified in the abdomen or pelvis. 2.  Mild scattered nonspecific stranding in the upper mesenteric and omental fat. There is also a tiny amount of free fluid in the pelvis. These findings are nonspecific but could be related to enteritis. 3.  Mild diffuse urinary bladder wall thickening, likely due to incomplete distention. 4.  Multiple small lucencies noted in the bilateral iliac bones. These are nonspecific and could be related to heterogeneous bone mineralization. Other etiologies are not excluded. If the patient has a history of malignancy, consider bone scan for further evaluation. Xr Abdomen (kub) (single Ap View)    Result Date: 8/25/2020  No evidence of bowel obstruction. Us Renal Complete    Result Date: 8/24/2020  1. Echogenic kidneys, compatible with medical renal disease. 2. No hydronephrosis.        Physical Examination:        General appearance:  alert, cooperative and no distress  Mental Status:  oriented to person, place and time and normal affect  Lungs:  clear to auscultation bilaterally, normal effort  Heart:  regular rate and rhythm, no murmur  Abdomen:  soft, mild tenderness to RUQ, nondistended, normal bowel sounds, no masses, hepatomegaly, splenomegaly  Extremities:  no edema, redness, tenderness in the calves  Skin:  no gross lesions, rashes, induration    Assessment:        Hospital Problems           Last Modified POA    * (Principal) Acute renal failure (ARF) ([address] 8/24/2020 Yes    Alcohol dependence ([address] 8/24/2020 Yes    Cannabis abuse 8/24/2020 Yes    Bipolar 1 disorder ([address] 8/24/2020 Yes    Tobacco use 8/24/2020 Yes    Schizoaffective disorder, depressive type ([address] 8/24/2020 Yes    Hyponatremia 8/24/2020 Yes    Elevated LFTs 8/24/2020 Yes    Thrombocytopenia ([address] 8/24/2020 Yes    Acute kidney injury ([facility] 8/24/2020 Yes          Plan:        1. S/p HD yesterday, creatinine 11.47 today. Continue strict I's and O's, Will get HD again today, managed per nephrology   2. Monitor labs, hypernatremia improving, Na 124. Continue IV fluids. LFTs continue to trend down, AST back to normal,    3. Pain control as needed   4. discontinue CIWA protocol, no signs of withdrawal and he has not required any ativan   5. Renal diet with fluid restriction   6. Continue DVT prophylaxis  7. Telemetry monitoring  8. Monitor and control blood pressure, start coreg BID    9. Transfer to [facility] down   10.  Plan discussed with patient and staff      CA VIDAL NP  8/26/2020  9:24 AM

## 2020-08-26 NOTE — PROGRESS NOTES
Pt noted to have SOB. Increased oxygen per NC up to 5L and patient now sating at 92-93% during in room dialysis. Respiratory department made aware. To come and see patient.

## 2020-08-26 NOTE — PROGRESS NOTES
Bedside shift report completed. Nephrology paged by oncoming [address] and awaiting call back by Dr. Kathia Connors who is on call for nephrology. Pt currently resting in bed and IV fluid stopped. Pt states that he feels a bit better and vitals are currently stable, but patient remains on 6LNC. Katt Thompson, RN aware of all information and will be awaiting Dr. Charles Nicely call for information and confirmation of administration of Lasix.

## 2020-08-26 NOTE — PROGRESS NOTES
Talked with Mike Martin CNP for [facility] about change in patient condition, labs and the xray results. Per Mol, stop iv fluids, give 40 mg ivp of lasix if ok with Dr. Darin Naik, nephrology and notify them of the chest xray results.

## 2020-08-27 LAB
ALBUMIN SERPL-MCNC: 2.9 G/DL (ref 3.5–5.2)
ALBUMIN SERPL-MCNC: 2.9 G/DL (ref 3.5–5.2)
ALBUMIN SERPL-MCNC: 3 G/DL (ref 3.5–5.2)
ALBUMIN SERPL-MCNC: 3.4 G/DL (ref 3.5–5.2)
ALBUMIN/GLOBULIN RATIO: ABNORMAL (ref 1–2.5)
ALP BLD-CCNC: 52 U/L (ref 40–129)
ALP BLD-CCNC: 52 U/L (ref 40–129)
ALP BLD-CCNC: 54 U/L (ref 40–129)
ALP BLD-CCNC: 56 U/L (ref 40–129)
ALT SERPL-CCNC: 224 U/L (ref 5–41)
ALT SERPL-CCNC: 254 U/L (ref 5–41)
ALT SERPL-CCNC: 279 U/L (ref 5–41)
ALT SERPL-CCNC: 290 U/L (ref 5–41)
ANION GAP SERPL CALCULATED.3IONS-SCNC: 10 MMOL/L (ref 9–17)
ANION GAP SERPL CALCULATED.3IONS-SCNC: 11 MMOL/L (ref 9–17)
ANION GAP SERPL CALCULATED.3IONS-SCNC: 12 MMOL/L (ref 9–17)
ANION GAP SERPL CALCULATED.3IONS-SCNC: 13 MMOL/L (ref 9–17)
ANION GAP SERPL CALCULATED.3IONS-SCNC: 13 MMOL/L (ref 9–17)
ANION GAP SERPL CALCULATED.3IONS-SCNC: 14 MMOL/L (ref 9–17)
AST SERPL-CCNC: 19 U/L
AST SERPL-CCNC: 19 U/L
AST SERPL-CCNC: 20 U/L
AST SERPL-CCNC: 24 U/L
BILIRUB SERPL-MCNC: 0.57 MG/DL (ref 0.3–1.2)
BILIRUB SERPL-MCNC: 0.7 MG/DL (ref 0.3–1.2)
BILIRUB SERPL-MCNC: 0.86 MG/DL (ref 0.3–1.2)
BILIRUB SERPL-MCNC: 1.1 MG/DL (ref 0.3–1.2)
BNP INTERPRETATION: ABNORMAL
BUN BLDV-MCNC: 19 MG/DL (ref 6–20)
BUN BLDV-MCNC: 22 MG/DL (ref 6–20)
BUN BLDV-MCNC: 25 MG/DL (ref 6–20)
BUN BLDV-MCNC: 41 MG/DL (ref 6–20)
BUN/CREAT BLD: 4 (ref 9–20)
BUN/CREAT BLD: 5 (ref 9–20)
CALCIUM SERPL-MCNC: 8.1 MG/DL (ref 8.6–10.4)
CALCIUM SERPL-MCNC: 8.2 MG/DL (ref 8.6–10.4)
CALCIUM SERPL-MCNC: 8.2 MG/DL (ref 8.6–10.4)
CALCIUM SERPL-MCNC: 8.6 MG/DL (ref 8.6–10.4)
CHLORIDE BLD-SCNC: 94 MMOL/L (ref 98–107)
CHLORIDE BLD-SCNC: 95 MMOL/L (ref 98–107)
CHLORIDE BLD-SCNC: 95 MMOL/L (ref 98–107)
CHLORIDE BLD-SCNC: 97 MMOL/L (ref 98–107)
CHLORIDE BLD-SCNC: 98 MMOL/L (ref 98–107)
CHLORIDE BLD-SCNC: 98 MMOL/L (ref 98–107)
CO2: 22 MMOL/L (ref 20–31)
CO2: 22 MMOL/L (ref 20–31)
CO2: 24 MMOL/L (ref 20–31)
CO2: 25 MMOL/L (ref 20–31)
CO2: 27 MMOL/L (ref 20–31)
CO2: 27 MMOL/L (ref 20–31)
CREAT SERPL-MCNC: 5.2 MG/DL (ref 0.7–1.2)
CREAT SERPL-MCNC: 5.94 MG/DL (ref 0.7–1.2)
CREAT SERPL-MCNC: 6.56 MG/DL (ref 0.7–1.2)
CREAT SERPL-MCNC: 8.97 MG/DL (ref 0.7–1.2)
GFR AFRICAN AMERICAN: 11 ML/MIN
GFR AFRICAN AMERICAN: 12 ML/MIN
GFR AFRICAN AMERICAN: 14 ML/MIN
GFR AFRICAN AMERICAN: 8 ML/MIN
GFR NON-AFRICAN AMERICAN: 10 ML/MIN
GFR NON-AFRICAN AMERICAN: 12 ML/MIN
GFR NON-AFRICAN AMERICAN: 6 ML/MIN
GFR NON-AFRICAN AMERICAN: 9 ML/MIN
GFR SERPL CREATININE-BSD FRML MDRD: ABNORMAL ML/MIN/{1.73_M2}
GLUCOSE BLD-MCNC: 101 MG/DL (ref 70–99)
GLUCOSE BLD-MCNC: 112 MG/DL (ref 70–99)
GLUCOSE BLD-MCNC: 152 MG/DL (ref 70–99)
GLUCOSE BLD-MCNC: 96 MG/DL (ref 70–99)
HBV SURFACE AB TITR SER: >1000 MIU/ML
HCT VFR BLD CALC: 32.8 % (ref 40.7–50.3)
HEMOGLOBIN: 11.1 G/DL (ref 13–17)
INR BLD: 1.3
LV EF: 60 %
LVEF MODALITY: NORMAL
MAGNESIUM: 1.8 MG/DL (ref 1.6–2.6)
MCH RBC QN AUTO: 32.4 PG (ref 25.2–33.5)
MCHC RBC AUTO-ENTMCNC: 33.8 G/DL (ref 28.4–34.8)
MCV RBC AUTO: 95.6 FL (ref 82.6–102.9)
NRBC AUTOMATED: 0 PER 100 WBC
PDW BLD-RTO: 12.8 % (ref 11.8–14.4)
PLATELET # BLD: 146 K/UL (ref 138–453)
PMV BLD AUTO: 11 FL (ref 8.1–13.5)
POTASSIUM SERPL-SCNC: 4.2 MMOL/L (ref 3.7–5.3)
POTASSIUM SERPL-SCNC: 4.2 MMOL/L (ref 3.7–5.3)
POTASSIUM SERPL-SCNC: 4.3 MMOL/L (ref 3.7–5.3)
POTASSIUM SERPL-SCNC: 4.3 MMOL/L (ref 3.7–5.3)
POTASSIUM SERPL-SCNC: 4.4 MMOL/L (ref 3.7–5.3)
POTASSIUM SERPL-SCNC: 4.6 MMOL/L (ref 3.7–5.3)
PRO-BNP: ABNORMAL PG/ML
PROTHROMBIN TIME: 16.3 SEC (ref 11.5–14.2)
RBC # BLD: 3.43 M/UL (ref 4.21–5.77)
SODIUM BLD-SCNC: 130 MMOL/L (ref 135–144)
SODIUM BLD-SCNC: 132 MMOL/L (ref 135–144)
SODIUM BLD-SCNC: 132 MMOL/L (ref 135–144)
SODIUM BLD-SCNC: 133 MMOL/L (ref 135–144)
SODIUM BLD-SCNC: 134 MMOL/L (ref 135–144)
SODIUM BLD-SCNC: 136 MMOL/L (ref 135–144)
TOTAL PROTEIN: 5.3 G/DL (ref 6.4–8.3)
TOTAL PROTEIN: 5.6 G/DL (ref 6.4–8.3)
TOTAL PROTEIN: 5.7 G/DL (ref 6.4–8.3)
TOTAL PROTEIN: 6.2 G/DL (ref 6.4–8.3)
WBC # BLD: 11.9 K/UL (ref 3.5–11.3)

## 2020-08-27 PROCEDURE — 83880 ASSAY OF NATRIURETIC PEPTIDE: CPT

## 2020-08-27 PROCEDURE — APPSS30 APP SPLIT SHARED TIME 16-30 MINUTES: Performed by: NURSE PRACTITIONER

## 2020-08-27 PROCEDURE — 86317 IMMUNOASSAY INFECTIOUS AGENT: CPT

## 2020-08-27 PROCEDURE — 83735 ASSAY OF MAGNESIUM: CPT

## 2020-08-27 PROCEDURE — 36415 COLL VENOUS BLD VENIPUNCTURE: CPT

## 2020-08-27 PROCEDURE — 6370000000 HC RX 637 (ALT 250 FOR IP): Performed by: NURSE PRACTITIONER

## 2020-08-27 PROCEDURE — 2060000000 HC ICU INTERMEDIATE R&B

## 2020-08-27 PROCEDURE — 6370000000 HC RX 637 (ALT 250 FOR IP): Performed by: INTERNAL MEDICINE

## 2020-08-27 PROCEDURE — 2700000000 HC OXYGEN THERAPY PER DAY

## 2020-08-27 PROCEDURE — 2580000003 HC RX 258: Performed by: NURSE PRACTITIONER

## 2020-08-27 PROCEDURE — 85027 COMPLETE CBC AUTOMATED: CPT

## 2020-08-27 PROCEDURE — 90935 HEMODIALYSIS ONE EVALUATION: CPT

## 2020-08-27 PROCEDURE — 94760 N-INVAS EAR/PLS OXIMETRY 1: CPT

## 2020-08-27 PROCEDURE — 99233 SBSQ HOSP IP/OBS HIGH 50: CPT | Performed by: INTERNAL MEDICINE

## 2020-08-27 PROCEDURE — 93306 TTE W/DOPPLER COMPLETE: CPT

## 2020-08-27 PROCEDURE — 80051 ELECTROLYTE PANEL: CPT

## 2020-08-27 PROCEDURE — 2500000003 HC RX 250 WO HCPCS: Performed by: INTERNAL MEDICINE

## 2020-08-27 PROCEDURE — 85610 PROTHROMBIN TIME: CPT

## 2020-08-27 PROCEDURE — 94660 CPAP INITIATION&MGMT: CPT

## 2020-08-27 PROCEDURE — 80053 COMPREHEN METABOLIC PANEL: CPT

## 2020-08-27 PROCEDURE — 6360000002 HC RX W HCPCS: Performed by: NURSE PRACTITIONER

## 2020-08-27 RX ORDER — HYDROCODONE BITARTRATE AND ACETAMINOPHEN 5; 325 MG/1; MG/1
1 TABLET ORAL EVERY 6 HOURS PRN
Status: DISCONTINUED | OUTPATIENT
Start: 2020-08-27 | End: 2020-09-01 | Stop reason: HOSPADM

## 2020-08-27 RX ADMIN — HEPARIN SODIUM 5000 UNITS: 5000 INJECTION INTRAVENOUS; SUBCUTANEOUS at 14:44

## 2020-08-27 RX ADMIN — CARVEDILOL 6.25 MG: 6.25 TABLET, FILM COATED ORAL at 08:02

## 2020-08-27 RX ADMIN — ACETAMINOPHEN 650 MG: 325 TABLET ORAL at 08:02

## 2020-08-27 RX ADMIN — HEPARIN SODIUM 5000 UNITS: 5000 INJECTION INTRAVENOUS; SUBCUTANEOUS at 05:59

## 2020-08-27 RX ADMIN — Medication 1 ML: at 12:42

## 2020-08-27 RX ADMIN — Medication 1.2 ML: at 12:42

## 2020-08-27 RX ADMIN — HEPARIN SODIUM 5000 UNITS: 5000 INJECTION INTRAVENOUS; SUBCUTANEOUS at 20:22

## 2020-08-27 RX ADMIN — CARVEDILOL 6.25 MG: 6.25 TABLET, FILM COATED ORAL at 17:16

## 2020-08-27 RX ADMIN — SODIUM CHLORIDE, PRESERVATIVE FREE 10 ML: 5 INJECTION INTRAVENOUS at 08:02

## 2020-08-27 RX ADMIN — OLANZAPINE 10 MG: 5 TABLET, FILM COATED ORAL at 20:22

## 2020-08-27 RX ADMIN — HYDROCODONE BITARTRATE AND ACETAMINOPHEN 1 TABLET: 5; 325 TABLET ORAL at 04:00

## 2020-08-27 RX ADMIN — ACETAMINOPHEN 650 MG: 325 TABLET ORAL at 14:45

## 2020-08-27 RX ADMIN — Medication 100 MG: at 08:02

## 2020-08-27 RX ADMIN — SODIUM CHLORIDE, PRESERVATIVE FREE 10 ML: 5 INJECTION INTRAVENOUS at 20:23

## 2020-08-27 ASSESSMENT — PAIN SCALES - GENERAL
PAINLEVEL_OUTOF10: 7
PAINLEVEL_OUTOF10: 0
PAINLEVEL_OUTOF10: 8
PAINLEVEL_OUTOF10: 8
PAINLEVEL_OUTOF10: 0
PAINLEVEL_OUTOF10: 5
PAINLEVEL_OUTOF10: 0
PAINLEVEL_OUTOF10: 0

## 2020-08-27 ASSESSMENT — PAIN DESCRIPTION - PAIN TYPE
TYPE: ACUTE PAIN
TYPE: ACUTE PAIN
TYPE: CHRONIC PAIN

## 2020-08-27 ASSESSMENT — PAIN DESCRIPTION - FREQUENCY
FREQUENCY: INTERMITTENT
FREQUENCY: INTERMITTENT
FREQUENCY: CONTINUOUS

## 2020-08-27 ASSESSMENT — PAIN DESCRIPTION - ORIENTATION
ORIENTATION: ANTERIOR

## 2020-08-27 ASSESSMENT — PAIN DESCRIPTION - DESCRIPTORS
DESCRIPTORS: ACHING
DESCRIPTORS: ACHING;CRAMPING

## 2020-08-27 ASSESSMENT — PAIN DESCRIPTION - LOCATION
LOCATION: ABDOMEN

## 2020-08-27 NOTE — PROGRESS NOTES
Renal Progress Note    Patient :  Mary Murillo; 50 y.o. MRN# [id number]  Location:  5547/9180-47  Attending:  Nisha Guerra MD  Admit Date:  8/24/2020   Hospital Day: 3      Subjective:     Has had 2 dialysis treatments so far, 8/25, 8/26/2020. Del Rio catheter was placed. Urine output fair although in positive balance. Overnight he developed respiratory difficulty, chest x-ray showed presence of pulmonary edema, required Lasix and BiPAP placement. Clearances continue to be poor. Despite dialysis creatinine has gone up to 8.9. Peripheral smear removed, no schistocytes seen, mild eosinophilia. LDH and haptoglobin unremarkable. Serological work-up including work-up for immune complex, pauci immune disease negative. C3 slightly low likely from liver disease. HIV 1 and 2, hepatitis B and C serologies negative, rheumatoid factor negative. Previous records and history reviewed. Creatinine was 0.9 in October 2019. He does take NSAIDs on a fairly regular basis. Takes it mostly for knee pain. Takes ibuprofen for breakthrough pain and meloxicam at least once or twice a week. Current symptoms started about 4 to 5 days ago initially with nausea and vomiting followed by abdominal pain. Also noticed decrease in urine output. Symptoms started after he was in a hotel with his friends for a few days. Tells me he was using recreational substances. He denies any fever or chills or mental status changes or confusion. Denies any tremors. Denies any skin rashes or pruritus. No history of venous thrombosis. Describes chronic knee pain although clinically does not appear to have significant arthritis. On presentation was found to have a creatinine 113, BUN of 90. Also had a sodium of 122. Urine drug screen was positive for cocaine. Urinalysis which was a non-Del Rio specimen showed 10-20 RBCs and 2-5 WBCs. Also showed a urine protein creatinine ratio from 7.   Ultrasound showed echogenic kidneys with kidney size around 12 cm on the left and 14 cm in the right. CT abdominal also showed nonspecific stranding in the upper mesenteric and omental fat. Labs also showed thrombocytopenia with a platelet count of 689. Hemoglobin was 11.8. Labs and ABG also showed respiratory alkalosis and anion gap metabolic acidosis and metabolic alkalosis. Rest of serologies pending. HIV 1 and 2-, hepatitis serologies negative. Liver enzymes showed high ALT at 1101 and . Outpatient Medications:     Medications Prior to Admission: OLANZapine (ZYPREXA) 10 MG tablet, Take 10 mg by mouth nightly   cyclobenzaprine (FLEXERIL) 10 MG tablet, Take 10 mg by mouth daily as needed for Muscle spasms  ibuprofen (ADVIL;MOTRIN) 800 MG tablet, Take 1 tablet by mouth every 8 hours as needed for Pain or Fever  meloxicam (MOBIC) 15 MG tablet, TAKE 1 TABLET BY MOUTH DAILY  [DISCONTINUED] MAPAP 500 MG tablet, TAKE 2 TABLETS BY MOUTH EVERY 6 HOURS AS NEEDED FOR PAIN  [DISCONTINUED] V-R CALAMINE LOTN, Apply 1 Act topically 2 times daily  [DISCONTINUED] sertraline (ZOLOFT) 50 MG tablet, Take 1 tablet by mouth daily. (Patient taking differently: Take 50 mg by mouth 2 times daily )    Current Medications:     Scheduled Meds:    carvedilol  6.25 mg Oral BID WC    sodium chloride flush  10 mL Intravenous 2 times per day    heparin (porcine)  5,000 Units Subcutaneous 3 times per day    thiamine  100 mg Oral Daily    OLANZapine  10 mg Oral Nightly     Continuous Infusions:     PRN Meds:  HYDROcodone 5 mg - acetaminophen, sodium citrate, sodium citrate, heparin (porcine), heparin (porcine), sodium chloride flush, acetaminophen **OR** acetaminophen, promethazine **OR** ondansetron, nicotine, LORazepam **OR** LORazepam **OR** LORazepam **OR** LORazepam **OR** LORazepam **OR** LORazepam **OR** LORazepam **OR** LORazepam    Input/Output:       I/O last 3 completed shifts: In: 2009.2 [P.O.:750; I.V.:1259.2]  Out: 1125 [Urine:1125].       Patient Vitals for the past 96 hrs (Last 3 readings):   Weight   20 1742 172 lb 13.5 oz (78.4 kg)   20 1420 172 lb 13.5 oz (78.4 kg)   20 1659 172 lb 13.5 oz (78.4 kg)       Vital Signs:   Temperature:  Temp: 98.4 °F (36.9 °C)  TMax:   Temp (24hrs), Av.3 °F (36.8 °C), Min:97.8 °F (36.6 °C), Max:98.5 °F (36.9 °C)    Respirations:  Resp: 18  Pulse:   Pulse: 94  BP:    BP: (!) 140/84  BP Range: Systolic (56XVK), SGV:833 , Min:123 , QYW:874       Diastolic (50VOY), WZA:30, Min:63, Max:97      Physical Examination:     General:  AAO x 3, speaking in full sentences, no accessory muscle use. HEENT: Atraumatic, normocephalic, no throat congestion, moist mucosa. Eyes:   Pupils equal, round and reactive to light, EOMI. Neck:   No JVD, no thyromegaly, no lymphadenopathy. Chest:  Bilateral vesicular breath sounds, no rales or wheezes. Cardiac:  S1 S2 RR, no murmurs, gallops or rubs, JVP not raised. Abdomen: Soft, non-tender, no masses or organomegaly, BS audible. :   No suprapubic or flank tenderness. Neuro:  AAO x 3, No FND. SKIN:  No rashes, good skin turgor. Extremities:  No edema, palpable peripheral pulses, no calf tenderness.   Peripheral pulses are palpable and normal  No asterixis  Labs:       Recent Labs     20  0416 20  0549 20  0617   WBC 7.9 11.0 11.9*   RBC 3.69* 3.70* 3.43*   HGB 11.8* 12.2* 11.1*   HCT 33.8* 34.2* 32.8*   MCV 91.6 92.4 95.6   MCH 32.0 33.0 32.4   MCHC 34.9* 35.7* 33.8   RDW 11.9 12.1 12.8   * 120* 146   MPV 11.3 10.6 11.0      BMP:   Recent Labs     20  1901 20  2326 20  0153 20  0617   * 131* 130* 133*   K 4.2 4.3 4.4 4.2   CL 94* 95* 94* 98   CO2 27 22 22 22   BUN 37* 38*  --  41*   CREATININE 7.27* 7.78*  --  8.97*   GLUCOSE 151* 133*  --  152*   CALCIUM 8.2* 8.3*  --  8.2*      Phosphorus:     Recent Labs     20  0416   PHOS 5.9*     Magnesium:    Recent Labs     20  1807 20  0549 20  0617   MG 1.5* 1.9 1.8     Albumin:    Recent Labs     08/26/20  1901 08/26/20  2326 08/27/20  0617   LABALBU 3.2* 3.3* 2.9*     BNP:    No results found for: BNP  YULIA:      Lab Results   Component Value Date    YULIA NEGATIVE 08/24/2020     SPEP:  Lab Results   Component Value Date    PROT 5.3 08/27/2020    ALBCAL 3.4 08/24/2020    ALBPCT 64 08/24/2020    LABALPH 0.2 08/24/2020    LABALPH 0.5 08/24/2020    A1PCT 3 08/24/2020    A2PCT 9 08/24/2020    LABBETA 0.6 08/24/2020    BETAPCT 11 08/24/2020    GAMGLOB 0.7 08/24/2020    GGPCT 12 08/24/2020    PATH ELECTRONICALLY SIGNED.  Tammy Gutierrez M.D. 08/24/2020     UPEP:   No results found for: LABPE  C3:     Lab Results   Component Value Date    C3 70 08/24/2020     C4:     Lab Results   Component Value Date    C4 15 08/24/2020     MPO ANCA:     Lab Results   Component Value Date    MPO 5 08/24/2020     PR3 ANCA:     Lab Results   Component Value Date    PR3 9 08/24/2020     Anti-GBM:   No results found for: GBMABIGG  Hep BsAg:         Lab Results   Component Value Date    HEPBSAG NONREACTIVE 08/24/2020     Hep C AB:          Lab Results   Component Value Date    HEPCAB NONREACTIVE 08/24/2020       Urinalysis/Chemistries:      Lab Results   Component Value Date    NITRU NEGATIVE 08/25/2020    COLORU YELLOW 08/25/2020    PHUR 7.0 08/25/2020    WBCUA 5 TO 10 08/25/2020    RBCUA 10 TO 20 08/25/2020    MUCUS NOT REPORTED 08/25/2020    TRICHOMONAS NOT REPORTED 08/25/2020    YEAST NOT REPORTED 08/25/2020    BACTERIA NOT REPORTED 08/25/2020    SPECGRAV 1.010 08/25/2020    LEUKOCYTESUR NEGATIVE 08/25/2020    UROBILINOGEN Normal 08/25/2020    BILIRUBINUR NEGATIVE 08/25/2020    GLUCOSEU NEGATIVE 08/25/2020    KETUA NEGATIVE 08/25/2020    AMORPHOUS NOT REPORTED 08/25/2020     Urine Sodium:     Lab Results   Component Value Date    ANDREA 43 08/24/2020     Urine Potassium:  No results found for: KUR  Urine Chloride:  No results found for: CLUR  Urine Osmolarity:   Lab Results   Component Value Date MERLEOU 209 08/24/2020     Urine Protein:   No components found for: TOTALPROTEIN, URINE   Urine Creatinine:     Lab Results   Component Value Date    LABCREA 63.1 08/24/2020     Urine Eosinophils:  No components found for: UEOS    Radiology:     CXR:     Assessment:     1. Acute Kidney Injury: Suspected acute tubular necrosis from intravascular volume depletion from decreased intake, nausea vomiting, NSAID use. Other possibilities include acute interstitial nephritis from NSAIDs. Urine studies do indicate RBCs and WBCs in the urine. Possibility of underlying connective tissue disease/rapidly progressing GN less likely based on patient's presentation. Patient does have proteinuria about 7 g. Creatinine was 0.9 in October 2019, now up to 12, oligo anuric and persists. Clinically was showing signs of uremia including abdominal pain nausea and vomiting, which have now improved significantly after hemodialysis was done twice. Renal function has not shown any significant improvement since admission. Did develop volume overload overnight. 2.  Nephrotic range proteinuria hard to assess importance given acute kidney injury  3. Thrombocytopenia secondary to  chronic liver disease  4. Echogenic and somewhat large size kidneys warrant work-up for infiltrative diseases  5. Schizoaffective disorder  6. Substance abuse  7. Anemia  8. Hyperphosphatemia   9. Euvolemic hyponatremia secondary to beer [place]    Plan:   1. Discontinue Del Rio   2. Hemodialysis today, aim for 2 to 3 L of fluid removal   3. We will arrange for kidney biopsy as renal function is continued to be poor despite adequate hydration, Del Rio catheter placement and serological test have been all negative. 4.  Increase oral intake  5. Above discussed with the patient and he is agreeable   6. Keep systolic pressure more than 110  7. We will arrange for outpatient dialysis part at the [facility] or [place]   8.   Thank you for this interesting

## 2020-08-27 NOTE — PLAN OF CARE
Problem: Pain:  Goal: Pain level will decrease  Description: Pain level will decrease  8/27/2020 1426 by Celso Hayward RN  Outcome: Ongoing   Pt currently resting in bed. Tylenol currently being used for pain. Will continue to monitor. Problem: Tissue Perfusion - Renal, Altered:  Goal: Electrolytes within specified parameters  Description: Electrolytes within specified parameters  Outcome: Ongoing   Monitoring. Problem: Tissue Perfusion - Renal, Altered:  Goal: Urine creatinine clearance will be within specified parameters  Description: Urine creatinine clearance will be within specified parameters  Outcome: Ongoing   Monitoring. Pt had dialysis today. Will continue to monitor. Problem: Tissue Perfusion - Renal, Altered:  Goal: Serum creatinine will be within specified parameters  Description: Serum creatinine will be within specified parameters  Outcome: Ongoing   Monitoring. Problem: Tissue Perfusion - Renal, Altered:  Goal: Ability to achieve a balanced intake and output will improve  Description: Ability to achieve a balanced intake and output will improve  Outcome: Ongoing   Vitals stable. Pt had dialysis today. Monitoring. Problem: Falls - Risk of:  Goal: Will remain free from falls  Description: Will remain free from falls  8/27/2020 1426 by Celso Hayward RN  Outcome: Ongoing   No falls this shift. Problem: Falls - Risk of:  Goal: Absence of physical injury  Description: Absence of physical injury  Outcome: Ongoing   No injury this shift. Problem: Gas Exchange - Impaired:  Goal: Levels of oxygenation will improve  Description: Levels of oxygenation will improve  8/27/2020 1426 by Celso Hayward RN  Outcome: Ongoing   Pt continues on oxygen per NC. Will continue to monitor.    Problem: Breathing Pattern - Ineffective:  Goal: Ability to achieve and maintain a regular respiratory rate will improve  Description: Ability to achieve and maintain a regular respiratory rate will improve  8/27/2020 1426 by Mahad Rush, RN  Outcome: Ongoing   Monitor.

## 2020-08-27 NOTE — PLAN OF CARE
Problem: Pain:  Goal: Pain level will decrease  Description: Pain level will decrease  8/27/2020 0204 by Mojgan Moreno RN  Outcome: Ongoing     Problem: Falls - Risk of:  Goal: Will remain free from falls  Description: Will remain free from falls  8/27/2020 0204 by Mojagn Moreno RN  Outcome: Ongoing     Problem: Breathing Pattern - Ineffective:  Goal: Ability to achieve and maintain a regular respiratory rate will improve  Description: Ability to achieve and maintain a regular respiratory rate will improve  Outcome: Ongoing     Problem: Gas Exchange - Impaired:  Goal: Levels of oxygenation will improve  Description: Levels of oxygenation will improve  Outcome: Ongoing

## 2020-08-27 NOTE — PROGRESS NOTES
wheezing  Cardiovascular:  negative for chest pain, chest pressure/discomfort, lower extremity edema, palpitations  Gastrointestinal:  positive for abdominal pain, negative for constipation, diarrhea, nausea, vomiting  Neurological:  negative for dizziness, headache  Positive for oliguria  Medications: Allergies: Allergies   Allergen Reactions    Codeine     Penicillins     Seasonal        Current Meds:   Scheduled Meds:    carvedilol  6.25 mg Oral BID WC    sodium chloride flush  10 mL Intravenous 2 times per day    heparin (porcine)  5,000 Units Subcutaneous 3 times per day    thiamine  100 mg Oral Daily    OLANZapine  10 mg Oral Nightly     Continuous Infusions:     PRN Meds: HYDROcodone 5 mg - acetaminophen, sodium citrate, sodium citrate, heparin (porcine), heparin (porcine), sodium chloride flush, acetaminophen **OR** acetaminophen, promethazine **OR** ondansetron, nicotine, LORazepam **OR** LORazepam **OR** LORazepam **OR** LORazepam **OR** LORazepam **OR** LORazepam **OR** LORazepam **OR** LORazepam    Data:     Past Medical History:   has a past medical history of Acute renal failure (ARF) ([facility] [address] Bipolar 1 disorder ([facility] [address] Depression, and Osteoarthritis of both knees. Social History:   reports that he has been smoking cigarettes. He has been smoking about 1.00 pack per day. He has never used smokeless tobacco. He reports current alcohol use. He reports previous drug use. Drug: Marijuana. Family History:   Family History   Problem Relation Age of Onset    Heart Disease Father        Vitals:  BP (!) 140/84   Pulse 94   Temp 98.4 °F (36.9 °C) (Oral)   Resp 18   Ht 6' 2\" (1.88 m)   Wt 172 lb 13.5 oz (78.4 kg)   SpO2 92%   BMI 22.19 kg/m²   Temp (24hrs), Av.3 °F (36.8 °C), Min:97.8 °F (36.6 °C), Max:98.5 °F (36.9 °C)    No results for input(s): POCGLU in the last 72 hours. I/O (24Hr):     Intake/Output Summary (Last 24 hours) at 2020 0849  Last data filed at 2020 9427  Gross per 24 hour   Intake 2119.16 ml   Output 1125 ml   Net 994.16 ml       Labs:  Hematology:  Recent Labs     08/25/20  0416 08/26/20  0549 08/27/20  0617   WBC 7.9 11.0 11.9*   RBC 3.69* 3.70* 3.43*   HGB 11.8* 12.2* 11.1*   HCT 33.8* 34.2* 32.8*   MCV 91.6 92.4 95.6   MCH 32.0 33.0 32.4   MCHC 34.9* 35.7* 33.8   RDW 11.9 12.1 12.8   * 120* 146   MPV 11.3 10.6 11.0   INR 1.1 1.1 1.3     Chemistry:  Recent Labs     08/24/20  0930  08/25/20  0416  08/25/20  1807  08/26/20  0549  08/26/20  1901 08/26/20  2326 08/27/20  0153 08/27/20  0617   *   < > 123*   < > 123*   < > 125*   < > 132* 131* 130* 133*   K 3.9   < > 3.9   < > 3.4*   < > 4.2   < > 4.2 4.3 4.4 4.2   CL 75*   < > 84*   < > 83*   < > 86*   < > 94* 95* 94* 98   CO2 22   < > 18*   < > 22   < > 22   < > 27 22 22 22   GLUCOSE 100*   < > 100*   < > 139*   < > 108*   < > 151* 133*  --  152*   BUN 83*   < > 92*   < > 60*   < > 65*   < > 37* 38*  --  41*   CREATININE 12.26*   < > 13.54*   < > 9.69*   < > 11.51*   < > 7.27* 7.78*  --  8.97*   MG  --    < > 1.7  --  1.5*  --  1.9  --   --   --   --  1.8   ANIONGAP 22*   < > 21*   < > 18*   < > 17   < > 11 14 14 13   LABGLOM 4*   < > 4*   < > 6*   < > 5*   < > 8* 8*  --  6*   GFRAA 5*   < > 5*   < > 7*   < > 6*   < > 10* 9*  --  8*   CALCIUM 8.5*   < > 7.7*   < > 8.6   < > 8.0*   < > 8.2* 8.3*  --  8.2*   PHOS  --   --  5.9*  --   --   --   --   --   --   --   --   --    PROBNP  --   --   --   --   --   --   --   --   --   --   --  11,146*   CKTOTAL 202  --   --   --   --   --   --   --   --   --   --   --     < > = values in this interval not displayed.      Recent Labs     08/24/20  0930 08/24/20  1557 08/25/20  0416  08/26/20  1901 08/26/20  2326 08/27/20  0617   PROT 6.3* 5.3*  5.7* 5.3*   < > 5.6* 5.7* 5.3*   LABALBU 3.9 3.5 3.3*   < > 3.2* 3.3* 2.9*   TSH  --  1.96  --   --   --   --   --    * 74* 49*   < > 26 24 19   ALT 1,101* 876* 656*   < > 367* 344* 279*   LDH  --   --  261*  -- --   --   --    ALKPHOS 69 61 57   < > 65 56 52   BILITOT 0.79 0.75 0.65   < > 0.60 0.66 0.57   BILIDIR 0.41*  --   --   --   --   --   --    AMYLASE 22*  --   --   --   --   --   --    LIPASE 31  --   --   --   --   --   --     < > = values in this interval not displayed. ABG:  Lab Results   Component Value Date    POCPH 7.47 08/24/2020    POCPCO2 22 08/24/2020    POCPO2 78 08/24/2020    POCHCO3 15.9 08/24/2020    NBEA 8 08/24/2020    PBEA NOT REPORTED 08/24/2020    RMQ7NJL 17 08/24/2020    RCZC0OEV 97 08/24/2020    FIO2 21.0 08/24/2020     No results found for: SPECIAL  No results found for: CULTURE    Radiology:  Ct Abdomen Pelvis Wo Contrast    Result Date: 8/25/2020  1. Limited by lack of IV contrast.  No focal inflammatory process identified in the abdomen or pelvis. 2.  Mild scattered nonspecific stranding in the upper mesenteric and omental fat. There is also a tiny amount of free fluid in the pelvis. These findings are nonspecific but could be related to enteritis. 3.  Mild diffuse urinary bladder wall thickening, likely due to incomplete distention. 4.  Multiple small lucencies noted in the bilateral iliac bones. These are nonspecific and could be related to heterogeneous bone mineralization. Other etiologies are not excluded. If the patient has a history of malignancy, consider bone scan for further evaluation. Xr Abdomen (kub) (single Ap View)    Result Date: 8/25/2020  No evidence of bowel obstruction. Us Renal Complete    Result Date: 8/24/2020  1. Echogenic kidneys, compatible with medical renal disease. 2. No hydronephrosis.        Physical Examination:        General appearance:  alert, cooperative and no distress  Mental Status:  oriented to person, place and time and normal affect  Lungs:  clear to auscultation bilaterally, normal effort  Heart:  regular rate and rhythm, no murmur  Abdomen:  soft, mild tenderness to RUQ, nondistended, normal bowel sounds, no masses, hepatomegaly, splenomegaly  Extremities:  no edema, redness, tenderness in the calves  Skin:  no gross lesions, rashes, induration    Assessment:        Hospital Problems           Last Modified POA    * (Principal) Acute renal failure (ARF) ([facility] [address] 8/24/2020 Yes    Alcohol dependence ([facility] [address] 8/24/2020 Yes    Cannabis abuse 8/24/2020 Yes    Bipolar 1 disorder ([address] 8/24/2020 Yes    Tobacco use 8/24/2020 Yes    Schizoaffective disorder, depressive type ([address] 8/24/2020 Yes    Hyponatremia 8/24/2020 Yes    Elevated LFTs 8/24/2020 Yes    Thrombocytopenia ([address] 8/24/2020 Yes    Acute kidney injury ([facility] [address] 8/24/2020 Yes          Plan:        1. Kidney function slowly improving, Continue strict I's and O's, Will get HD again today, managed per nephrology, remove donovan   2. Monitor labs, hypernatremia improving, IV fluids discontinued overnght. LFTs continue to trend down, AST back to normal,   3. Pain control as needed   4. discontinue CIWA protocol, no signs of withdrawal and he has not required any ativan   5. Renal diet with fluid restriction   6. Continue DVT prophylaxis  7. Telemetry monitoring  8. Monitor and control blood pressure,, stable   9.  Discharge planning, case management working on obtaining a chair for HD   10. Plan discussed with patient and staff      JEFFERY  [id number] [facility], APRN - NP  8/27/2020  8:49 AM

## 2020-08-27 NOTE — PROGRESS NOTES
Writer in to assess patient after single dose of IV lasix. o2 sat is 91% on 6 liters, Patient still complaining of feeling SOB. On call NP notified of patient complaints and assessment. RT evaluated patient as well and came to the conclusion for patient to use Bipap tonight. Orders received by on call NP. Patient currently tolerating Bipap at this time. Will continue to monitor patient closely.

## 2020-08-28 ENCOUNTER — APPOINTMENT (OUTPATIENT)
Dept: INTERVENTIONAL RADIOLOGY/VASCULAR | Age: 45
DRG: 469 | End: 2020-08-28
Payer: MEDICAID

## 2020-08-28 ENCOUNTER — APPOINTMENT (OUTPATIENT)
Dept: GENERAL RADIOLOGY | Age: 45
DRG: 469 | End: 2020-08-28
Payer: MEDICAID

## 2020-08-28 LAB
ALBUMIN SERPL-MCNC: 2.9 G/DL (ref 3.5–5.2)
ALBUMIN SERPL-MCNC: 3 G/DL (ref 3.5–5.2)
ALBUMIN/GLOBULIN RATIO: ABNORMAL (ref 1–2.5)
ALBUMIN/GLOBULIN RATIO: ABNORMAL (ref 1–2.5)
ALP BLD-CCNC: 46 U/L (ref 40–129)
ALP BLD-CCNC: 53 U/L (ref 40–129)
ALT SERPL-CCNC: 180 U/L (ref 5–41)
ALT SERPL-CCNC: 202 U/L (ref 5–41)
ANION GAP SERPL CALCULATED.3IONS-SCNC: 12 MMOL/L (ref 9–17)
ANION GAP SERPL CALCULATED.3IONS-SCNC: 13 MMOL/L (ref 9–17)
AST SERPL-CCNC: 16 U/L
AST SERPL-CCNC: 17 U/L
BILIRUB SERPL-MCNC: 0.81 MG/DL (ref 0.3–1.2)
BILIRUB SERPL-MCNC: 0.85 MG/DL (ref 0.3–1.2)
BUN BLDV-MCNC: 30 MG/DL (ref 6–20)
BUN BLDV-MCNC: 32 MG/DL (ref 6–20)
BUN/CREAT BLD: 4 (ref 9–20)
BUN/CREAT BLD: 4 (ref 9–20)
CALCIUM SERPL-MCNC: 8.4 MG/DL (ref 8.6–10.4)
CALCIUM SERPL-MCNC: 8.5 MG/DL (ref 8.6–10.4)
CHLORIDE BLD-SCNC: 96 MMOL/L (ref 98–107)
CHLORIDE BLD-SCNC: 97 MMOL/L (ref 98–107)
CO2: 24 MMOL/L (ref 20–31)
CO2: 24 MMOL/L (ref 20–31)
CO2: 25 MMOL/L (ref 20–31)
CO2: 26 MMOL/L (ref 20–31)
CREAT SERPL-MCNC: 7.81 MG/DL (ref 0.7–1.2)
CREAT SERPL-MCNC: 7.82 MG/DL (ref 0.7–1.2)
GFR AFRICAN AMERICAN: 9 ML/MIN
GFR AFRICAN AMERICAN: 9 ML/MIN
GFR NON-AFRICAN AMERICAN: 7 ML/MIN
GFR NON-AFRICAN AMERICAN: 7 ML/MIN
GFR SERPL CREATININE-BSD FRML MDRD: ABNORMAL ML/MIN/{1.73_M2}
GLUCOSE BLD-MCNC: 106 MG/DL (ref 70–99)
GLUCOSE BLD-MCNC: 92 MG/DL (ref 70–99)
HCT VFR BLD CALC: 35.3 % (ref 40.7–50.3)
HEMOGLOBIN: 11.8 G/DL (ref 13–17)
INR BLD: 1.2
MAGNESIUM: 1.7 MG/DL (ref 1.6–2.6)
MCH RBC QN AUTO: 32.8 PG (ref 25.2–33.5)
MCHC RBC AUTO-ENTMCNC: 33.4 G/DL (ref 28.4–34.8)
MCV RBC AUTO: 98.1 FL (ref 82.6–102.9)
NRBC AUTOMATED: 0 PER 100 WBC
PDW BLD-RTO: 13 % (ref 11.8–14.4)
PLATELET # BLD: 186 K/UL (ref 138–453)
PMV BLD AUTO: 10.6 FL (ref 8.1–13.5)
POTASSIUM SERPL-SCNC: 4.3 MMOL/L (ref 3.7–5.3)
POTASSIUM SERPL-SCNC: 4.4 MMOL/L (ref 3.7–5.3)
POTASSIUM SERPL-SCNC: 4.4 MMOL/L (ref 3.7–5.3)
POTASSIUM SERPL-SCNC: 4.6 MMOL/L (ref 3.7–5.3)
PROTHROMBIN TIME: 14.6 SEC (ref 11.5–14.2)
RBC # BLD: 3.6 M/UL (ref 4.21–5.77)
SODIUM BLD-SCNC: 133 MMOL/L (ref 135–144)
SODIUM BLD-SCNC: 133 MMOL/L (ref 135–144)
SODIUM BLD-SCNC: 134 MMOL/L (ref 135–144)
SODIUM BLD-SCNC: 135 MMOL/L (ref 135–144)
TOTAL PROTEIN: 5.6 G/DL (ref 6.4–8.3)
TOTAL PROTEIN: 5.8 G/DL (ref 6.4–8.3)
WBC # BLD: 11.6 K/UL (ref 3.5–11.3)

## 2020-08-28 PROCEDURE — 2580000003 HC RX 258: Performed by: NURSE PRACTITIONER

## 2020-08-28 PROCEDURE — 83735 ASSAY OF MAGNESIUM: CPT

## 2020-08-28 PROCEDURE — 2709999900 IR BIOPSY KIDNEY PERCUTANEOUS

## 2020-08-28 PROCEDURE — 99152 MOD SED SAME PHYS/QHP 5/>YRS: CPT | Performed by: RADIOLOGY

## 2020-08-28 PROCEDURE — 99232 SBSQ HOSP IP/OBS MODERATE 35: CPT | Performed by: INTERNAL MEDICINE

## 2020-08-28 PROCEDURE — 71045 X-RAY EXAM CHEST 1 VIEW: CPT

## 2020-08-28 PROCEDURE — 88300 SURGICAL PATH GROSS: CPT

## 2020-08-28 PROCEDURE — 80051 ELECTROLYTE PANEL: CPT

## 2020-08-28 PROCEDURE — 50200 RENAL BIOPSY PERQ: CPT | Performed by: RADIOLOGY

## 2020-08-28 PROCEDURE — 6370000000 HC RX 637 (ALT 250 FOR IP): Performed by: NURSE PRACTITIONER

## 2020-08-28 PROCEDURE — 0TB13ZX EXCISION OF LEFT KIDNEY, PERCUTANEOUS APPROACH, DIAGNOSTIC: ICD-10-PCS | Performed by: RADIOLOGY

## 2020-08-28 PROCEDURE — 2500000003 HC RX 250 WO HCPCS: Performed by: INTERNAL MEDICINE

## 2020-08-28 PROCEDURE — 6360000002 HC RX W HCPCS: Performed by: NURSE PRACTITIONER

## 2020-08-28 PROCEDURE — 76942 ECHO GUIDE FOR BIOPSY: CPT | Performed by: RADIOLOGY

## 2020-08-28 PROCEDURE — 6370000000 HC RX 637 (ALT 250 FOR IP): Performed by: INTERNAL MEDICINE

## 2020-08-28 PROCEDURE — 99153 MOD SED SAME PHYS/QHP EA: CPT | Performed by: RADIOLOGY

## 2020-08-28 PROCEDURE — 85610 PROTHROMBIN TIME: CPT

## 2020-08-28 PROCEDURE — 2580000003 HC RX 258: Performed by: RADIOLOGY

## 2020-08-28 PROCEDURE — 85027 COMPLETE CBC AUTOMATED: CPT

## 2020-08-28 PROCEDURE — 80053 COMPREHEN METABOLIC PANEL: CPT

## 2020-08-28 PROCEDURE — 36415 COLL VENOUS BLD VENIPUNCTURE: CPT

## 2020-08-28 PROCEDURE — 2060000000 HC ICU INTERMEDIATE R&B

## 2020-08-28 PROCEDURE — 90935 HEMODIALYSIS ONE EVALUATION: CPT

## 2020-08-28 PROCEDURE — 2700000000 HC OXYGEN THERAPY PER DAY

## 2020-08-28 PROCEDURE — 2500000003 HC RX 250 WO HCPCS: Performed by: RADIOLOGY

## 2020-08-28 RX ORDER — ACETAMINOPHEN 325 MG/1
650 TABLET ORAL EVERY 4 HOURS PRN
Status: DISCONTINUED | OUTPATIENT
Start: 2020-08-28 | End: 2020-09-01 | Stop reason: HOSPADM

## 2020-08-28 RX ORDER — SODIUM CHLORIDE 9 MG/ML
INJECTION, SOLUTION INTRAVENOUS CONTINUOUS
Status: DISCONTINUED | OUTPATIENT
Start: 2020-08-28 | End: 2020-09-01 | Stop reason: HOSPADM

## 2020-08-28 RX ORDER — LABETALOL HYDROCHLORIDE 5 MG/ML
INJECTION, SOLUTION INTRAVENOUS
Status: COMPLETED | OUTPATIENT
Start: 2020-08-28 | End: 2020-08-28

## 2020-08-28 RX ADMIN — SODIUM CHLORIDE, PRESERVATIVE FREE 10 ML: 5 INJECTION INTRAVENOUS at 21:45

## 2020-08-28 RX ADMIN — LORAZEPAM 1 MG: 2 INJECTION, SOLUTION INTRAMUSCULAR; INTRAVENOUS at 00:13

## 2020-08-28 RX ADMIN — Medication 1.2 ML: at 21:22

## 2020-08-28 RX ADMIN — HYDROCODONE BITARTRATE AND ACETAMINOPHEN 1 TABLET: 5; 325 TABLET ORAL at 08:53

## 2020-08-28 RX ADMIN — SODIUM CHLORIDE: 9 INJECTION, SOLUTION INTRAVENOUS at 17:04

## 2020-08-28 RX ADMIN — CARVEDILOL 6.25 MG: 6.25 TABLET, FILM COATED ORAL at 08:51

## 2020-08-28 RX ADMIN — OLANZAPINE 10 MG: 5 TABLET, FILM COATED ORAL at 21:44

## 2020-08-28 RX ADMIN — CARVEDILOL 6.25 MG: 6.25 TABLET, FILM COATED ORAL at 17:03

## 2020-08-28 RX ADMIN — Medication 1 ML: at 21:22

## 2020-08-28 RX ADMIN — LABETALOL HYDROCHLORIDE 20 MG: 5 INJECTION, SOLUTION INTRAVENOUS at 14:45

## 2020-08-28 RX ADMIN — HEPARIN SODIUM 5000 UNITS: 5000 INJECTION INTRAVENOUS; SUBCUTANEOUS at 21:45

## 2020-08-28 RX ADMIN — LABETALOL HYDROCHLORIDE 10 MG: 5 INJECTION, SOLUTION INTRAVENOUS at 15:27

## 2020-08-28 RX ADMIN — LABETALOL HYDROCHLORIDE 10 MG: 5 INJECTION, SOLUTION INTRAVENOUS at 15:01

## 2020-08-28 RX ADMIN — Medication 100 MG: at 08:50

## 2020-08-28 RX ADMIN — SODIUM CHLORIDE, PRESERVATIVE FREE 10 ML: 5 INJECTION INTRAVENOUS at 08:51

## 2020-08-28 RX ADMIN — HYDROCODONE BITARTRATE AND ACETAMINOPHEN 1 TABLET: 5; 325 TABLET ORAL at 17:04

## 2020-08-28 ASSESSMENT — PAIN SCALES - GENERAL
PAINLEVEL_OUTOF10: 0
PAINLEVEL_OUTOF10: 9
PAINLEVEL_OUTOF10: 5

## 2020-08-28 NOTE — PROGRESS NOTES
[facility]  Office: [address], DO, Chai Forts, DO, Lorna Clamp, DO, Deny Brown Blood, DO, Christopher Knight MD, Prakash Rios MD, Maddie Barcenas MD, Maged Blake MD, Dl Koo MD, Rabia Pantoja MD, Elif Hernández MD, Makayla Power MD, Mbonu Monika Bagley MD, Taty Foster DO, Mallorie Hidalgo MD, Sanford Brooks MD, Tracy Cowart, DO, Patrick Mullins MD,  Sincere Craig DO, Paul Anguiano MD, Elbert Guzman MD, Anthony Goodrich, [facility], [facility], CNP, Kaley Bryan, CNP, Shira Marinelli, CNS, Matt Chatman, CNP, Edgardo Carbajal, CNP, Randee Gan, CNP, Manoj Quevedo, CNP, Cecy Sevilla, CNP, Shefali Vigil PA-C, Ferny Ferreira, [facility], Derrick Cuevas, CNP, Joyce Clark, CNP, Ma Kawasaki, CNP, Jennifer Cardenas, CNP, Sof[facility]    Progress Note    8/28/2020    9:14 AM    Name:   Anne Adkins  MRN:     [id number]     [place]:      [de-identified]   Room:   [address] [place] [address] Day:  4  Admit Date:  8/24/2020  9:18 AM    PCP:   No primary care provider on file. Code Status:  Full Code    Subjective:     C/C:   Chief Complaint   Patient presents with    Abdominal Pain    Emesis     x 3 days     Interval History Status: not changed.      No issues overnight  Still having generalized body pain  Had 3 sessions HD  Working on HD spot  Plans for renal biopsy today  No other complaints     Brief History:     Alexei Murphy a 50 y.o.  male who presents with Abdominal Pain and Emesis (x 3 days)and is admitted to the hospital for the management of Acute renal failure (ARF) Patient complains of 8 out of 10  right upper and lower quadrant abdominal pain, nausea and vomiting for the last 3 to 4 days. [facility] has had poor oral intake however has managed to keep some fluids down.  He tried taking Pepto-Bismol without relief.  He denies any fever, chills, recent sick contacts, chest pain, shortness of breath or diarrhea. [facility] does take NSAIDs occasionally for history of arthritis but states he does not take them daily and has not taken more than recommended. Leni Matthews does report minimal urine output.  He is a daily drinker, he drinks 6 beers per day but sometimes more. He denies any similar issues in the past and his previous creatinine has been within normal limits. he denies any drug use other than marijuana.  Initial ED evaluation revealed a creatinine of 12.2, BUN  83 sodium of 119, ALT 1101 and .     Review of Systems:     Constitutional:  negative for chills, fevers, sweats  Respiratory:  negative for cough, dyspnea on exertion, shortness of breath, wheezing  Cardiovascular:  negative for chest pain, chest pressure/discomfort  Gastrointestinal:  Negative for n/v, positive for abd pain   Neurological:  negative for dizziness, headache    Medications: Allergies: Allergies   Allergen Reactions    Codeine     Penicillins     Seasonal        Current Meds:   Scheduled Meds:    carvedilol  6.25 mg Oral BID WC    sodium chloride flush  10 mL Intravenous 2 times per day    heparin (porcine)  5,000 Units Subcutaneous 3 times per day    thiamine  100 mg Oral Daily    OLANZapine  10 mg Oral Nightly     Continuous Infusions:   PRN Meds: HYDROcodone 5 mg - acetaminophen, sodium citrate, sodium citrate, heparin (porcine), heparin (porcine), sodium chloride flush, acetaminophen **OR** acetaminophen, promethazine **OR** ondansetron, nicotine, LORazepam **OR** LORazepam **OR** LORazepam **OR** LORazepam **OR** LORazepam **OR** LORazepam **OR** LORazepam **OR** LORazepam    Data:     Past Medical History:   has a past medical history of Acute renal failure (ARF) ([facility] [address] Bipolar 1 disorder ([facility] [address] Depression, and Osteoarthritis of both knees. Social History:   reports that he has been smoking cigarettes. He has been smoking about 1.00 pack per day. He has never used smokeless tobacco. He reports current alcohol use.  He reports previous drug use. Drug: Marijuana. Family History:   Family History   Problem Relation Age of Onset    Heart Disease Father        Vitals:  BP (!) 140/75   Pulse 91   Temp 99.1 °F (37.3 °C) (Oral)   Resp 20   Ht 6' 2\" (1.88 m)   Wt 177 lb 14.4 oz (80.7 kg)   SpO2 90%   BMI 22.84 kg/m²   Temp (24hrs), Av.8 °F (37.1 °C), Min:98.2 °F (36.8 °C), Max:99.3 °F (37.4 °C)    No results for input(s): POCGLU in the last 72 hours. I/O (24Hr): Intake/Output Summary (Last 24 hours) at 2020 0914  Last data filed at 2020 0849  Gross per 24 hour   Intake 1340 ml   Output 1050 ml   Net 290 ml       Labs:  Hematology:  Recent Labs     20  0549 20  0617 20  0613   WBC 11.0 11.9* 11.6*   RBC 3.70* 3.43* 3.60*   HGB 12.2* 11.1* 11.8*   HCT 34.2* 32.8* 35.3*   MCV 92.4 95.6 98.1   MCH 33.0 32.4 32.8   MCHC 35.7* 33.8 33.4   RDW 12.1 12.8 13.0   * 146 186   MPV 10.6 11.0 10.6   INR 1.1 1.3 1.2     Chemistry:  Recent Labs     20  0549  20  0617  20  1711  20  2311 20  0155 20  0613 20  0730   *   < > 133*   < > 134*   < > 132* 133* 135 134*   K 4.2   < > 4.2   < > 4.3   < > 4.6 4.6 4.4 4.4   CL 86*   < > 98   < > 97*   < > 95* 96* 96* 97*   CO2 22   < > 22   < > 27   < > 24 24 26 24   GLUCOSE 108*   < > 152*   < > 101*  --  96  --  106*  --    BUN 65*   < > 41*   < > 22*  --  25*  --  30*  --    CREATININE 11.51*   < > 8.97*   < > 5.94*  --  6.56*  --  7.81*  --    MG 1.9  --  1.8  --   --   --   --   --  1.7  --    ANIONGAP 17   < > 13   < > 10   < > 13 13 13 13   LABGLOM 5*   < > 6*   < > 10*  --  9*  --  7*  --    GFRAA 6*   < > 8*   < > 12*  --  11*  --  9*  --    CALCIUM 8.0*   < > 8.2*   < > 8.2*  --  8.1*  --  8.4*  --    PROBNP  --   --  11,146*  --   --   --   --   --   --   --     < > = values in this interval not displayed.      Recent Labs     20  1711 20  2311 20  0613   PROT 5.7* 5.6* 5.8*   LABALBU 3.0* 2. 9* 3.0*   AST 20 19 16   * 224* 202*   ALKPHOS 54 52 53   BILITOT 0.86 0.70 0.81     ABG:  Lab Results   Component Value Date    POCPH 7.47 08/24/2020    POCPCO2 22 08/24/2020    POCPO2 78 08/24/2020    POCHCO3 15.9 08/24/2020    NBEA 8 08/24/2020    PBEA NOT REPORTED 08/24/2020    QZH7MIK 17 08/24/2020    AIXI2PEC 97 08/24/2020    FIO2 21.0 08/24/2020     No results found for: SPECIAL  No results found for: CULTURE    Radiology:  Ct Abdomen Pelvis Wo Contrast    Result Date: 8/25/2020  1. Limited by lack of IV contrast.  No focal inflammatory process identified in the abdomen or pelvis. 2.  Mild scattered nonspecific stranding in the upper mesenteric and omental fat. There is also a tiny amount of free fluid in the pelvis. These findings are nonspecific but could be related to enteritis. 3.  Mild diffuse urinary bladder wall thickening, likely due to incomplete distention. 4.  Multiple small lucencies noted in the bilateral iliac bones. These are nonspecific and could be related to heterogeneous bone mineralization. Other etiologies are not excluded. If the patient has a history of malignancy, consider bone scan for further evaluation. Xr Chest (single View Frontal)    Result Date: 8/25/2020  Satisfactory line placement, no pneumothorax Stable extensive right upper lung bullae Mild streaky bibasilar atelectasis     Xr Abdomen (kub) (single Ap View)    Result Date: 8/25/2020  No evidence of bowel obstruction. Us Renal Complete    Result Date: 8/24/2020  1. Echogenic kidneys, compatible with medical renal disease. 2. No hydronephrosis. Xr Chest Portable    Result Date: 8/26/2020  Increasing hazy opacifications of the lower lung zones suggest developing effusion. Emphysema. Ir Nontunneled Vascular Catheter > 5 Years    Result Date: 8/25/2020  Successful ultrasound and fluoroscopy guided non-tunneled hemodialysis catheter placement.        Physical Examination:        General appearance: alert, cooperative and no distress  Mental Status:  oriented to person, place and time and normal affect  Lungs:  clear to auscultation bilaterally, normal effort  Heart:  regular rate and rhythm  Abdomen:  soft, nontender, nondistended, normal bowel sounds  Extremities:  no edema, redness, tenderness in the calves  Skin:  no gross lesions, rashes, induration    Assessment:        Hospital Problems           Last Modified POA    * (Principal) Acute renal failure (ARF) ([address] 8/24/2020 Yes    Alcohol dependence ([address] 8/24/2020 Yes    Cannabis abuse 8/24/2020 Yes    Bipolar 1 disorder ([address] 8/24/2020 Yes    Tobacco use 8/24/2020 Yes    Schizoaffective disorder, depressive type ([address] 8/24/2020 Yes    Hyponatremia 8/24/2020 Yes    Elevated LFTs 8/24/2020 Yes    Thrombocytopenia ([address] 8/24/2020 Yes    Acute kidney injury ([address] 8/24/2020 Yes          Plan:        - Vitals, labs, imaging, medications reviewed  - Nephrology following - new start HD s/p 3 sessions - plans for biopsy today  - Continue to monitor renal function, UOP  - ECHO with normal EF  - DC CIWA no signs of withdrawal  - Pain control  - Working on OP HD spot  - Renal biopsy today  - Further HD per nephrology       Neri Fischer MD  8/28/2020  9:14 AM

## 2020-08-28 NOTE — PLAN OF CARE
Problem: Pain:  Goal: Pain level will decrease  Description: Pain level will decrease  8/28/2020 0146 by Selina Mcfadden RN  Outcome: Ongoing     Problem: Pain:  Goal: Control of acute pain  Description: Control of acute pain  8/28/2020 0146 by Selina Mcfadden RN  Outcome: Ongoing     Problem: Pain:  Goal: Control of chronic pain  Description: Control of chronic pain  8/28/2020 0146 by Selina Mcfadden RN  Outcome: Ongoing     Problem: Tissue Perfusion - Renal, Altered:  Goal: Electrolytes within specified parameters  Description: Electrolytes within specified parameters  8/28/2020 0146 by Selina Mcfadden RN  Outcome: Ongoing     Problem: Tissue Perfusion - Renal, Altered:  Goal: Urine creatinine clearance will be within specified parameters  Description: Urine creatinine clearance will be within specified parameters  8/28/2020 0146 by Selina Mcfadden RN  Outcome: Ongoing     Problem: Tissue Perfusion - Renal, Altered:  Goal: Serum creatinine will be within specified parameters  Description: Serum creatinine will be within specified parameters  8/28/2020 0146 by Selina Mcfadden RN  Outcome: Ongoing     Problem: Tissue Perfusion - Renal, Altered:  Goal: Ability to achieve a balanced intake and output will improve  Description: Ability to achieve a balanced intake and output will improve  8/28/2020 0146 by Selina Mcfadden RN  Outcome: Ongoing     Problem: Falls - Risk of:  Goal: Will remain free from falls  Description: Will remain free from falls  8/28/2020 0146 by Selina Mcfadden RN  Outcome: Ongoing     Problem: Falls - Risk of:  Goal: Absence of physical injury  Description: Absence of physical injury  8/28/2020 0146 by Selina Mcfadden RN  Outcome: Ongoing     Problem: Breathing Pattern - Ineffective:  Goal: Ability to achieve and maintain a regular respiratory rate will improve  Description: Ability to achieve and maintain a regular respiratory rate will improve  8/28/2020 0146 by Selina Mcfadden RN  Outcome: Ongoing Problem: Gas Exchange - Impaired:  Goal: Levels of oxygenation will improve  Description: Levels of oxygenation will improve  8/28/2020 0146 by Amina Ma RN  Outcome: Ongoing

## 2020-08-28 NOTE — BRIEF OP NOTE
Brief Postoperative Note    Nuvia Eugene  YOB: 1975  [id number]    Pre-operative Diagnosis: ARF    Post-operative Diagnosis: Same    Procedure: US guided random left renal biopsy    Anesthesia: Local    Surgeons/Assistants: Abilio    Estimated Blood Loss: less than 50     Complications: None    Specimens: Was Obtained: 4 core samples using 18 G needle    Findings: Small nonexpanding perinephric hematoma    Electronically signed by Jann Garcia MD on 8/28/2020 at 3:30 PM

## 2020-08-28 NOTE — PROGRESS NOTES
Patient back from kidney biopsy. Vitals stable. Patient's biopsy dressing site clean, dry and intact.

## 2020-08-28 NOTE — PROGRESS NOTES
Renal Progress Note    Patient :  Yolanda Khan; 50 y.o. MRN# [id number]  Location:  [id number]/4593-81  Attending:  Fauzia Ron MD  Admit Date:  8/24/2020   Hospital Day: 4      Subjective:     Has had 3 dialysis treatments so far, 8/25, 8/26 and 8/27/2020. Urine output fair although in positive balance. Has had intermittent respiratory difficulty, chest x-ray showed presence of pulmonary edema, has required Lasix and BiPAP placement. After his last dialysis treatment yesterday about 3 L of fluid was removed breathing has improved. Has been complaining of some abdominal pain. Patient is planned for kidney biopsy today. Clearances continue to be poor. Despite dialysis creatinine continues to be in the 7-8 range  Peripheral smear removed, no schistocytes seen, mild eosinophilia. LDH and haptoglobin unremarkable. Serological work-up including work-up for immune complex, pauci immune disease negative. C3 slightly low likely from liver disease. HIV 1 and 2, hepatitis B and C serologies negative, rheumatoid factor negative. Previous records and history reviewed. Creatinine was 0.9 in October 2019. He does take NSAIDs on a fairly regular basis. Takes it mostly for knee pain. Takes ibuprofen for breakthrough pain and meloxicam at least once or twice a week. Current symptoms started about 4 to 5 days ago initially with nausea and vomiting followed by abdominal pain. Also noticed decrease in urine output. Symptoms started after he was in a hotel with his friends for a few days. Tells me he was using recreational substances. He denies any fever or chills or mental status changes or confusion. Denies any tremors. Denies any skin rashes or pruritus. No history of venous thrombosis. Describes chronic knee pain although clinically does not appear to have significant arthritis. On presentation was found to have a creatinine 113, BUN of 90. Also had a sodium of 122.   Urine drug screen was positive for cocaine. Urinalysis which was a non-[place] specimen showed 10-20 RBCs and 2-5 WBCs. Also showed a urine protein creatinine ratio from 7. Ultrasound showed echogenic kidneys with kidney size around 12 cm on the left and 14 cm in the right. CT abdominal also showed nonspecific stranding in the upper mesenteric and omental fat. Labs also showed thrombocytopenia with a platelet count of 526. Hemoglobin was 11.8. Labs and ABG also showed respiratory alkalosis and anion gap metabolic acidosis and metabolic alkalosis. Rest of serologies pending. HIV 1 and 2-, hepatitis serologies negative. Liver enzymes showed high ALT at 1101 and . Outpatient Medications:     Medications Prior to Admission: OLANZapine (ZYPREXA) 10 MG tablet, Take 10 mg by mouth nightly   cyclobenzaprine (FLEXERIL) 10 MG tablet, Take 10 mg by mouth daily as needed for Muscle spasms  ibuprofen (ADVIL;MOTRIN) 800 MG tablet, Take 1 tablet by mouth every 8 hours as needed for Pain or Fever  meloxicam (MOBIC) 15 MG tablet, TAKE 1 TABLET BY MOUTH DAILY  [DISCONTINUED] MAPAP 500 MG tablet, TAKE 2 TABLETS BY MOUTH EVERY 6 HOURS AS NEEDED FOR PAIN  [DISCONTINUED] V-R CALAMINE LOTN, Apply 1 Act topically 2 times daily  [DISCONTINUED] sertraline (ZOLOFT) 50 MG tablet, Take 1 tablet by mouth daily.  (Patient taking differently: Take 50 mg by mouth 2 times daily )    Current Medications:     Scheduled Meds:    carvedilol  6.25 mg Oral BID WC    sodium chloride flush  10 mL Intravenous 2 times per day    heparin (porcine)  5,000 Units Subcutaneous 3 times per day    thiamine  100 mg Oral Daily    OLANZapine  10 mg Oral Nightly     Continuous Infusions:     PRN Meds:  HYDROcodone 5 mg - acetaminophen, sodium citrate, sodium citrate, heparin (porcine), heparin (porcine), sodium chloride flush, acetaminophen **OR** acetaminophen, promethazine **OR** ondansetron, nicotine, LORazepam **OR** LORazepam **OR** LORazepam **OR** LORazepam **OR** LORazepam **OR** LORazepam **OR** LORazepam **OR** LORazepam    Input/Output:       I/O last 3 completed shifts: In: 9538 [P.O.:1440; I.V.:10]  Out: 800 [Urine:800]. Patient Vitals for the past 96 hrs (Last 3 readings):   Weight   20 0600 177 lb 14.4 oz (80.7 kg)   20 1235 166 lb 3.6 oz (75.4 kg)   20 1742 172 lb 13.5 oz (78.4 kg)       Vital Signs:   Temperature:  Temp: 99.1 °F (37.3 °C)  TMax:   Temp (24hrs), Av.8 °F (37.1 °C), Min:98.2 °F (36.8 °C), Max:99.3 °F (37.4 °C)    Respirations:  Resp: 20  Pulse:   Pulse: 91  BP:    BP: (!) 140/75  BP Range: Systolic (87BOR), ZYY:158 , Min:108 , EVQ:117       Diastolic (99FXN), MWH:36, Min:64, Max:95      Physical Examination:     General:  AAO x 3, speaking in full sentences, no accessory muscle use. HEENT: Atraumatic, normocephalic, no throat congestion, moist mucosa. Eyes:   Pupils equal, round and reactive to light, EOMI. Neck:   No JVD, no thyromegaly, no lymphadenopathy. Chest:  Bilateral vesicular breath sounds, basal crackles  Cardiac:  S1 S2 RR, no murmurs, gallops or rubs, JVP not raised. Abdomen: Soft, non-tender, no masses or organomegaly, BS audible. :   No suprapubic or flank tenderness. Neuro:  AAO x 3, No FND. SKIN:  No rashes, good skin turgor. Extremities:  No edema, palpable peripheral pulses, no calf tenderness.   Peripheral pulses are palpable and normal  No asterixis  Labs:       Recent Labs     20  0549 20  0617 20  0613   WBC 11.0 11.9* 11.6*   RBC 3.70* 3.43* 3.60*   HGB 12.2* 11.1* 11.8*   HCT 34.2* 32.8* 35.3*   MCV 92.4 95.6 98.1   MCH 33.0 32.4 32.8   MCHC 35.7* 33.8 33.4   RDW 12.1 12.8 13.0   * 146 186   MPV 10.6 11.0 10.6      BMP:   Recent Labs     20  1711  20  2311 20  0155 20  0613 20  0730   *   < > 132* 133* 135 134*   K 4.3   < > 4.6 4.6 4.4 4.4   CL 97*   < > 95* 96* 96* 97*   CO2 27   < > 24 24 26 24   BUN 22*  --  25*  --  30*  -- CREATININE 5.94*  --  6.56*  --  7.81*  --    GLUCOSE 101*  --  96  --  106*  --    CALCIUM 8.2*  --  8.1*  --  8.4*  --     < > = values in this interval not displayed. Phosphorus:     No results for input(s): PHOS in the last 72 hours. Magnesium:    Recent Labs     08/26/20  0549 08/27/20  0617 08/28/20  0613   MG 1.9 1.8 1.7     Albumin:    Recent Labs     08/27/20  1711 08/27/20  2311 08/28/20  0613   LABALBU 3.0* 2.9* 3.0*     BNP:    No results found for: BNP  YULIA:      Lab Results   Component Value Date    YULIA NEGATIVE 08/24/2020     SPEP:  Lab Results   Component Value Date    PROT 5.8 08/28/2020    ALBCAL 3.4 08/24/2020    ALBPCT 64 08/24/2020    LABALPH 0.2 08/24/2020    LABALPH 0.5 08/24/2020    A1PCT 3 08/24/2020    A2PCT 9 08/24/2020    LABBETA 0.6 08/24/2020    BETAPCT 11 08/24/2020    GAMGLOB 0.7 08/24/2020    GGPCT 12 08/24/2020    PATH ELECTRONICALLY SIGNED.  Regina Temple M.D. 08/24/2020     UPEP:   No results found for: LABPE  C3:     Lab Results   Component Value Date    C3 70 08/24/2020     C4:     Lab Results   Component Value Date    C4 15 08/24/2020     MPO ANCA:     Lab Results   Component Value Date    MPO 5 08/24/2020     PR3 ANCA:     Lab Results   Component Value Date    PR3 9 08/24/2020     Anti-GBM:   No results found for: GBMABIGG  Hep BsAg:         Lab Results   Component Value Date    HEPBSAG NONREACTIVE 08/24/2020     Hep C AB:          Lab Results   Component Value Date    HEPCAB NONREACTIVE 08/24/2020       Urinalysis/Chemistries:      Lab Results   Component Value Date    NITRU NEGATIVE 08/25/2020    COLORU YELLOW 08/25/2020    PHUR 7.0 08/25/2020    WBCUA 5 TO 10 08/25/2020    RBCUA 10 TO 20 08/25/2020    MUCUS NOT REPORTED 08/25/2020    TRICHOMONAS NOT REPORTED 08/25/2020    YEAST NOT REPORTED 08/25/2020    BACTERIA NOT REPORTED 08/25/2020    SPECGRAV 1.010 08/25/2020    LEUKOCYTESUR NEGATIVE 08/25/2020    UROBILINOGEN Normal 08/25/2020    BILIRUBINUR NEGATIVE hydration, [place] catheter placement and serological test have been all negative. 4.  Increase oral intake  5. Above discussed with the patient and he is agreeable   6. Keep systolic pressure more than 110  7. We will arrange for outpatient dialysis part at the [facility] unit or [place]   8. Thank you for this interesting consult will follow with you  9. Reassess need for dialysis tomorrow    Nutrition   Please ensure that patient is on a renal diet/TF. Avoid nephrotoxic drugs/contrast exposure. We will continue to follow along with you.

## 2020-08-28 NOTE — PROGRESS NOTES
08/27/20 7918   NIV Type   $NIV $Daily Charge   Skin Assessment Clean, dry, & intact   Skin Protection for O2 Device No   Equipment ID #2   NIV Started/Stopped On   Equipment Type V60   Mode Bilevel   Mask Type Full face mask   Mask Size Medium   Settings/Measurements   IPAP 16 cmH20   CPAP/EPAP 10 cmH2O   Rate Ordered 12   Resp (!) 32   Insp Rise Time (%) 1 %   FiO2  40 %   I Time/ I Time % 0.9 s   Vt Exhaled 704 mL   Minute Volume 19.1 Liters   Mask Leak (lpm) 55 lpm   Comfort Level Good   Using Accessory Muscles No   SpO2 95   Breath Sounds   Right Upper Lobe Clear   Right Middle Lobe Diminished   Right Lower Lobe Clear   Left Upper Lobe Clear   Left Lower Lobe Diminished   Alarm Settings   Alarms On Y   Press Low Alarm 12 cmH2O   High Pressure Alarm 32 cmH2O   Apnea (secs) 20 secs   Resp Rate Low Alarm 8   High Respiratory Rate 40 br/min   placed pt on BIPAP for the night

## 2020-08-29 LAB
ANION GAP SERPL CALCULATED.3IONS-SCNC: 12 MMOL/L (ref 9–17)
BUN BLDV-MCNC: 24 MG/DL (ref 6–20)
BUN/CREAT BLD: 4 (ref 9–20)
CALCIUM SERPL-MCNC: 8.4 MG/DL (ref 8.6–10.4)
CHLORIDE BLD-SCNC: 97 MMOL/L (ref 98–107)
CO2: 24 MMOL/L (ref 20–31)
CREAT SERPL-MCNC: 6.07 MG/DL (ref 0.7–1.2)
GBM ANTIBODY IGG: 5 AU/ML
GFR AFRICAN AMERICAN: 12 ML/MIN
GFR NON-AFRICAN AMERICAN: 10 ML/MIN
GFR SERPL CREATININE-BSD FRML MDRD: ABNORMAL ML/MIN/{1.73_M2}
GFR SERPL CREATININE-BSD FRML MDRD: ABNORMAL ML/MIN/{1.73_M2}
GLUCOSE BLD-MCNC: 93 MG/DL (ref 70–99)
HCT VFR BLD CALC: 35.4 % (ref 40.7–50.3)
HEMOGLOBIN: 11.4 G/DL (ref 13–17)
INR BLD: 1.1
MAGNESIUM: 1.6 MG/DL (ref 1.6–2.6)
MCH RBC QN AUTO: 32.4 PG (ref 25.2–33.5)
MCHC RBC AUTO-ENTMCNC: 32.2 G/DL (ref 28.4–34.8)
MCV RBC AUTO: 100.6 FL (ref 82.6–102.9)
NRBC AUTOMATED: 0 PER 100 WBC
PDW BLD-RTO: 12.9 % (ref 11.8–14.4)
PLATELET # BLD: 220 K/UL (ref 138–453)
PMV BLD AUTO: 11.1 FL (ref 8.1–13.5)
POTASSIUM SERPL-SCNC: 5 MMOL/L (ref 3.7–5.3)
PROTHROMBIN TIME: 14.1 SEC (ref 11.5–14.2)
RBC # BLD: 3.52 M/UL (ref 4.21–5.77)
SODIUM BLD-SCNC: 133 MMOL/L (ref 135–144)
WBC # BLD: 11.9 K/UL (ref 3.5–11.3)

## 2020-08-29 PROCEDURE — 6360000002 HC RX W HCPCS: Performed by: PEDIATRICS

## 2020-08-29 PROCEDURE — 90935 HEMODIALYSIS ONE EVALUATION: CPT

## 2020-08-29 PROCEDURE — 6360000002 HC RX W HCPCS: Performed by: NURSE PRACTITIONER

## 2020-08-29 PROCEDURE — 85610 PROTHROMBIN TIME: CPT

## 2020-08-29 PROCEDURE — 83735 ASSAY OF MAGNESIUM: CPT

## 2020-08-29 PROCEDURE — 85027 COMPLETE CBC AUTOMATED: CPT

## 2020-08-29 PROCEDURE — 99232 SBSQ HOSP IP/OBS MODERATE 35: CPT | Performed by: INTERNAL MEDICINE

## 2020-08-29 PROCEDURE — 2060000000 HC ICU INTERMEDIATE R&B

## 2020-08-29 PROCEDURE — 6370000000 HC RX 637 (ALT 250 FOR IP): Performed by: NURSE PRACTITIONER

## 2020-08-29 PROCEDURE — 80048 BASIC METABOLIC PNL TOTAL CA: CPT

## 2020-08-29 PROCEDURE — 36415 COLL VENOUS BLD VENIPUNCTURE: CPT

## 2020-08-29 PROCEDURE — 2580000003 HC RX 258: Performed by: NURSE PRACTITIONER

## 2020-08-29 PROCEDURE — 6370000000 HC RX 637 (ALT 250 FOR IP): Performed by: INTERNAL MEDICINE

## 2020-08-29 RX ADMIN — SODIUM CHLORIDE, PRESERVATIVE FREE 10 ML: 5 INJECTION INTRAVENOUS at 20:46

## 2020-08-29 RX ADMIN — HEPARIN SODIUM 5000 UNITS: 5000 INJECTION INTRAVENOUS; SUBCUTANEOUS at 20:45

## 2020-08-29 RX ADMIN — HYDROCODONE BITARTRATE AND ACETAMINOPHEN 1 TABLET: 5; 325 TABLET ORAL at 06:04

## 2020-08-29 RX ADMIN — OLANZAPINE 10 MG: 5 TABLET, FILM COATED ORAL at 20:46

## 2020-08-29 RX ADMIN — CARVEDILOL 6.25 MG: 6.25 TABLET, FILM COATED ORAL at 16:36

## 2020-08-29 RX ADMIN — HEPARIN SODIUM 1200 UNITS: 1000 INJECTION INTRAVENOUS; SUBCUTANEOUS at 12:31

## 2020-08-29 RX ADMIN — HYDROCODONE BITARTRATE AND ACETAMINOPHEN 1 TABLET: 5; 325 TABLET ORAL at 16:36

## 2020-08-29 RX ADMIN — HEPARIN SODIUM 1000 UNITS: 1000 INJECTION INTRAVENOUS; SUBCUTANEOUS at 12:28

## 2020-08-29 RX ADMIN — CARVEDILOL 6.25 MG: 6.25 TABLET, FILM COATED ORAL at 08:18

## 2020-08-29 RX ADMIN — HEPARIN SODIUM 5000 UNITS: 5000 INJECTION INTRAVENOUS; SUBCUTANEOUS at 06:00

## 2020-08-29 RX ADMIN — Medication 100 MG: at 08:18

## 2020-08-29 ASSESSMENT — PAIN SCALES - GENERAL
PAINLEVEL_OUTOF10: 3
PAINLEVEL_OUTOF10: 8
PAINLEVEL_OUTOF10: 3
PAINLEVEL_OUTOF10: 8

## 2020-08-29 NOTE — PROGRESS NOTES
HEMODIALYSIS POST TREATMENT NOTE    Treatment time ordered: 120 minutes    Actual treatment time: 120 minutes    UltraFiltration Goal: 1000 ml  UltraFiltration Removed: 1500 ml      Pre Treatment weight: 78.7 KG  Post Treatment weight: 77.7 KG  Estimated Dry Weight    Access used:     Central Venous Catheter: rt neck non tunnel catheter          Access Flow: WNL     Sign and symptoms of infection: No       If YES    Medications or blood products given: No    Regular outpatient schedule: N/A    Summary of response to treatment: Tolerated well    Explain if orders NOT met, was physician notified:      ACES flowsheet faxed to patient unit/ placed in patient chart: NA Epic charting    Post assessment completed: Yes    Report given to: Marian Simon RN      * Intra-treatment documented Safety Checks include the followin) Access and face visible at all times. 2) All connections and blood lines are secure with no kinks. 3) NVL alarm engaged. 4) Hemosafe device applied (if applicable). 5) No collapse of Arterial or Venous blood chambers. 6) All blood lines / pump segments in the air detectors.

## 2020-08-29 NOTE — PROGRESS NOTES
HEMODIALYSIS PRE-TREATMENT NOTE    Patient Identifiers prior to treatment: Name and     Isolation Required: No                      Isolation Type: No       (please document if patient is being managed as a PUI/COVID-19 patient)        Hepatitis status:                           Date Drawn                             Result  Hepatitis B Surface Antigen 2020     Negative                     Hepatitis B Surface Antibody 2020 1000        Hepatitis B Core Antibody 2020 Negative          How was Hepatitis Status verified: EMR     Was a copy of the labs you documented provided to facility for the patient's chart: na    Hemodialysis orders verified: yes    Access Within normal limits ( I.e. s/s of infection,...): yes     Pre-Assessment completed: yes    Pre-dialysis report received from:  Rachael Schmitt                      Time: 4344

## 2020-08-29 NOTE — PLAN OF CARE
Problem: Pain:  Goal: Pain level will decrease  Description: Pain level will decrease  Outcome: Ongoing  Note: Monitoring pain with each assessment and prn. GENESIS 0-10 pain scale utilized. Non-pharmacological measures to be encouraged prior to pharmacological measures. Goal: Control of acute pain  Description: Control of acute pain  Outcome: Ongoing  Goal: Control of chronic pain  Description: Control of chronic pain  Outcome: Ongoing     Problem: Tissue Perfusion - Renal, Altered:  Goal: Electrolytes within specified parameters  Description: Electrolytes within specified parameters  Outcome: Ongoing  Note: Assess lab values. Replacement therapy provided as needed. Assessed respiratory status as needed. Goal: Urine creatinine clearance will be within specified parameters  Description: Urine creatinine clearance will be within specified parameters  Outcome: Ongoing  Note: Assess creatinine clearance parameters and/or serum creatinine. Goal: Serum creatinine will be within specified parameters  Description: Serum creatinine will be within specified parameters  Outcome: Ongoing  Goal: Ability to achieve a balanced intake and output will improve  Description: Ability to achieve a balanced intake and output will improve  Outcome: Ongoing  Note: Assessed for signs and symptoms of dehydration. Monitor intake and output every shift. Replace electrolytes as needed. Provided IV fluids per doctor and as needed. Problem: Falls - Risk of:  Goal: Will remain free from falls  Description: Will remain free from falls  Outcome: Ongoing  Note: Pt fall risk, fall band present, falling star, safety alarm activated and in use as needed. Hourly rounding performed. Pt encouraged to use call light. See [facility] [place] fall risk assessment.    Goal: Absence of physical injury  Description: Absence of physical injury  Outcome: Ongoing  Note: Non-skid socks in place, up with assistance, bed in lowest position, bed exit & alarm as needed, provide toileting every 2 hours an d as needed. Problem: Breathing Pattern - Ineffective:  Goal: Ability to achieve and maintain a regular respiratory rate will improve  Description: Ability to achieve and maintain a regular respiratory rate will improve  Outcome: Ongoing  Note: Assess for adventitious breath sounds. Monitored SaO2 > 90%. Applied 02 per nasal cannula as needed. Elevated HOB to improve breathing as needed. Problem: Gas Exchange - Impaired:  Goal: Levels of oxygenation will improve  Description: Levels of oxygenation will improve  Outcome: Ongoing  Note: Assess breath sounds every shift and as needed. Assess oxygenation level & respiration rate. Encourage coughing & deep breathing. Encourage use of incentive spirometer. Assess cough & sputum. Administer oxygen as needed.

## 2020-08-29 NOTE — PROGRESS NOTES
Renal Progress Note    Patient :  Heather Blanc; 50 y.o. MRN# [id number]  Location:  7081/[id number]  Attending:  Jeff Calixto MD  Admit Date:  8/24/2020   Hospital Day: 5      Subjective:     Has had 4 dialysis treatments so far, 8/25, 8/26, 8/27 and 8/29/2020. With dialysis fluid removal his breathing has improved. Has been complaining of some nausea today. S/P left kidney biopsy 8/28. No biopsy site pain. Peripheral smear showed no schistocytes, mild eosinophilia. LDH and haptoglobin unremarkable. Serological work-up including work-up for immune complex, pauci immune disease negative. C3 slightly low likely from liver disease. HIV 1 and 2, hepatitis B and C serologies negative, rheumatoid factor negative. Previous records and history reviewed. Creatinine was 0.9 in October 2019. He does take NSAIDs on a fairly regular basis. Takes it mostly for knee pain. Takes ibuprofen for breakthrough pain and meloxicam at least once or twice a week. Current symptoms started about 4 to 5 days ago initially with nausea and vomiting followed by abdominal pain. Also noticed decrease in urine output. Symptoms started after he was in a hotel with his friends for a few days. Tells me he was using recreational substances. He denies any fever or chills or mental status changes or confusion. Denies any tremors. Denies any skin rashes or pruritus. No history of venous thrombosis. Describes chronic knee pain although clinically does not appear to have significant arthritis. On presentation was found to have a creatinine 113, BUN of 90. Also had a sodium of 122. Urine drug screen was positive for cocaine. Urinalysis which was a non-[place] specimen showed 10-20 RBCs and 2-5 WBCs. Also showed a urine protein creatinine ratio from 7. Ultrasound showed echogenic kidneys with kidney size around 12 cm on the left and 14 cm in the right.   CT abdominal also showed nonspecific stranding in the upper mesenteric and omental fat.  Labs also showed thrombocytopenia with a platelet count of 646. Hemoglobin was 11.8. Labs and ABG also showed respiratory alkalosis and anion gap metabolic acidosis and metabolic alkalosis. Rest of serologies pending. HIV 1 and 2-, hepatitis serologies negative. Liver enzymes showed high ALT at 1101 and . Outpatient Medications:     Medications Prior to Admission: OLANZapine (ZYPREXA) 10 MG tablet, Take 10 mg by mouth nightly   cyclobenzaprine (FLEXERIL) 10 MG tablet, Take 10 mg by mouth daily as needed for Muscle spasms  ibuprofen (ADVIL;MOTRIN) 800 MG tablet, Take 1 tablet by mouth every 8 hours as needed for Pain or Fever  meloxicam (MOBIC) 15 MG tablet, TAKE 1 TABLET BY MOUTH DAILY  [DISCONTINUED] MAPAP 500 MG tablet, TAKE 2 TABLETS BY MOUTH EVERY 6 HOURS AS NEEDED FOR PAIN  [DISCONTINUED] V-R CALAMINE LOTN, Apply 1 Act topically 2 times daily  [DISCONTINUED] sertraline (ZOLOFT) 50 MG tablet, Take 1 tablet by mouth daily. (Patient taking differently: Take 50 mg by mouth 2 times daily )    Current Medications:     Scheduled Meds:    carvedilol  6.25 mg Oral BID WC    sodium chloride flush  10 mL Intravenous 2 times per day    heparin (porcine)  5,000 Units Subcutaneous 3 times per day    thiamine  100 mg Oral Daily    OLANZapine  10 mg Oral Nightly     Continuous Infusions:    sodium chloride 20 mL/hr at 08/28/20 1704     PRN Meds:  acetaminophen, HYDROcodone 5 mg - acetaminophen, sodium citrate, sodium citrate, heparin (porcine), heparin (porcine), sodium chloride flush, [DISCONTINUED] acetaminophen **OR** acetaminophen, promethazine **OR** ondansetron, nicotine    Input/Output:       I/O last 3 completed shifts:  In: -   Out: 5900 [Urine:900].       Patient Vitals for the past 96 hrs (Last 3 readings):   Weight   08/29/20 1019 173 lb 8 oz (78.7 kg)   08/29/20 0505 173 lb 8 oz (78.7 kg)   08/28/20 2135 170 lb 13.7 oz (77.5 kg)       Vital Signs:   Temperature:  Temp: 99.3 °F (37.4 °C)  TMax:   Temp (24hrs), Av.1 °F (37.3 °C), Min:98.1 °F (36.7 °C), Max:100.4 °F (38 °C)    Respirations:  Resp: 20  Pulse:   Pulse: 83  BP:    BP: 113/68  BP Range: Systolic (22NMS), WMM:944 , Min:113 , ZJE:225       Diastolic (84EGH), AHQ:12, Min:68, Max:98      Physical Examination:     General:  AAO x 3, speaking in full sentences, no accessory muscle use. HEENT: Atraumatic, normocephalic, no throat congestion, moist mucosa. Eyes:   Pupils equal, round and reactive to light, EOMI. Neck:   No JVD, no thyromegaly, no lymphadenopathy. Chest:  Bilateral vesicular breath sounds, basal crackles  Cardiac:  S1 S2 RR, no murmurs, gallops or rubs, JVP not raised. Abdomen: Soft, non-tender, no masses or organomegaly, BS audible. Biopsy site ok. :   No suprapubic or flank tenderness. Neuro:  AAO x 3, No FND. SKIN:  No rashes, good skin turgor. Extremities:  No edema, palpable peripheral pulses, no calf tenderness. Peripheral pulses are palpable and normal  No asterixis    Labs:       Recent Labs     20  0617 20  0613 20  0614   WBC 11.9* 11.6* 11.9*   RBC 3.43* 3.60* 3.52*   HGB 11.1* 11.8* 11.4*   HCT 32.8* 35.3* 35.4*   MCV 95.6 98.1 100.6   MCH 32.4 32.8 32.4   MCHC 33.8 33.4 32.2   RDW 12.8 13.0 12.9    186 220   MPV 11.0 10.6 11.1      BMP:   Recent Labs     20  0613 20  0730 20  1106 20  0614    134* 133* 133*   K 4.4 4.4 4.3 5.0   CL 96* 97* 96* 97*   CO2 26 24 25 24   BUN 30*  --  32* 24*   CREATININE 7.81*  --  7.82* 6.07*   GLUCOSE 106*  --  92 93   CALCIUM 8.4*  --  8.5* 8.4*      Phosphorus:     No results for input(s): PHOS in the last 72 hours.   Magnesium:    Recent Labs     20  0617 20  0613 20  0614   MG 1.8 1.7 1.6     Albumin:    Recent Labs     20  2311 20  0613 20  1106   LABALBU 2.9* 3.0* 2.9*     BNP:    No results found for: BNP  YULIA:      Lab Results   Component Value Date    YULIA NEGATIVE 08/24/2020     Urine Eosinophils:  No components found for: UEOS    Radiology:     Reviewed. Assessment:     1. Acute Kidney Injury: Suspected acute tubular necrosis from intravascular volume depletion from decreased intake, nausea vomiting, NSAID use. Other possibilities include acute interstitial nephritis from NSAIDs. Urine studies do indicate RBCs and WBCs in the urine. Possibility of underlying connective tissue disease/rapidly progressing GN less likely based on patient's presentation. Patient does have proteinuria about 7 g. Creatinine was 0.9 in October 2019, now up to 12, oligo anuric and persists. Clinically was showing signs of uremia including abdominal pain, nausea and vomiting, which have now improved significantly after hemodialysis was done twice. Renal function has not shown any significant improvement since admission. 2.  Nephrotic range proteinuria  3. Thrombocytopenia secondary to  chronic liver disease, improving  4.  Echogenic and somewhat large size kidneys warrant work-up for infiltrative diseases  5. Schizoaffective disorder  6. Substance abuse  7. Anemia  8. Hyperphosphatemia   9. Euvolemic hyponatremia secondary to beer protomania    Plan:   S/p hemodialysis today  S/p kidney biopsy 8/04   Keep systolic pressure more than 110  We will arrange for outpatient dialysis part at the [facility] unit or [place]   Follow up chemistries    Thank you for this interesting consult will follow with you      Nutrition   Please ensure that patient is on a renal diet. Avoid nephrotoxic drugs/contrast exposure. We will continue to follow along with you.

## 2020-08-29 NOTE — PROGRESS NOTES
HEMODIALYSIS POST TREATMENT NOTE    Treatment time ordered: 3 hours    Actual treatment time: 3 hours    UltraFiltration Goal: 3000  UltraFiltration Removed: 3000      Pre Treatment weight: 80.7kg   Post Treatment weight: 77.5kg   Estimated Dry Weight:     Access used:     Central Venous Catheter: Right neck non-tunneled     Internal Access:        Access Flow: Good     Sign and symptoms of infection: No       If YES:     Medications or blood products given: None    Regular outpatient schedule: ANGELY    Summary of response to treatment: Patient tolerated treatment well. No s/s of distress noted during treatment. Explain if orders NOT met, was physician notified:phil      ACES flowsheet faxed to patient unit/ placed in patient chart: yes    Post assessment completed: yes    Report given to: Thalia Harrell documented Safety Checks include the followin) Access and face visible at all times. 2) All connections and blood lines are secure with no kinks. 3) NVL alarm engaged. 4) Hemosafe device applied (if applicable). 5) No collapse of Arterial or Venous blood chambers. 6) All blood lines / pump segments in the air detectors.

## 2020-08-29 NOTE — PROGRESS NOTES
occasionally for history of arthritis but states he does not take them daily and has not taken more than recommended. Ulices Camp does report minimal urine output.  He is a daily drinker, he drinks 6 beers per day but sometimes more. He denies any similar issues in the past and his previous creatinine has been within normal limits. he denies any drug use other than marijuana.  Initial ED evaluation revealed a creatinine of 12.2, BUN  83 sodium of 119, ALT 1101 and .     Review of Systems:     Constitutional:  negative for chills, fevers, sweats  Respiratory:  negative for cough, dyspnea on exertion, shortness of breath, wheezing  Cardiovascular:  negative for chest pain, chest pressure/discomfort  Gastrointestinal:  Negative for n/v, positive for abd pain   Neurological:  negative for dizziness, headache    Medications: Allergies: Allergies   Allergen Reactions    Codeine     Penicillins     Seasonal        Current Meds:   Scheduled Meds:    carvedilol  6.25 mg Oral BID WC    sodium chloride flush  10 mL Intravenous 2 times per day    heparin (porcine)  5,000 Units Subcutaneous 3 times per day    thiamine  100 mg Oral Daily    OLANZapine  10 mg Oral Nightly     Continuous Infusions:    sodium chloride 20 mL/hr at 08/28/20 1704     PRN Meds: acetaminophen, HYDROcodone 5 mg - acetaminophen, sodium citrate, sodium citrate, heparin (porcine), heparin (porcine), sodium chloride flush, [DISCONTINUED] acetaminophen **OR** acetaminophen, promethazine **OR** ondansetron, nicotine    Data:     Past Medical History:   has a past medical history of Acute renal failure (ARF) ([facility] [address] Bipolar 1 disorder ([facility] [address] Depression, and Osteoarthritis of both knees. Social History:   reports that he has been smoking cigarettes. He has been smoking about 1.00 pack per day. He has never used smokeless tobacco. He reports current alcohol use. He reports previous drug use. Drug: Marijuana.      Family History:   Family History Problem Relation Age of Onset    Heart Disease Father        Vitals:  /69   Pulse 76   Temp 99.1 °F (37.3 °C) (Oral)   Resp 20   Ht 6' 2\" (1.88 m)   Wt 173 lb 8 oz (78.7 kg)   SpO2 94%   BMI 22.28 kg/m²   Temp (24hrs), Av.1 °F (37.3 °C), Min:98.1 °F (36.7 °C), Max:100.4 °F (38 °C)    No results for input(s): POCGLU in the last 72 hours. I/O (24Hr): Intake/Output Summary (Last 24 hours) at 2020 1020  Last data filed at 2020 0401  Gross per 24 hour   Intake --   Output 4400 ml   Net -4400 ml       Labs:  Hematology:  Recent Labs     20  0617 20  0613 20  0614   WBC 11.9* 11.6* 11.9*   RBC 3.43* 3.60* 3.52*   HGB 11.1* 11.8* 11.4*   HCT 32.8* 35.3* 35.4*   MCV 95.6 98.1 100.6   MCH 32.4 32.8 32.4   MCHC 33.8 33.4 32.2   RDW 12.8 13.0 12.9    186 220   MPV 11.0 10.6 11.1   INR 1.3 1.2 1.1     Chemistry:  Recent Labs     20  0617  20  0613 20  0730 20  1106 20  0614   *   < > 135 134* 133* 133*   K 4.2   < > 4.4 4.4 4.3 5.0   CL 98   < > 96* 97* 96* 97*   CO2 22   < > 26 24 25 24   GLUCOSE 152*   < > 106*  --  92 93   BUN 41*   < > 30*  --  32* 24*   CREATININE 8.97*   < > 7.81*  --  7.82* 6.07*   MG 1.8  --  1.7  --   --  1.6   ANIONGAP 13   < > 13 13 12 12   LABGLOM 6*   < > 7*  --  7* 10*   GFRAA 8*   < > 9*  --  9* 12*   CALCIUM 8.2*   < > 8.4*  --  8.5* 8.4*   PROBNP 11,146*  --   --   --   --   --     < > = values in this interval not displayed.      Recent Labs     20  2311 20  0613 20  1106   PROT 5.6* 5.8* 5.6*   LABALBU 2.9* 3.0* 2.9*   AST 19 16 17   * 202* 180*   ALKPHOS 52 53 46   BILITOT 0.70 0.81 0.85     ABG:  Lab Results   Component Value Date    POCPH 7.47 2020    POCPCO2 22 2020    POCPO2 78 2020    POCHCO3 15.9 2020    NBEA 8 2020    PBEA NOT REPORTED 2020    GCF5YAN 17 2020    FTBW1MYV 97 2020    FIO2 21.0 2020     No results found for: SPECIAL  No results found for: CULTURE    Radiology:  Ct Abdomen Pelvis Wo Contrast    Result Date: 8/25/2020  1. Limited by lack of IV contrast.  No focal inflammatory process identified in the abdomen or pelvis. 2.  Mild scattered nonspecific stranding in the upper mesenteric and omental fat. There is also a tiny amount of free fluid in the pelvis. These findings are nonspecific but could be related to enteritis. 3.  Mild diffuse urinary bladder wall thickening, likely due to incomplete distention. 4.  Multiple small lucencies noted in the bilateral iliac bones. These are nonspecific and could be related to heterogeneous bone mineralization. Other etiologies are not excluded. If the patient has a history of malignancy, consider bone scan for further evaluation. Xr Chest (single View Frontal)    Result Date: 8/25/2020  Satisfactory line placement, no pneumothorax Stable extensive right upper lung bullae Mild streaky bibasilar atelectasis     Xr Abdomen (kub) (single Ap View)    Result Date: 8/25/2020  No evidence of bowel obstruction. Us Renal Complete    Result Date: 8/24/2020  1. Echogenic kidneys, compatible with medical renal disease. 2. No hydronephrosis. Xr Chest Portable    Result Date: 8/26/2020  Increasing hazy opacifications of the lower lung zones suggest developing effusion. Emphysema. Ir Nontunneled Vascular Catheter > 5 Years    Result Date: 8/25/2020  Successful ultrasound and fluoroscopy guided non-tunneled hemodialysis catheter placement.        Physical Examination:        General appearance:  alert, cooperative and no distress  Mental Status:  oriented to person, place and time and normal affect  Lungs:  clear to auscultation bilaterally, normal effort  Heart:  regular rate and rhythm  Abdomen:  soft, nontender, nondistended, normal bowel sounds  Extremities:  no edema, redness, tenderness in the calves  Skin:  no gross lesions, rashes, induration    Assessment:

## 2020-08-29 NOTE — PLAN OF CARE
Ineffective:  Goal: Ability to achieve and maintain a regular respiratory rate will improve  Description: Ability to achieve and maintain a regular respiratory rate will improve  8/28/2020 2059 by Skyler Soto RN  Outcome: Ongoing  Note: Assess for adventitious breath sounds. Monitored SaO2 > 90%. Applied 02 per nasal cannula as needed. Elevated HOB to improve breathing as needed. 8/28/2020 2056 by Skyler Soto RN  Outcome: Ongoing  Note: Assess for adventitious breath sounds. Monitored SaO2 > 90%. Applied 02 per nasal cannula as needed. Elevated HOB to improve breathing as needed. Problem: Gas Exchange - Impaired:  Goal: Levels of oxygenation will improve  Description: Levels of oxygenation will improve  8/28/2020 2059 by Skyler Soto RN  Outcome: Ongoing  Note: Assess breath sounds every shift and as needed. Assess oxygenation level & respiration rate. Encourage coughing & deep breathing. Encourage use of incentive spirometer. Assess cough & sputum. Administer oxygen as needed. 8/28/2020 2056 by Skyler Soto RN  Outcome: Ongoing  Note: Assess breath sounds every shift and as needed. Assess oxygenation level & respiration rate. Encourage coughing & deep breathing. Encourage use of incentive spirometer. Assess cough & sputum. Administer oxygen as needed.

## 2020-08-29 NOTE — PROGRESS NOTES
HEMODIALYSIS PRE-TREATMENT NOTE    Patient Identifiers prior to treatment: , Name , MRN    Isolation Required: no                   Isolation Type: N/A       (please document if patient is being managed as a PUI/COVID-19 patient)        Hepatitis status:                           Date Drawn                             Result  Hepatitis B Surface Antigen 20     Neg                  Hepatitis B Surface Antibody 20 >1000        Hepatitis B Core Antibody 20 Neg          How was Hepatitis Status verified: Inpatient Labs     Was a copy of the labs you documented provided to facility for the patient's chart: NA    Hemodialysis orders verified:1000 2020    Access Within normal limits ( I.e. s/s of infection,...): patent, dressing dry and intact , no S/S of infection    Pre-Assessment completed: Yes    Pre-dialysis report received from:Radha Villarreal RN                 Time: 1000 AM 2020

## 2020-08-30 LAB
ANION GAP SERPL CALCULATED.3IONS-SCNC: 10 MMOL/L (ref 9–17)
BUN BLDV-MCNC: 26 MG/DL (ref 6–20)
BUN/CREAT BLD: 4 (ref 9–20)
CALCIUM SERPL-MCNC: 8.5 MG/DL (ref 8.6–10.4)
CHLORIDE BLD-SCNC: 99 MMOL/L (ref 98–107)
CO2: 26 MMOL/L (ref 20–31)
CREAT SERPL-MCNC: 6.19 MG/DL (ref 0.7–1.2)
GFR AFRICAN AMERICAN: 12 ML/MIN
GFR NON-AFRICAN AMERICAN: 10 ML/MIN
GFR SERPL CREATININE-BSD FRML MDRD: ABNORMAL ML/MIN/{1.73_M2}
GFR SERPL CREATININE-BSD FRML MDRD: ABNORMAL ML/MIN/{1.73_M2}
GLUCOSE BLD-MCNC: 111 MG/DL (ref 70–99)
HCT VFR BLD CALC: 35.3 % (ref 40.7–50.3)
HEMOGLOBIN: 11.7 G/DL (ref 13–17)
INR BLD: 1.1
MAGNESIUM: 1.7 MG/DL (ref 1.6–2.6)
MCH RBC QN AUTO: 32.8 PG (ref 25.2–33.5)
MCHC RBC AUTO-ENTMCNC: 33.1 G/DL (ref 28.4–34.8)
MCV RBC AUTO: 98.9 FL (ref 82.6–102.9)
NRBC AUTOMATED: 0 PER 100 WBC
PDW BLD-RTO: 12.7 % (ref 11.8–14.4)
PHOSPHORUS: 3.9 MG/DL (ref 2.5–4.5)
PLATELET # BLD: 260 K/UL (ref 138–453)
PMV BLD AUTO: 9.8 FL (ref 8.1–13.5)
POTASSIUM SERPL-SCNC: 4.2 MMOL/L (ref 3.7–5.3)
PROTHROMBIN TIME: 14.3 SEC (ref 11.5–14.2)
RBC # BLD: 3.57 M/UL (ref 4.21–5.77)
SODIUM BLD-SCNC: 135 MMOL/L (ref 135–144)
WBC # BLD: 11 K/UL (ref 3.5–11.3)

## 2020-08-30 PROCEDURE — 6370000000 HC RX 637 (ALT 250 FOR IP): Performed by: NURSE PRACTITIONER

## 2020-08-30 PROCEDURE — 36415 COLL VENOUS BLD VENIPUNCTURE: CPT

## 2020-08-30 PROCEDURE — 85610 PROTHROMBIN TIME: CPT

## 2020-08-30 PROCEDURE — 84100 ASSAY OF PHOSPHORUS: CPT

## 2020-08-30 PROCEDURE — 85027 COMPLETE CBC AUTOMATED: CPT

## 2020-08-30 PROCEDURE — 6370000000 HC RX 637 (ALT 250 FOR IP): Performed by: INTERNAL MEDICINE

## 2020-08-30 PROCEDURE — 2580000003 HC RX 258: Performed by: NURSE PRACTITIONER

## 2020-08-30 PROCEDURE — 2060000000 HC ICU INTERMEDIATE R&B

## 2020-08-30 PROCEDURE — 83735 ASSAY OF MAGNESIUM: CPT

## 2020-08-30 PROCEDURE — 6360000002 HC RX W HCPCS: Performed by: NURSE PRACTITIONER

## 2020-08-30 PROCEDURE — 99232 SBSQ HOSP IP/OBS MODERATE 35: CPT | Performed by: INTERNAL MEDICINE

## 2020-08-30 PROCEDURE — 80048 BASIC METABOLIC PNL TOTAL CA: CPT

## 2020-08-30 RX ADMIN — HYDROCODONE BITARTRATE AND ACETAMINOPHEN 1 TABLET: 5; 325 TABLET ORAL at 20:13

## 2020-08-30 RX ADMIN — HEPARIN SODIUM 5000 UNITS: 5000 INJECTION INTRAVENOUS; SUBCUTANEOUS at 20:14

## 2020-08-30 RX ADMIN — SODIUM CHLORIDE, PRESERVATIVE FREE 10 ML: 5 INJECTION INTRAVENOUS at 07:50

## 2020-08-30 RX ADMIN — SODIUM CHLORIDE, PRESERVATIVE FREE 10 ML: 5 INJECTION INTRAVENOUS at 20:14

## 2020-08-30 RX ADMIN — HYDROCODONE BITARTRATE AND ACETAMINOPHEN 1 TABLET: 5; 325 TABLET ORAL at 07:41

## 2020-08-30 RX ADMIN — OLANZAPINE 10 MG: 5 TABLET, FILM COATED ORAL at 20:14

## 2020-08-30 RX ADMIN — Medication 100 MG: at 07:41

## 2020-08-30 RX ADMIN — CARVEDILOL 6.25 MG: 6.25 TABLET, FILM COATED ORAL at 17:26

## 2020-08-30 RX ADMIN — CARVEDILOL 6.25 MG: 6.25 TABLET, FILM COATED ORAL at 07:41

## 2020-08-30 RX ADMIN — HEPARIN SODIUM 5000 UNITS: 5000 INJECTION INTRAVENOUS; SUBCUTANEOUS at 05:56

## 2020-08-30 ASSESSMENT — PAIN SCALES - GENERAL
PAINLEVEL_OUTOF10: 0
PAINLEVEL_OUTOF10: 0
PAINLEVEL_OUTOF10: 7
PAINLEVEL_OUTOF10: 6
PAINLEVEL_OUTOF10: 0
PAINLEVEL_OUTOF10: 7

## 2020-08-30 NOTE — PROGRESS NOTES
[facility]  Office: [address], DO, Darnell Kenneth, DO, Abdelrahman Desai, DO, Rekha Altamirano, DO, Reynold Lacey MD, Brooke Ron MD, Faye Lloyd MD, Ilya Garcia MD, Noel Baca MD, Larry Platt MD, Riky Kemp MD, Adam Roberts MD, Moi Cárdenas Asa, MD, Christopher Baptiste, DO, Beronica Ruelas MD, Candie Tillman MD, Britney Kennedy DO, Brittney Bhakta MD,  Martha Baumann DO, Christy Perez MD, Saulo Sainz MD, Usama Brandon [facility], [facility], CNP, Marcie Castillo, CNP, Jemma Black, CNS, Gwen Patricia, CNP, Thiago Cheney, CNP, Ruddy Anthony, CNP, Nathan Young, CNP, Elle Ramirez, CNP, Carleen Monge PA-C, Flavio Orta, [facility], Roosevelt Cannon, CNP, Syeda Liao, CNP, Jackie Valera, CNP, Rene Soliman, [facility], Bisi Gonzales, [facility]    Progress Note    8/30/2020    10:04 AM    Name:   Teresa Aquino  MRN:     [id number]     Acct:      [de-identified]   Room:   [facility] Day:  6  Admit Date:  8/24/2020  9:18 AM    PCP:   No primary care provider on file. Code Status:  Full Code    Subjective:     C/C:   Chief Complaint   Patient presents with    Abdominal Pain    Emesis     x 3 days     Interval History Status: not changed.      No issues overnight  Pain stable  Awaiting biopsy results  Awaiting op hd spot     Brief History:     Randata Cirri a 50 y.o.  male who presents with Abdominal Pain and Emesis (x 3 days)and is admitted to the hospital for the management of Acute renal failure (ARF) Patient complains of 8 out of 10  right upper and lower quadrant abdominal pain, nausea and vomiting for the last 3 to 4 days. Kaylene Mendiola has had poor oral intake however has managed to keep some fluids down.  He tried taking Pepto-Bismol without relief.  He denies any fever, chills, recent sick contacts, chest pain, shortness of breath or diarrhea.  He does take NSAIDs occasionally for history of arthritis but states he does not take them daily and has not taken more than recommended. [facility] does report minimal urine output.  He is a daily drinker, he drinks 6 beers per day but sometimes more. He denies any similar issues in the past and his previous creatinine has been within normal limits. he denies any drug use other than marijuana.  Initial ED evaluation revealed a creatinine of 12.2, BUN  83 sodium of 119, ALT 1101 and .     Review of Systems:     Constitutional:  negative for chills, fevers, sweats  Respiratory:  negative for cough, dyspnea on exertion, shortness of breath, wheezing  Cardiovascular:  negative for chest pain, chest pressure/discomfort  Gastrointestinal:  Negative for n/v, positive for abd pain   Neurological:  negative for dizziness, headache    Medications: Allergies: Allergies   Allergen Reactions    Codeine     Penicillins     Seasonal        Current Meds:   Scheduled Meds:    carvedilol  6.25 mg Oral BID WC    sodium chloride flush  10 mL Intravenous 2 times per day    heparin (porcine)  5,000 Units Subcutaneous 3 times per day    thiamine  100 mg Oral Daily    OLANZapine  10 mg Oral Nightly     Continuous Infusions:    sodium chloride 20 mL/hr at 08/28/20 1704     PRN Meds: acetaminophen, HYDROcodone 5 mg - acetaminophen, sodium citrate, sodium citrate, heparin (porcine), heparin (porcine), sodium chloride flush, [DISCONTINUED] acetaminophen **OR** acetaminophen, promethazine **OR** ondansetron, nicotine    Data:     Past Medical History:   has a past medical history of Acute renal failure (ARF) ([facility] [address] Bipolar 1 disorder ([facility] [address] Depression, and Osteoarthritis of both knees. Social History:   reports that he has been smoking cigarettes. He has been smoking about 1.00 pack per day. He has never used smokeless tobacco. He reports current alcohol use. He reports previous drug use. Drug: Marijuana.      Family History:   Family History   Problem Relation Age of Onset    Heart Disease Father        Vitals:  BP (!) 141/81   Pulse 66   Temp 97.7 °F (36.5 °C) (Oral)   Resp 20   Ht 6' 2\" (1.88 m)   Wt 173 lb 14.4 oz (78.9 kg)   SpO2 93%   BMI 22.33 kg/m²   Temp (24hrs), Av.6 °F (37 °C), Min:97.7 °F (36.5 °C), Max:99.3 °F (37.4 °C)    No results for input(s): POCGLU in the last 72 hours. I/O (24Hr): Intake/Output Summary (Last 24 hours) at 2020 1004  Last data filed at 2020 0558  Gross per 24 hour   Intake --   Output 2700 ml   Net -2700 ml       Labs:  Hematology:  Recent Labs     20  0620  0614 20  0533   WBC 11.6* 11.9* 11.0   RBC 3.60* 3.52* 3.57*   HGB 11.8* 11.4* 11.7*   HCT 35.3* 35.4* 35.3*   MCV 98.1 100.6 98.9   MCH 32.8 32.4 32.8   MCHC 33.4 32.2 33.1   RDW 13.0 12.9 12.7    220 260   MPV 10.6 11.1 9.8   INR 1.2 1.1 1.1     Chemistry:  Recent Labs     20  0613  20  1106 20  0614 20  0533      < > 133* 133* 135   K 4.4   < > 4.3 5.0 4.2   CL 96*   < > 96* 97* 99   CO2 26   < > 25 24 26   GLUCOSE 106*  --  92 93 111*   BUN 30*  --  32* 24* 26*   CREATININE 7.81*  --  7.82* 6.07* 6.19*   MG 1.7  --   --  1.6 1.7   ANIONGAP 13   < > 12 12 10   LABGLOM 7*  --  7* 10* 10*   GFRAA 9*  --  9* 12* 12*   CALCIUM 8.4*  --  8.5* 8.4* 8.5*   PHOS  --   --   --   --  3.9    < > = values in this interval not displayed. Recent Labs     20  2311 20  0613 20  1106   PROT 5.6* 5.8* 5.6*   LABALBU 2.9* 3.0* 2.9*   AST 19 16 17   * 202* 180*   ALKPHOS 52 53 46   BILITOT 0.70 0.81 0.85     ABG:  Lab Results   Component Value Date    POCPH 7.47 2020    POCPCO2 22 2020    POCPO2 78 2020    POCHCO3 15.9 2020    NBEA 8 2020    PBEA NOT REPORTED 2020    TLV5CMP 17 2020    GTLZ4CXS 97 2020    FIO2 21.0 2020     No results found for: SPECIAL  No results found for: CULTURE    Radiology:  Ct Abdomen Pelvis Wo Contrast    Result Date: 2020  1.   Limited by lack of IV contrast.  No focal inflammatory process identified in the abdomen or pelvis. 2.  Mild scattered nonspecific stranding in the upper mesenteric and omental fat. There is also a tiny amount of free fluid in the pelvis. These findings are nonspecific but could be related to enteritis. 3.  Mild diffuse urinary bladder wall thickening, likely due to incomplete distention. 4.  Multiple small lucencies noted in the bilateral iliac bones. These are nonspecific and could be related to heterogeneous bone mineralization. Other etiologies are not excluded. If the patient has a history of malignancy, consider bone scan for further evaluation. Xr Chest (single View Frontal)    Result Date: 8/25/2020  Satisfactory line placement, no pneumothorax Stable extensive right upper lung bullae Mild streaky bibasilar atelectasis     Xr Abdomen (kub) (single Ap View)    Result Date: 8/25/2020  No evidence of bowel obstruction. Us Renal Complete    Result Date: 8/24/2020  1. Echogenic kidneys, compatible with medical renal disease. 2. No hydronephrosis. Xr Chest Portable    Result Date: 8/26/2020  Increasing hazy opacifications of the lower lung zones suggest developing effusion. Emphysema. Ir Nontunneled Vascular Catheter > 5 Years    Result Date: 8/25/2020  Successful ultrasound and fluoroscopy guided non-tunneled hemodialysis catheter placement.        Physical Examination:        General appearance:  alert, cooperative and no distress  Mental Status:  oriented to person, place and time and normal affect  Lungs:  clear to auscultation bilaterally, normal effort  Heart:  regular rate and rhythm  Abdomen:  soft, nontender, nondistended, normal bowel sounds  Extremities:  no edema, redness, tenderness in the calves  Skin:  no gross lesions, rashes, induration    Assessment:        Hospital Problems           Last Modified POA    * (Principal) Acute renal failure (ARF) ([address] 8/24/2020 Yes    Alcohol dependence ([facility] [address] 8/24/2020 Yes    Cannabis abuse 8/24/2020 Yes    Bipolar 1 disorder ([facility] [address] 8/24/2020 Yes    Tobacco use 8/24/2020 Yes    Schizoaffective disorder, depressive type ([facility] [address] 8/24/2020 Yes    Hyponatremia 8/24/2020 Yes    Elevated LFTs 8/24/2020 Yes    Thrombocytopenia ([facility] [address] 8/24/2020 Yes    [facility] 8/24/2020 Yes          Plan:        - Vitals, labs, imaging, medications reviewed  - Nephrology following - new start HD - s/p renal biopsy   - Continue to monitor renal function, UOP  - ECHO with normal EF  - Pain control  - S/P biopsy - pathology pending  - Further HD per nephrology   - DC planning when cleared by nephrology  - Awaiting HD placement       Fauzia Ron MD  8/30/2020  10:04 AM

## 2020-08-30 NOTE — PLAN OF CARE
Problem: Pain:  Goal: Pain level will decrease  Description: Pain level will decrease  8/29/2020 2224 by Walter Fuller RN  Outcome: Ongoing  Note: Monitoring pain with each assessment and prn. GENESIS 0-10 pain scale utilized. Non-pharmacological measures to be encouraged prior to pharmacological measures. 8/29/2020 1430 by Althea Carpenter RN  Outcome: Ongoing  Goal: Control of acute pain  Description: Control of acute pain  Outcome: Ongoing  Goal: Control of chronic pain  Description: Control of chronic pain  Outcome: Ongoing     Problem: Tissue Perfusion - Renal, Altered:  Goal: Electrolytes within specified parameters  Description: Electrolytes within specified parameters  8/29/2020 2224 by Walter Fuller RN  Outcome: Ongoing  Note: Assess lab values. Replacement therapy provided as needed. Assessed respiratory status as needed. 8/29/2020 1430 by Althea Carpenter RN  Outcome: Ongoing  Goal: Urine creatinine clearance will be within specified parameters  Description: Urine creatinine clearance will be within specified parameters  Outcome: Ongoing  Note: Assess creatinine clearance parameters and/or serum creatinine. Goal: Serum creatinine will be within specified parameters  Description: Serum creatinine will be within specified parameters  Outcome: Ongoing  Note: Assessed renal function & creatinine clearance levels. Goal: Ability to achieve a balanced intake and output will improve  Description: Ability to achieve a balanced intake and output will improve  Outcome: Ongoing  Note: Assessed for signs and symptoms of dehydration. Monitor intake and output every shift. Replace electrolytes as needed. Provided IV fluids per doctor and as needed.       Problem: Falls - Risk of:  Goal: Will remain free from falls  Description: Will remain free from falls  8/29/2020 2224 by Walter Fuller RN  Outcome: Ongoing  Note: Pt fall risk, fall band present, falling star, safety alarm activated and

## 2020-08-30 NOTE — PROGRESS NOTES
Renal Progress Note    Patient :  Nikolay Gamez; 50 y.o. MRN# [id number]  Location:  4587/0208-85  Attending:  Hailee Deleon MD  Admit Date:  8/24/2020   Hospital Day: 6      Subjective:     Has had 4 dialysis treatments so far, 8/25, 8/26, 8/27 and 8/29/2020. With dialysis fluid removal his breathing has improved. His nausea is better today. S/P left kidney biopsy 8/28. No biopsy site pain. Peripheral smear showed no schistocytes, mild eosinophilia. LDH and haptoglobin unremarkable. Serological work-up including work-up for immune complex, pauci immune disease negative. C3 slightly low likely from liver disease. HIV 1 and 2, hepatitis B and C serologies negative, rheumatoid factor negative. Previous records and history reviewed. Creatinine was 0.9 in October 2019. He does take NSAIDs on a fairly regular basis. Takes it mostly for knee pain. Takes ibuprofen for breakthrough pain and meloxicam at least once or twice a week. Current symptoms started about 4 to 5 days ago initially with nausea and vomiting followed by abdominal pain. Also noticed decrease in urine output. Symptoms started after he was in a hotel with his friends for a few days. Tells me he was using recreational substances. He denies any fever or chills or mental status changes or confusion. Denies any tremors. Denies any skin rashes or pruritus. No history of venous thrombosis. Describes chronic knee pain although clinically does not appear to have significant arthritis. On presentation was found to have a creatinine 113, BUN of 90. Also had a sodium of 122. Urine drug screen was positive for cocaine. Urinalysis which was a non-[place] specimen showed 10-20 RBCs and 2-5 WBCs. Also showed a urine protein creatinine ratio from 7. Ultrasound showed echogenic kidneys with kidney size around 12 cm on the left and 14 cm in the right. CT abdominal also showed nonspecific stranding in the upper mesenteric and omental fat.   Labs also showed thrombocytopenia with a platelet count of 213. Hemoglobin was 11.8. Labs and ABG also showed respiratory alkalosis and anion gap metabolic acidosis and metabolic alkalosis. Rest of serologies pending. HIV 1 and 2-, hepatitis serologies negative. Liver enzymes showed high ALT at 1101 and . Outpatient Medications:     Medications Prior to Admission: OLANZapine (ZYPREXA) 10 MG tablet, Take 10 mg by mouth nightly   cyclobenzaprine (FLEXERIL) 10 MG tablet, Take 10 mg by mouth daily as needed for Muscle spasms  ibuprofen (ADVIL;MOTRIN) 800 MG tablet, Take 1 tablet by mouth every 8 hours as needed for Pain or Fever  meloxicam (MOBIC) 15 MG tablet, TAKE 1 TABLET BY MOUTH DAILY  [DISCONTINUED] MAPAP 500 MG tablet, TAKE 2 TABLETS BY MOUTH EVERY 6 HOURS AS NEEDED FOR PAIN  [DISCONTINUED] V-R CALAMINE LOTN, Apply 1 Act topically 2 times daily  [DISCONTINUED] sertraline (ZOLOFT) 50 MG tablet, Take 1 tablet by mouth daily. (Patient taking differently: Take 50 mg by mouth 2 times daily )    Current Medications:     Scheduled Meds:    carvedilol  6.25 mg Oral BID WC    sodium chloride flush  10 mL Intravenous 2 times per day    heparin (porcine)  5,000 Units Subcutaneous 3 times per day    thiamine  100 mg Oral Daily    OLANZapine  10 mg Oral Nightly     Continuous Infusions:    sodium chloride 20 mL/hr at 08/28/20 1704     PRN Meds:  acetaminophen, HYDROcodone 5 mg - acetaminophen, sodium citrate, sodium citrate, heparin (porcine), heparin (porcine), sodium chloride flush, [DISCONTINUED] acetaminophen **OR** acetaminophen, promethazine **OR** ondansetron, nicotine    Input/Output:       I/O last 3 completed shifts:  In: -   Out: 1200 [Urine:1200].       Patient Vitals for the past 96 hrs (Last 3 readings):   Weight   08/30/20 0554 173 lb 14.4 oz (78.9 kg)   08/29/20 1019 173 lb 8 oz (78.7 kg)   08/29/20 0505 173 lb 8 oz (78.7 kg)       Vital Signs:   Temperature:  Temp: 97.7 °F (36.5 °C)  TMax:   Temp (24hrs), Av.3 °F (36.8 °C), Min:97.7 °F (36.5 °C), Max:98.8 °F (37.1 °C)    Respirations:  Resp: 20  Pulse:   Pulse: 66  BP:    BP: (!) 141/81  BP Range: Systolic (60DXL), HZE:249 , Min:125 , ZET:659       Diastolic (07FRK), JHW:55, Min:74, Max:81      Physical Examination:     General:  AAO x 3, speaking in full sentences, no accessory muscle use. HEENT: Atraumatic, normocephalic, no throat congestion, moist mucosa. Eyes:   Pupils equal, round and reactive to light, EOMI. Neck:   No JVD, no thyromegaly, no lymphadenopathy. Chest:  Bilateral vesicular breath sounds, basal crackles  Cardiac:  S1 S2 RR, no murmurs, gallops or rubs, JVP not raised. Abdomen: Soft, non-tender, no masses or organomegaly, BS audible. Biopsy site ok. :   No suprapubic or flank tenderness. Neuro:  AAO x 3, No FND. SKIN:  No rashes, good skin turgor. Extremities:  No edema, palpable peripheral pulses, no calf tenderness.   Peripheral pulses are palpable and normal  No asterixis    Labs:       Recent Labs     20  0613 20  0614 20  0533   WBC 11.6* 11.9* 11.0   RBC 3.60* 3.52* 3.57*   HGB 11.8* 11.4* 11.7*   HCT 35.3* 35.4* 35.3*   MCV 98.1 100.6 98.9   MCH 32.8 32.4 32.8   MCHC 33.4 32.2 33.1   RDW 13.0 12.9 12.7    220 260   MPV 10.6 11.1 9.8      BMP:   Recent Labs     20  1106 20  0614 20  0533   * 133* 135   K 4.3 5.0 4.2   CL 96* 97* 99   CO2 25 24 26   BUN 32* 24* 26*   CREATININE 7.82* 6.07* 6.19*   GLUCOSE 92 93 111*   CALCIUM 8.5* 8.4* 8.5*      Phosphorus:     Recent Labs     20  0533   PHOS 3.9     Magnesium:    Recent Labs     20  0613 20  0614 20  0533   MG 1.7 1.6 1.7     Albumin:    Recent Labs     20  2311 20  0613 20  1106   LABALBU 2.9* 3.0* 2.9*     BNP:    No results found for: BNP  YULIA:      Lab Results   Component Value Date    YULIA NEGATIVE 2020     SPEP:  Lab Results   Component Value Date    PROT 5.6 2020 ALBCAL 3.4 08/24/2020    ALBPCT 64 08/24/2020    LABALPH 0.2 08/24/2020    LABALPH 0.5 08/24/2020    A1PCT 3 08/24/2020    A2PCT 9 08/24/2020    LABBETA 0.6 08/24/2020    BETAPCT 11 08/24/2020    GAMGLOB 0.7 08/24/2020    GGPCT 12 08/24/2020    PATH ELECTRONICALLY SIGNED.  Vinh Dietz M.D. 08/24/2020     UPEP:   No results found for: LABPE  C3:     Lab Results   Component Value Date    C3 70 08/24/2020     C4:     Lab Results   Component Value Date    C4 15 08/24/2020     MPO ANCA:     Lab Results   Component Value Date    MPO 5 08/24/2020     PR3 ANCA:     Lab Results   Component Value Date    PR3 9 08/24/2020     Anti-GBM:     Lab Results   Component Value Date    GBMABIGG 5 08/25/2020     Hep BsAg:         Lab Results   Component Value Date    HEPBSAG NONREACTIVE 08/24/2020     Hep C AB:          Lab Results   Component Value Date    HEPCAB NONREACTIVE 08/24/2020       Urinalysis/Chemistries:      Lab Results   Component Value Date    NITRU NEGATIVE 08/25/2020    COLORU YELLOW 08/25/2020    PHUR 7.0 08/25/2020    WBCUA 5 TO 10 08/25/2020    RBCUA 10 TO 20 08/25/2020    MUCUS NOT REPORTED 08/25/2020    TRICHOMONAS NOT REPORTED 08/25/2020    YEAST NOT REPORTED 08/25/2020    BACTERIA NOT REPORTED 08/25/2020    SPECGRAV 1.010 08/25/2020    LEUKOCYTESUR NEGATIVE 08/25/2020    UROBILINOGEN Normal 08/25/2020    BILIRUBINUR NEGATIVE 08/25/2020    GLUCOSEU NEGATIVE 08/25/2020    KETUA NEGATIVE 08/25/2020    AMORPHOUS NOT REPORTED 08/25/2020     Urine Sodium:     Lab Results   Component Value Date    ANDREA 43 08/24/2020     Urine Potassium:  No results found for: KUR  Urine Chloride:  No results found for: CLUR  Urine Osmolarity:   Lab Results   Component Value Date    OSMOU 209 08/24/2020     Urine Protein:   No components found for: TOTALPROTEIN, URINE   Urine Creatinine:     Lab Results   Component Value Date    LABCREA 63.1 08/24/2020     Urine Eosinophils:  No components found for: UEOS    Radiology: Reviewed. Assessment:     1. Acute Kidney Injury: Suspected acute tubular necrosis from intravascular volume depletion from decreased intake, nausea vomiting, NSAID use. Other possibilities include acute interstitial nephritis from NSAIDs. Urine studies do indicate RBCs and WBCs in the urine. Possibility of underlying connective tissue disease/rapidly progressing GN less likely based on patient's presentation. Patient does have proteinuria about 7 g. Creatinine was 0.9 in October 2019, now up to 12, oligo anuric and persists. Clinically was showing signs of uremia including abdominal pain, nausea and vomiting, which have now improved significantly after hemodialysis was done twice. Renal function has not shown any significant improvement since admission. 2.  Nephrotic range proteinuria  3. Thrombocytopenia secondary to  chronic liver disease, improving  4.  Echogenic and somewhat large size kidneys warrant work-up for infiltrative diseases  5. Schizoaffective disorder  6. Substance abuse  7. Anemia  8. Hyperphosphatemia   9. Euvolemic hyponatremia secondary to beer protomania    Plan:   S/p hemodialysis 8/29  S/p kidney biopsy 7/16   Keep systolic pressure more than 110  We will arrange for tunelled catheter and outpatient dialysis part at the [facility] or [place]   Follow up chemistries    Thank you for this interesting consult will follow with you      Nutrition   Please ensure that patient is on a renal diet. Avoid nephrotoxic drugs/contrast exposure. We will continue to follow along with you.

## 2020-08-31 ENCOUNTER — APPOINTMENT (OUTPATIENT)
Dept: INTERVENTIONAL RADIOLOGY/VASCULAR | Age: 45
DRG: 469 | End: 2020-08-31
Payer: MEDICAID

## 2020-08-31 LAB
ANION GAP SERPL CALCULATED.3IONS-SCNC: 14 MMOL/L (ref 9–17)
BUN BLDV-MCNC: 42 MG/DL (ref 6–20)
BUN/CREAT BLD: 6 (ref 9–20)
CALCIUM SERPL-MCNC: 8.6 MG/DL (ref 8.6–10.4)
CHLORIDE BLD-SCNC: 98 MMOL/L (ref 98–107)
CO2: 24 MMOL/L (ref 20–31)
CREAT SERPL-MCNC: 7.42 MG/DL (ref 0.7–1.2)
GFR AFRICAN AMERICAN: 10 ML/MIN
GFR NON-AFRICAN AMERICAN: 8 ML/MIN
GFR SERPL CREATININE-BSD FRML MDRD: ABNORMAL ML/MIN/{1.73_M2}
GFR SERPL CREATININE-BSD FRML MDRD: ABNORMAL ML/MIN/{1.73_M2}
GLUCOSE BLD-MCNC: 89 MG/DL (ref 70–99)
HCT VFR BLD CALC: 36.2 % (ref 40.7–50.3)
HEMOGLOBIN: 11.8 G/DL (ref 13–17)
INR BLD: 1
MAGNESIUM: 1.7 MG/DL (ref 1.6–2.6)
MCH RBC QN AUTO: 32.2 PG (ref 25.2–33.5)
MCHC RBC AUTO-ENTMCNC: 32.6 G/DL (ref 28.4–34.8)
MCV RBC AUTO: 98.6 FL (ref 82.6–102.9)
NRBC AUTOMATED: 0 PER 100 WBC
PDW BLD-RTO: 12.6 % (ref 11.8–14.4)
PHOSPHORUS: 6 MG/DL (ref 2.5–4.5)
PLATELET # BLD: 311 K/UL (ref 138–453)
PMV BLD AUTO: 9.8 FL (ref 8.1–13.5)
POTASSIUM SERPL-SCNC: 4.4 MMOL/L (ref 3.7–5.3)
PROTHROMBIN TIME: 13.2 SEC (ref 11.5–14.2)
PTH RELATED PEPTIDE: <2 PMOL/L (ref 0–2.3)
RBC # BLD: 3.67 M/UL (ref 4.21–5.77)
SODIUM BLD-SCNC: 136 MMOL/L (ref 135–144)
WBC # BLD: 11.4 K/UL (ref 3.5–11.3)

## 2020-08-31 PROCEDURE — 6360000002 HC RX W HCPCS: Performed by: RADIOLOGY

## 2020-08-31 PROCEDURE — 80048 BASIC METABOLIC PNL TOTAL CA: CPT

## 2020-08-31 PROCEDURE — 36415 COLL VENOUS BLD VENIPUNCTURE: CPT

## 2020-08-31 PROCEDURE — 0JH63XZ INSERTION OF TUNNELED VASCULAR ACCESS DEVICE INTO CHEST SUBCUTANEOUS TISSUE AND FASCIA, PERCUTANEOUS APPROACH: ICD-10-PCS | Performed by: RADIOLOGY

## 2020-08-31 PROCEDURE — 6370000000 HC RX 637 (ALT 250 FOR IP): Performed by: NURSE PRACTITIONER

## 2020-08-31 PROCEDURE — 6370000000 HC RX 637 (ALT 250 FOR IP): Performed by: INTERNAL MEDICINE

## 2020-08-31 PROCEDURE — 36558 INSERT TUNNELED CV CATH: CPT

## 2020-08-31 PROCEDURE — 85027 COMPLETE CBC AUTOMATED: CPT

## 2020-08-31 PROCEDURE — 76937 US GUIDE VASCULAR ACCESS: CPT

## 2020-08-31 PROCEDURE — 2060000000 HC ICU INTERMEDIATE R&B

## 2020-08-31 PROCEDURE — 2580000003 HC RX 258: Performed by: NURSE PRACTITIONER

## 2020-08-31 PROCEDURE — 84100 ASSAY OF PHOSPHORUS: CPT

## 2020-08-31 PROCEDURE — 6360000002 HC RX W HCPCS: Performed by: NURSE PRACTITIONER

## 2020-08-31 PROCEDURE — 85610 PROTHROMBIN TIME: CPT

## 2020-08-31 PROCEDURE — 36011 PLACE CATHETER IN VEIN: CPT

## 2020-08-31 PROCEDURE — 02H633Z INSERTION OF INFUSION DEVICE INTO RIGHT ATRIUM, PERCUTANEOUS APPROACH: ICD-10-PCS | Performed by: RADIOLOGY

## 2020-08-31 PROCEDURE — 99232 SBSQ HOSP IP/OBS MODERATE 35: CPT | Performed by: INTERNAL MEDICINE

## 2020-08-31 PROCEDURE — 2580000003 HC RX 258: Performed by: RADIOLOGY

## 2020-08-31 PROCEDURE — 83735 ASSAY OF MAGNESIUM: CPT

## 2020-08-31 PROCEDURE — 2709999900 IR TUNNELED CVC PLACE WO SQ PORT/PUMP > 5 YEARS

## 2020-08-31 PROCEDURE — 77001 FLUOROGUIDE FOR VEIN DEVICE: CPT

## 2020-08-31 RX ORDER — HYDROCODONE BITARTRATE AND ACETAMINOPHEN 5; 325 MG/1; MG/1
1 TABLET ORAL EVERY 6 HOURS PRN
Qty: 12 TABLET | Refills: 0 | Status: SHIPPED | OUTPATIENT
Start: 2020-08-31 | End: 2020-09-03

## 2020-08-31 RX ORDER — CARVEDILOL 6.25 MG/1
6.25 TABLET ORAL 2 TIMES DAILY WITH MEALS
Qty: 60 TABLET | Refills: 3 | Status: SHIPPED | OUTPATIENT
Start: 2020-08-31

## 2020-08-31 RX ORDER — HEPARIN SODIUM 1000 [USP'U]/ML
INJECTION, SOLUTION INTRAVENOUS; SUBCUTANEOUS
Status: COMPLETED | OUTPATIENT
Start: 2020-08-31 | End: 2020-08-31

## 2020-08-31 RX ORDER — MORPHINE SULFATE 2 MG/ML
INJECTION, SOLUTION INTRAMUSCULAR; INTRAVENOUS
Status: COMPLETED | OUTPATIENT
Start: 2020-08-31 | End: 2020-08-31

## 2020-08-31 RX ORDER — HYDRALAZINE HYDROCHLORIDE 20 MG/ML
10 INJECTION INTRAMUSCULAR; INTRAVENOUS EVERY 4 HOURS PRN
Status: DISCONTINUED | OUTPATIENT
Start: 2020-08-31 | End: 2020-09-01 | Stop reason: HOSPADM

## 2020-08-31 RX ADMIN — HEPARIN SODIUM 5000 UNITS: 5000 INJECTION INTRAVENOUS; SUBCUTANEOUS at 06:41

## 2020-08-31 RX ADMIN — Medication 100 MG: at 09:04

## 2020-08-31 RX ADMIN — HEPARIN SODIUM 1900 UNITS: 1000 INJECTION, SOLUTION INTRAVENOUS; SUBCUTANEOUS at 12:01

## 2020-08-31 RX ADMIN — CARVEDILOL 6.25 MG: 6.25 TABLET, FILM COATED ORAL at 17:52

## 2020-08-31 RX ADMIN — OLANZAPINE 10 MG: 5 TABLET, FILM COATED ORAL at 20:55

## 2020-08-31 RX ADMIN — SODIUM CHLORIDE, PRESERVATIVE FREE 10 ML: 5 INJECTION INTRAVENOUS at 20:56

## 2020-08-31 RX ADMIN — HYDROCODONE BITARTRATE AND ACETAMINOPHEN 1 TABLET: 5; 325 TABLET ORAL at 20:55

## 2020-08-31 RX ADMIN — CARVEDILOL 6.25 MG: 6.25 TABLET, FILM COATED ORAL at 09:04

## 2020-08-31 RX ADMIN — HYDROCODONE BITARTRATE AND ACETAMINOPHEN 1 TABLET: 5; 325 TABLET ORAL at 09:04

## 2020-08-31 RX ADMIN — SODIUM CHLORIDE, PRESERVATIVE FREE 10 ML: 5 INJECTION INTRAVENOUS at 09:05

## 2020-08-31 RX ADMIN — HEPARIN SODIUM 5000 UNITS: 5000 INJECTION INTRAVENOUS; SUBCUTANEOUS at 22:28

## 2020-08-31 RX ADMIN — CEFAZOLIN SODIUM 1 G: 1 INJECTION, POWDER, FOR SOLUTION INTRAMUSCULAR; INTRAVENOUS at 11:46

## 2020-08-31 RX ADMIN — HEPARIN SODIUM 5000 UNITS: 5000 INJECTION INTRAVENOUS; SUBCUTANEOUS at 14:46

## 2020-08-31 RX ADMIN — MORPHINE SULFATE 1 MG: 2 INJECTION, SOLUTION INTRAMUSCULAR; INTRAVENOUS at 11:53

## 2020-08-31 RX ADMIN — HYDROCODONE BITARTRATE AND ACETAMINOPHEN 1 TABLET: 5; 325 TABLET ORAL at 14:46

## 2020-08-31 ASSESSMENT — PAIN SCALES - GENERAL
PAINLEVEL_OUTOF10: 7
PAINLEVEL_OUTOF10: 0
PAINLEVEL_OUTOF10: 7
PAINLEVEL_OUTOF10: 0
PAINLEVEL_OUTOF10: 7
PAINLEVEL_OUTOF10: 4
PAINLEVEL_OUTOF10: 0
PAINLEVEL_OUTOF10: 7

## 2020-08-31 ASSESSMENT — PAIN DESCRIPTION - ORIENTATION
ORIENTATION: RIGHT;UPPER
ORIENTATION: RIGHT;LOWER

## 2020-08-31 ASSESSMENT — PAIN DESCRIPTION - LOCATION
LOCATION: CHEST
LOCATION: ABDOMEN

## 2020-08-31 ASSESSMENT — PAIN DESCRIPTION - DESCRIPTORS
DESCRIPTORS: ACHING
DESCRIPTORS: THROBBING

## 2020-08-31 ASSESSMENT — PAIN DESCRIPTION - FREQUENCY: FREQUENCY: CONTINUOUS

## 2020-08-31 ASSESSMENT — PAIN DESCRIPTION - PAIN TYPE
TYPE: SURGICAL PAIN;ACUTE PAIN
TYPE: ACUTE PAIN

## 2020-08-31 ASSESSMENT — PAIN DESCRIPTION - ONSET: ONSET: ON-GOING

## 2020-08-31 NOTE — OP NOTE
Brief Postoperative Note    Hawk Metcalf  YOB: 1975  [id number]    Pre-operative Diagnosis: Renal insufficiency    Post-operative Diagnosis: Same    Procedure: Tunneled HD catheter placement, removal of temp. Anesthesia: Local    Surgeons/Assistants: Valri Homans, MD    Estimated Blood Loss: less than 50     Complications: None    Specimens: Was Not Obtained    Findings: Temp removed. New stick for tunneled HD catheter. Tolerated well. Ok to use.     Electronically signed by Amirah Avery MD on 8/31/2020 at 12:25 PM

## 2020-08-31 NOTE — PROGRESS NOTES
wheezing  Cardiovascular:  negative for chest pain, chest pressure/discomfort  Gastrointestinal:  Negative for n/v, positive for abd pain   Neurological:  negative for dizziness, headache    Medications: Allergies: Allergies   Allergen Reactions    Codeine     Penicillins     Seasonal        Current Meds:   Scheduled Meds:    carvedilol  6.25 mg Oral BID WC    sodium chloride flush  10 mL Intravenous 2 times per day    heparin (porcine)  5,000 Units Subcutaneous 3 times per day    thiamine  100 mg Oral Daily    OLANZapine  10 mg Oral Nightly     Continuous Infusions:    sodium chloride 20 mL/hr at 20 1704     PRN Meds: acetaminophen, HYDROcodone 5 mg - acetaminophen, sodium citrate, sodium citrate, heparin (porcine), heparin (porcine), sodium chloride flush, [DISCONTINUED] acetaminophen **OR** acetaminophen, promethazine **OR** ondansetron, nicotine    Data:     Past Medical History:   has a past medical history of Acute renal failure (ARF) ([facility] [address] Bipolar 1 disorder ([facility] [address] Depression, and Osteoarthritis of both knees. Social History:   reports that he has been smoking cigarettes. He has been smoking about 1.00 pack per day. He has never used smokeless tobacco. He reports current alcohol use. He reports previous drug use. Drug: Marijuana. Family History:   Family History   Problem Relation Age of Onset    Heart Disease Father        Vitals:  BP (!) 158/88   Pulse 62   Temp 98.4 °F (36.9 °C) (Oral)   Resp 18   Ht 6' 2\" (1.88 m)   Wt 173 lb 14.4 oz (78.9 kg)   SpO2 97%   BMI 22.33 kg/m²   Temp (24hrs), Av.9 °F (36.6 °C), Min:97.5 °F (36.4 °C), Max:98.4 °F (36.9 °C)    No results for input(s): POCGLU in the last 72 hours. I/O (24Hr):     Intake/Output Summary (Last 24 hours) at 2020 0916  Last data filed at 2020 0618  Gross per 24 hour   Intake 250 ml   Output 1500 ml   Net -1250 ml       Labs:  Hematology:  Recent Labs     20  1870 20  0533 20  0541 WBC 11.9* 11.0 11.4*   RBC 3.52* 3.57* 3.67*   HGB 11.4* 11.7* 11.8*   HCT 35.4* 35.3* 36.2*   .6 98.9 98.6   MCH 32.4 32.8 32.2   MCHC 32.2 33.1 32.6   RDW 12.9 12.7 12.6    260 311   MPV 11.1 9.8 9.8   INR 1.1 1.1 1.0     Chemistry:  Recent Labs     08/29/20  0614 08/30/20  0533 08/31/20  0541   * 135 136   K 5.0 4.2 4.4   CL 97* 99 98   CO2 24 26 24   GLUCOSE 93 111* 89   BUN 24* 26* 42*   CREATININE 6.07* 6.19* 7.42*   MG 1.6 1.7 1.7   ANIONGAP 12 10 14   LABGLOM 10* 10* 8*   GFRAA 12* 12* 10*   CALCIUM 8.4* 8.5* 8.6   PHOS  --  3.9 6.0*     Recent Labs     08/28/20  1106   PROT 5.6*   LABALBU 2.9*   AST 17   *   ALKPHOS 46   BILITOT 0.85     ABG:  Lab Results   Component Value Date    POCPH 7.47 08/24/2020    POCPCO2 22 08/24/2020    POCPO2 78 08/24/2020    POCHCO3 15.9 08/24/2020    NBEA 8 08/24/2020    PBEA NOT REPORTED 08/24/2020    XKF2LQB 17 08/24/2020    EPCT5QFX 97 08/24/2020    FIO2 21.0 08/24/2020     No results found for: SPECIAL  No results found for: CULTURE    Radiology:  Ct Abdomen Pelvis Wo Contrast    Result Date: 8/25/2020  1. Limited by lack of IV contrast.  No focal inflammatory process identified in the abdomen or pelvis. 2.  Mild scattered nonspecific stranding in the upper mesenteric and omental fat. There is also a tiny amount of free fluid in the pelvis. These findings are nonspecific but could be related to enteritis. 3.  Mild diffuse urinary bladder wall thickening, likely due to incomplete distention. 4.  Multiple small lucencies noted in the bilateral iliac bones. These are nonspecific and could be related to heterogeneous bone mineralization. Other etiologies are not excluded. If the patient has a history of malignancy, consider bone scan for further evaluation.      Xr Chest (single View Frontal)    Result Date: 8/25/2020  Satisfactory line placement, no pneumothorax Stable extensive right upper lung bullae Mild streaky bibasilar atelectasis     Xr Abdomen (kub) (single Ap View)    Result Date: 8/25/2020  No evidence of bowel obstruction. Us Renal Complete    Result Date: 8/24/2020  1. Echogenic kidneys, compatible with medical renal disease. 2. No hydronephrosis. Xr Chest Portable    Result Date: 8/26/2020  Increasing hazy opacifications of the lower lung zones suggest developing effusion. Emphysema. Ir Nontunneled Vascular Catheter > 5 Years    Result Date: 8/25/2020  Successful ultrasound and fluoroscopy guided non-tunneled hemodialysis catheter placement.        Physical Examination:        General appearance:  alert, cooperative and no distress  Mental Status:  oriented to person, place and time and normal affect  Lungs:  clear to auscultation bilaterally, normal effort  Heart:  regular rate and rhythm  Abdomen:  soft, nontender, nondistended, normal bowel sounds  Extremities:  no edema, redness, tenderness in the calves  Skin:  no gross lesions, rashes, induration    Assessment:        Hospital Problems           Last Modified POA    * (Principal) Acute renal failure (ARF) ([address] 8/24/2020 Yes    Alcohol dependence ([address] 8/24/2020 Yes    Cannabis abuse 8/24/2020 Yes    Bipolar 1 disorder ([address] 8/24/2020 Yes    Tobacco use 8/24/2020 Yes    Schizoaffective disorder, depressive type ([address] 8/24/2020 Yes    Hyponatremia 8/24/2020 Yes    Elevated LFTs 8/24/2020 Yes    Thrombocytopenia ([address] 8/24/2020 Yes    Acute kidney injury ([address] 8/24/2020 Yes          Plan:        - Vitals, labs, imaging, medications reviewed  - Nephrology following - new start HD - s/p renal biopsy   - Continue to monitor renal function, UOP  - ECHO with normal EF  - Pain control  - S/P biopsy - pathology pending  - Further HD per nephrology   - Tunneled cath today  - DC planning - awaiting OP HD spot  - Next HD tomorrow - inpatient vs OP spot if set up  - Discussed with Dr Joe Aviles MD  8/31/2020  9:16 AM

## 2020-08-31 NOTE — PROGRESS NOTES
Nutrition Assessment     Type and Reason for Visit: RD Nutrition Re-Screen/LOS    Nutrition Recommendations/Plan:   1. Continue Renal diet    Nutrition Assessment:  Patient assessed for length of stay - pt. admitted with acute renal failure. Currently on Renal diet with % intake; weight is stable; no reports of wounds or nutritional alterations. Patient will have increased nutrient needs if remaining on HD. Will monitor nutrition status and reassess per protocol. Malnutrition Assessment:  Malnutrition Status:  None    Estimated Daily Nutrient Needs:  Energy (kcal): 3995-4888; Weight Used for Energy Requirements:  Admission     Protein (g): 95 g (1.2 g/kg); Weight Used for Protein Requirements:  Admission        Fluid (ml/day): Per MD; Weight Used for Fluid Requirements:  Admission      Nutrition Related Findings: BUN 42, Creat 7.42, GFR 8      Current Nutrition Therapies:    DIET RENAL; Anthropometric Measures:  · Height: 6' 2\" (188 cm)  · Current Body Wt: 173 lb 15.1 oz (78.9 kg)   · BMI: 22.3    Nutrition Diagnosis:   No nutrition diagnosis at this time     Nutrition Interventions:   Food and/or Nutrient Delivery:  Continue Current Diet  Nutrition Education/Counseling:  Education not indicated   Coordination of Nutrition Care:  Continued Inpatient Monitoring    Goals:   None at this time    Nutrition Monitoring and Evaluation:   Food/Nutrient Intake Outcomes:  Food and Nutrient Intake  Physical Signs/Symptoms Outcomes:  Skin, Biochemical Data     Discharge Planning:    Continue current diet     Some areas of assessment may be incomplete due to COVID-19 precautions.     Sydney Chakraborty RDN, [facility]  RD Office Phone: [phone]

## 2020-08-31 NOTE — PROGRESS NOTES
Nephrology Progress Note        Subjective: Status post native kidney biopsy on 2020. Patient is seen and examined. Not oliguric. Feels tired after hemodialysis on , ,  and 2020. He needs a tunneled catheter placed today. Awaiting biopsy results, serologies are negative thus far.     Objective:  CURRENT TEMPERATURE:  Temp: 98.4 °F (36.9 °C)  MAXIMUM TEMPERATURE OVER 24HRS:  Temp (24hrs), Av.9 °F (36.6 °C), Min:97.5 °F (36.4 °C), Max:98.4 °F (36.9 °C)    CURRENT RESPIRATORY RATE:  Resp: 18  CURRENT PULSE:  Pulse: 62  CURRENT BLOOD PRESSURE:  BP: (!) 158/88  24HR BLOOD PRESSURE RANGE:  Systolic (92OND), YJX:437 , Min:129 , FSY:373   ; Diastolic (06EFP), FXA:16, Min:75, Max:88    24HR INTAKE/OUTPUT:      Intake/Output Summary (Last 24 hours) at 2020 0844  Last data filed at 2020 4300  Gross per 24 hour   Intake 250 ml   Output 1500 ml   Net -1250 ml     Weight:      Physical Exam:  General appearance:Awake, alert, in no acute distress  Skin: warm and dry, on 2 liters oxygen by nasal cannula  Eyes: conjunctivae pale and sclera anicteric  ENT:no thrush, moist mucus membranes  Neck: no JVD, midline trachea, no accessory muscle use   Pulmonary: [de-identified]good bilateral air entry and clear bilaterally without wheezes or rales or rhonchi  Cardiovascular: Regular rate and rhythm with a positive S1 and S2 and no rubs  Abdomen: mildly distended, soft and not tender with active bowel sounds  Extremities: no pretibial or pedal edema    Access:  Temporary right neck catheter    Current Medications:    0.9 % sodium chloride infusion, Continuous  acetaminophen (TYLENOL) tablet 650 mg, Q4H PRN  HYDROcodone-acetaminophen (NORCO) 5-325 MG per tablet 1 tablet, Q6H PRN  carvedilol (COREG) tablet 6.25 mg, BID WC  sodium citrate 4 % injection 1 mL, PRN  sodium citrate 4 % injection 1.2 mL, PRN  heparin (porcine) injection 1,000 Units, PRN  heparin (porcine) injection 1,200 Units, PRN  sodium chloride flush Behzad Fleming MD on 8/31/2020 at 8:44 AM

## 2020-08-31 NOTE — FLOWSHEET NOTE
Dr Gabby Andersen notified that patient's most recent blood pressure is elevated at 166/98, orders noted, rechecked prior to administration of medication and does not meet criteria at this time

## 2020-09-01 VITALS
RESPIRATION RATE: 18 BRPM | TEMPERATURE: 98.8 F | DIASTOLIC BLOOD PRESSURE: 101 MMHG | SYSTOLIC BLOOD PRESSURE: 164 MMHG | WEIGHT: 175.93 LBS | HEIGHT: 74 IN | BODY MASS INDEX: 22.58 KG/M2 | OXYGEN SATURATION: 94 % | HEART RATE: 61 BPM

## 2020-09-01 LAB
ANION GAP SERPL CALCULATED.3IONS-SCNC: 14 MMOL/L (ref 9–17)
BUN BLDV-MCNC: 53 MG/DL (ref 6–20)
BUN/CREAT BLD: 7 (ref 9–20)
CALCIUM SERPL-MCNC: 8.8 MG/DL (ref 8.6–10.4)
CHLORIDE BLD-SCNC: 99 MMOL/L (ref 98–107)
CO2: 23 MMOL/L (ref 20–31)
CREAT SERPL-MCNC: 7.68 MG/DL (ref 0.7–1.2)
GFR AFRICAN AMERICAN: 9 ML/MIN
GFR NON-AFRICAN AMERICAN: 8 ML/MIN
GFR SERPL CREATININE-BSD FRML MDRD: ABNORMAL ML/MIN/{1.73_M2}
GFR SERPL CREATININE-BSD FRML MDRD: ABNORMAL ML/MIN/{1.73_M2}
GLUCOSE BLD-MCNC: 103 MG/DL (ref 70–99)
MAGNESIUM: 1.6 MG/DL (ref 1.6–2.6)
PHOSPHORUS: 6.4 MG/DL (ref 2.5–4.5)
POTASSIUM SERPL-SCNC: 4.2 MMOL/L (ref 3.7–5.3)
SODIUM BLD-SCNC: 136 MMOL/L (ref 135–144)
SURGICAL PATHOLOGY REPORT: NORMAL

## 2020-09-01 PROCEDURE — 84100 ASSAY OF PHOSPHORUS: CPT

## 2020-09-01 PROCEDURE — 36415 COLL VENOUS BLD VENIPUNCTURE: CPT

## 2020-09-01 PROCEDURE — 2500000003 HC RX 250 WO HCPCS: Performed by: INTERNAL MEDICINE

## 2020-09-01 PROCEDURE — 2580000003 HC RX 258: Performed by: NURSE PRACTITIONER

## 2020-09-01 PROCEDURE — 6370000000 HC RX 637 (ALT 250 FOR IP): Performed by: INTERNAL MEDICINE

## 2020-09-01 PROCEDURE — 6360000002 HC RX W HCPCS: Performed by: NURSE PRACTITIONER

## 2020-09-01 PROCEDURE — 99239 HOSP IP/OBS DSCHRG MGMT >30: CPT | Performed by: INTERNAL MEDICINE

## 2020-09-01 PROCEDURE — 6370000000 HC RX 637 (ALT 250 FOR IP): Performed by: NURSE PRACTITIONER

## 2020-09-01 PROCEDURE — 80048 BASIC METABOLIC PNL TOTAL CA: CPT

## 2020-09-01 PROCEDURE — 83735 ASSAY OF MAGNESIUM: CPT

## 2020-09-01 PROCEDURE — 90935 HEMODIALYSIS ONE EVALUATION: CPT

## 2020-09-01 RX ORDER — FOLIC ACID 1 MG/1
1 TABLET ORAL DAILY
Qty: 30 TABLET | Refills: 0 | Status: SHIPPED | OUTPATIENT
Start: 2020-09-01

## 2020-09-01 RX ORDER — LANOLIN ALCOHOL/MO/W.PET/CERES
100 CREAM (GRAM) TOPICAL DAILY
Qty: 30 TABLET | Refills: 0 | Status: SHIPPED | OUTPATIENT
Start: 2020-09-01

## 2020-09-01 RX ADMIN — HEPARIN SODIUM 5000 UNITS: 5000 INJECTION INTRAVENOUS; SUBCUTANEOUS at 06:58

## 2020-09-01 RX ADMIN — Medication 100 MG: at 07:55

## 2020-09-01 RX ADMIN — Medication 1.2 ML: at 13:31

## 2020-09-01 RX ADMIN — Medication 1 ML: at 13:25

## 2020-09-01 RX ADMIN — CARVEDILOL 6.25 MG: 6.25 TABLET, FILM COATED ORAL at 09:11

## 2020-09-01 RX ADMIN — SODIUM CHLORIDE, PRESERVATIVE FREE 10 ML: 5 INJECTION INTRAVENOUS at 07:55

## 2020-09-01 RX ADMIN — HYDROCODONE BITARTRATE AND ACETAMINOPHEN 1 TABLET: 5; 325 TABLET ORAL at 07:55

## 2020-09-01 ASSESSMENT — PAIN DESCRIPTION - DESCRIPTORS: DESCRIPTORS: PRESSURE

## 2020-09-01 ASSESSMENT — PAIN SCALES - GENERAL
PAINLEVEL_OUTOF10: 0
PAINLEVEL_OUTOF10: 0
PAINLEVEL_OUTOF10: 7
PAINLEVEL_OUTOF10: 0
PAINLEVEL_OUTOF10: 7

## 2020-09-01 ASSESSMENT — PAIN DESCRIPTION - FREQUENCY: FREQUENCY: INTERMITTENT

## 2020-09-01 ASSESSMENT — PAIN DESCRIPTION - ONSET: ONSET: ON-GOING

## 2020-09-01 ASSESSMENT — PAIN DESCRIPTION - ORIENTATION: ORIENTATION: RIGHT;UPPER

## 2020-09-01 ASSESSMENT — PAIN DESCRIPTION - PAIN TYPE: TYPE: OTHER (COMMENT)

## 2020-09-01 ASSESSMENT — PAIN DESCRIPTION - LOCATION: LOCATION: CHEST;ABDOMEN

## 2020-09-01 NOTE — PROGRESS NOTES
HEMODIALYSIS POST TREATMENT NOTE    Treatment time ordered:3.5 hrs  Actual treatment time: 3.5hrs    UltraFiltration Goal: 1500ml  UltraFiltration Removed: 1500ml      Pre Treatment weight: 80.8kg  Post Treatment weight: 79.8kg  Estimated Dry Weight: TBD    Access used:     Central Venous Catheter: Rt IJ CVC      Internal Access:        Access Flow: Good     Sign and symptoms of infection: NO        If YES:     Medications or blood products given: NONE    Regular outpatient schedule: Pt d/c after treatment today. Will be going to [facility] [address] with TTS schedule     Summary of response to treatment: Pt tolerated treatment good, no issues throughout, no interventions needed. CVC working well. Explain if orders NOT met, was physician notified:      Debbie Fuentes faxed to patient unit/ placed in patient chart: yes    Post assessment completed: yes    Report given to: Fatuma George documented Safety Checks include the followin) Access and face visible at all times. 2) All connections and blood lines are secure with no kinks. 3) NVL alarm engaged. 4) Hemosafe device applied (if applicable). 5) No collapse of Arterial or Venous blood chambers. 6) All blood lines / pump segments in the air detectors.

## 2020-09-01 NOTE — DISCHARGE INSTR - COC
Continuity of Care Form    Patient Name: Pema Smith   :  1975  MRN:  [id number]    Admit date:  2020  Discharge date:  ***    Code Status Order: Full Code   Advance Directives:   Advance Care Flowsheet Documentation     Date/Time Healthcare Directive Type of Healthcare Directive Copy in [address] Agent's Name Healthcare Agent's Phone Number    20 1154  No, patient does not have an advance directive for healthcare treatment -- -- -- -- --          Admitting Physician:  Shanna Pickard DO  PCP: No primary care provider on file. Discharging Nurse: [facility] Unit/Room#: 6980/9396-66  Discharging Unit Phone Number: ***    Emergency Contact:   Extended Emergency Contact Information  Primary Emergency Contact: Kevin Stiles [address] Phone: [phone]  Relation: Other  Secondary Emergency Contact: Stefany Mcgowan  [place] Phone: [phone]  Relation: Parent  Preferred language: English   needed?  No    Past Surgical History:  Past Surgical History:   Procedure Laterality Date    IR BIOPSY KIDNEY PERCUTANEOUS  2020    IR BIOPSY KIDNEY PERCUTANEOUS 2020 Matilde Sapp MD [facility]    IR NONTUNNELED VASCULAR CATHETER  2020    IR NONTUNNELED VASCULAR CATHETER 2020 [facility]    IR TUNNELED CATHETER PLACEMENT GREATER THAN 5 YEARS  2020    IR TUNNELED CATHETER PLACEMENT GREATER THAN 5 YEARS 2020 [facility]    TONSILLECTOMY         Immunization History:   Immunization History   Administered Date(s) Administered    Influenza, Quadv, 6 mo and older, IM (Fluzone, Flulaval) 10/13/2017    Pneumococcal Conjugate 13-valent (Enrique Nordmann) 10/13/2017    Tdap (Boostrix, Adacel) 2016       Active Problems:  Patient Active Problem List   Diagnosis Code    Major depressive disorder, recurrent episode, severe ([address] F33.2    Alcohol dependence ([address] F10.20  Cannabis abuse F12.10    Bipolar 1 disorder ([facility]) F31.9    Depression F32.9    Tobacco use Z72.0    Osteoarthritis of both knees M17.0    Schizoaffective disorder, depressive type ([facility]) F25.1    Acute renal failure (ARF) ([facility]) N17.9    Hyponatremia E87.1    Elevated LFTs R94.5    Thrombocytopenia ([facility]) D69.6    Acute kidney injury (HCC) N17.9       Isolation/Infection:   Isolation          No Isolation        Patient Infection Status     None to display          Nurse Assessment:  Last Vital Signs: /89   Pulse 65   Temp 98.8 °F (37.1 °C)   Resp 18   Ht 6' 2\" (1.88 m)   Wt 178 lb 2.1 oz (80.8 kg)   SpO2 94%   BMI 22.87 kg/m²     Last documented pain score (0-10 scale): Pain Level: 0  Last Weight:   Wt Readings from Last 1 Encounters:   20 178 lb 2.1 oz (80.8 kg)     Mental Status:  {IP PT MENTAL STATUS:30001}    IV Access:  { TOMI IV ACCESS:983653853}    Nursing Mobility/ADLs:  Walking   {[facility] DME FVOY:994811170}  Transfer  {P DME PLTM:340287920}  Bathing  {[facility] DME CSLW:463491063}  Dressing  {P DME BOUI:259915597}  Toileting  {P DME DVUA:273965536}  Feeding  {P DME VWCU:092229916}  Med Admin  {[facility] DME ELOB:222036689}  Med Delivery   {[facility] MED Delivery:489182035}    Wound Care Documentation and Therapy:        Elimination:  Continence:   · Bowel: {YES / Q}  · Bladder: {YES / EUGENE:24588}  Urinary Catheter: {Urinary Catheter:631658517}   Colostomy/Ileostomy/Ileal Conduit: {YES / OY:75349}       Date of Last BM: ***    Intake/Output Summary (Last 24 hours) at 2020 1111  Last data filed at 2020 0446  Gross per 24 hour   Intake --   Output 800 ml   Net -800 ml     I/O last 3 completed shifts:  In: -   Out: 1450 [Urine:1450]    Safety Concerns:     [address] [facility] Safety Concerns:068514151}    Impairments/Disabilities:      [address] Impairments/Disabilities:052993577}    Nutrition Therapy:  Current Nutrition Therapy:   [address] Diet List:443625655}    Routes of Feeding: {[facility] DME Other Feedings:017209194}  Liquids: {Slp liquid thickness:12909}  Daily Fluid Restriction: {CHP DME Yes amt example:922329875}  Last Modified Barium Swallow with Video (Video Swallowing Test): {Done Not Done VSKW:897920549}    Treatments at the Time of Hospital Discharge:   Respiratory Treatments: ***  Oxygen Therapy:  {Therapy; copd oxygen:60023}  Ventilator:    { CC Vent HZAI:718615302}    Rehab Therapies: {THERAPEUTIC INTERVENTION:1030224893}  Weight Bearing Status/Restrictions: { CC Weight Bearin}  Other Medical Equipment (for information only, NOT a DME order):  {EQUIPMENT:763480687}  Other Treatments: ***    Patient's personal belongings (please select all that are sent with patient):  {CHP DME Belongings:786233920}    RN SIGNATURE:  {Esignature:147142352}    CASE MANAGEMENT/SOCIAL WORK SECTION    Inpatient Status Date: ***    Readmission Risk Assessment Score:  Readmission Risk              Risk of Unplanned Readmission:        16           Discharging to Facility/ Agency   · Name:   · Address:  · Phone:  · Fax:    Dialysis Facility (if applicable)   · Name:  · Address:  · Dialysis Schedule:  · Phone:  · Fax:    / signature: {Esignature:029480718}    PHYSICIAN SECTION    Prognosis: {Prognosis:4215325208}    Condition at Discharge: [address] Patient Condition:235246664}    Rehab Potential (if transferring to Rehab): {Prognosis:6877747253}    Recommended Labs or Other Treatments After Discharge: ***    Physician Certification: I certify the above information and transfer of Lita Wheatley  is necessary for the continuing treatment of the diagnosis listed and that he requires {Admit to Appropriate Level of Care:47205} for {GREATER/LESS:262812570} 30 days.      Update Admission H&P: {CHP DME Changes in EQZWU:083412859}    PHYSICIAN SIGNATURE:  {Esignature:604044736}

## 2020-09-01 NOTE — CARE COORDINATION
Discharge planning    Chart reviewed. SS working on HD chair, [facility] was full and referral to [facility] was placed on 8/27. Will need tunnel cath placed prior to dc. Patient has pcp list and will follow up if would like an appt made for him once his chair time is known. NEPHRO    Assessment:     1. Acute Kidney Injury: Suspected acute tubular necrosis from intravascular volume depletion from decreased intake, nausea vomiting, NSAID use. Other possibilities include acute interstitial nephritis from NSAIDs. Urine studies do indicate RBCs and WBCs in the urine. Possibility of underlying connective tissue disease/rapidly progressing GN less likely based on patient's presentation. Patient does have proteinuria about 7 g. Creatinine was 0.9 in October 2019, now up to 12, oligo anuric and persists. Clinically was showing signs of uremia including abdominal pain, nausea and vomiting, which have now improved significantly after hemodialysis was done twice. Renal function has not shown any significant improvement since admission.     Plan:   S/p hemodialysis 8/29  S/p kidney biopsy 2/50   Keep systolic pressure more than 110  We will arrange for tunelled catheter and outpatient dialysis part at the [facility] or [facility]
Discharge planning    SS has obtained HD chair time  HD chair time  T-TH-S at 8:00am at the [facility] located at [address] [facility]    Discussed with patient a new pcp on the [facility] list provided. He asked for an office near hospital.     Call to [address] [place] [address].  Appointment made and placed on discharge instructions and notified RN
GWEN received call from [address] [place] [address] with [employer] regarding the pt HD chair time has been approved. T-TH-S at 8:00am at the [facility] located at [facility]. [facility] [address] [place], [facility] [address] fax [phone]    GWEN will update pt JUAN MIGUEL Mccord regarding approval and provide pt with an update.
SW received return call from [facility] regarding referral was received, the [facility] is full and not accepting patients. [facility] admissions will check the [facility]. location.   SW following
outpatient chair.      His [place] [address] [place] his home is [place]    Electronically signed by Bernie Colorado RN on 8/25/20 at 1:20 PM EDT

## 2020-09-01 NOTE — PLAN OF CARE
Problem: Pain:  Goal: Pain level will decrease  Description: Pain level will decrease  9/1/2020 1618 by Jenean Dance, RN  Outcome: Ongoing   Patient states understanding of pain scale and interventions. Pain assessed with hourly rounding and PRN. Patient states pain level is moderate. Will continue to monitor. Problem: Tissue Perfusion - Renal, Altered:  Goal: Serum creatinine will be within specified parameters  Description: Serum creatinine will be within specified parameters  9/1/2020 1618 by Jenean Dance, RN  Outcome: Ongoing   No edema present. Mucous membranes moist. Tolerates fluids. Iv fluids per orders. Problem: Falls - Risk of:  Goal: Will remain free from falls  Description: Will remain free from falls  9/1/2020 1618 by Jenean Dance, RN  Outcome: Ongoing   Patient is fall risk per fall scale. Falling star on door. Fall sticker on armband. Hourly rounding performed. Personal belongings and call light within reach. Bed in low position. Restraint alternatives in place.

## 2020-09-01 NOTE — PROGRESS NOTES
HEMODIALYSIS PRE-TREATMENT NOTE    Patient Identifiers prior to treatment: Name,     Isolation Required: None                       Isolation Type: n/a       (please document if patient is being managed as a PUI/COVID-19 patient)        Hepatitis status:                           Date Drawn                             Result  Hepatitis B Surface Antigen 20 negative        Hepatitis B Surface Antibody 20 >1000        Hepatitis B Core Antibody 20 negative          How was Hepatitis Status verified: Epic     Was a copy of the labs you documented provided to facility for the patient's chart: yes    Hemodialysis orders verified: yes    Access Within normal limits ( I.e. s/s of infection,...): yes     Pre-Assessment completed: yes    Pre-dialysis report received from: Jarred Mckenzie                      Time: Sondra

## 2020-09-01 NOTE — DISCHARGE SUMMARY
[facility]  Office: [address] [facility], , Adore Ventura DO, Lizabeth Leonard DO, Robert Altamirano DO, Manuela Trevino MD, Dinesh Hernandez MD, Bethany Escobar MD, Venus Jackson MD, Preston Arteaga MD, Bernice Best MD, Patrick Kang MD, Richard Caldwell MD, Moi Kebede MD, Rachna Webb DO, Macario Asencio MD, Manolo Fuentes MD, Vanessa Terry DO, Jennifer Hong MD,  Wilmar Elam DO, Reyna Hollins MD, Mahad Rosario MD, Christian Stevens [facility], [facility], CNP, Daniel Roblero, CNP, Severiano Cunning, CNS, Che Gutiérrez, CNP, Prudencio Ahumada, CNP, Yudy Teresa, CNP, Ricardo Hidalgo, CNP, Maddie Brody, CNP, Eugene Mccullough PA-C, Connor Gonsales, [place], Vernon Godoy, CNP, Zeb Spurling, CNP, Larry Borrero, CNP, Cristofer Augustine, CNP, Crista Clay, [facility]   [address] [facility]    Discharge Summary     Patient ID: Shannon Reagan  :  1975   MRN: [id number]     ACCOUNT:  [de-identified]   Patient's PCP: No primary care provider on file. Admit Date: 2020   Discharge Date: 2020     Length of Stay: 8  Code Status:  Full Code  Admitting Physician: Durga Merida DO  Discharge Physician: Macario Asencio MD     Active Discharge Diagnoses:     Hospital Problem Lists:  Principal Problem:    Acute renal failure (ARF) ([facility]  Active Problems:    Alcohol dependence ([facility]    Cannabis abuse    Bipolar 1 disorder ([facility]    Tobacco use    Schizoaffective disorder, depressive type ([facility]    Hyponatremia    Elevated LFTs    Thrombocytopenia ([facility]    Acute kidney injury ([facility]  Resolved Problems:    * No resolved hospital problems. *      Admission Condition:  serious     Discharged Condition: fair    Hospital Stay:     Hospital Course:  Shannon Reagan is a 50 y.o. male who was admitted for the management of   Acute renal failure (ARF) ([facility] , presented to ER with Abdominal Pain and Emesis (x 3 days)      Keiko Bosch a 50 y.o.  M who presented with abdominal pain and vomiting. States symptoms going on for the past few days. He is a daily drinker, he drinks 6 beers per day but sometimes more. He denies any similar issues in the past and his previous creatinine has been within normal limits. In ER creatinine found to be elevated at 12.2. Nephrology consulted, started on HD. Underwent renal biopsy. Physical exam     Alert  Chest: Clear to auscultation bilateral  Abdomen: Nontender nondistended  CVS: S1-S2  Neurology: Moves extremity    Principal Problem:    Acute renal failure (ARF) ([facility] status post biopsy follow-up on the biopsy results most likely patient currently now has acute renal failure with ATN currently he is ESRD required regular dialysis    Active Problems:    Alcohol dependence ([facility] counseling      Cannabis abuse counseling       Bipolar 1 disorder ([facility] continue home medication       Hyponatremia resolved      Elevated LFTs repeat CMP in 1 week      Thrombocytopenia (HCC) repeat CBC in 1 week    Abnormal CT scan as below  Multiple small lucencies noted in the bilateral iliac bones.  These are    nonspecific and could be related to heterogeneous bone mineralization.  Other    etiologies are not excluded.  If the patient has a history of malignancy,    consider bone scan for further evaluation.     Follow-up with PCP as outpatient may need age-appropriate cancer screening also recommend follow-up with hematology oncology as outpatient        Significant therapeutic interventions:     Significant Diagnostic Studies:   Labs / Micro:  CBC:   Lab Results   Component Value Date    WBC 11.4 08/31/2020    RBC 3.67 08/31/2020    HGB 11.8 08/31/2020    HCT 36.2 08/31/2020    MCV 98.6 08/31/2020    MCH 32.2 08/31/2020    MCHC 32.6 08/31/2020    RDW 12.6 08/31/2020     08/31/2020     BMP:    Lab Results   Component Value Date    GLUCOSE 103 09/01/2020     09/01/2020    K 4.2 09/01/2020    CL 99 09/01/2020    CO2 23 09/01/2020    ANIONGAP 14 09/01/2020    BUN 53 09/01/2020    CREATININE 7.68 09/01/2020    BUNCRER 7 09/01/2020    CALCIUM 8.8 09/01/2020    LABGLOM 8 09/01/2020    GFRAA 9 09/01/2020    GFR      09/01/2020    GFR NOT REPORTED 09/01/2020     HFP:    Lab Results   Component Value Date    PROT 5.6 08/28/2020     CMP:    Lab Results   Component Value Date    GLUCOSE 103 09/01/2020     09/01/2020    K 4.2 09/01/2020    CL 99 09/01/2020    CO2 23 09/01/2020    BUN 53 09/01/2020    CREATININE 7.68 09/01/2020    ANIONGAP 14 09/01/2020    ALKPHOS 46 08/28/2020     08/28/2020    AST 17 08/28/2020    BILITOT 0.85 08/28/2020    LABALBU 2.9 08/28/2020    ALBUMIN NOT REPORTED 08/28/2020    LABGLOM 8 09/01/2020    GFRAA 9 09/01/2020    GFR      09/01/2020    GFR NOT REPORTED 09/01/2020    PROT 5.6 08/28/2020    CALCIUM 8.8 09/01/2020     PT/INR:    Lab Results   Component Value Date    PROTIME 13.2 08/31/2020    INR 1.0 08/31/2020     PTT: No results found for: APTT  FLP:  No results found for: CHOL, TRIG, HDL  U/A:    Lab Results   Component Value Date    COLORU YELLOW 08/25/2020    TURBIDITY CLEAR 08/25/2020    SPECGRAV 1.010 08/25/2020    HGBUR 1+ 08/25/2020    PHUR 7.0 08/25/2020    PROTEINU 2+ 08/25/2020    GLUCOSEU NEGATIVE 08/25/2020    KETUA NEGATIVE 08/25/2020    BILIRUBINUR NEGATIVE 08/25/2020    UROBILINOGEN Normal 08/25/2020    NITRU NEGATIVE 08/25/2020    LEUKOCYTESUR NEGATIVE 08/25/2020     TSH:    Lab Results   Component Value Date    TSH 1.96 08/24/2020        Radiology:  Xr Chest (single View Frontal)    Result Date: 8/28/2020  Stable chest Line placement as above Slight interval improvement in radiographic findings consistent with pulmonary edema/bilateral pleural effusions     Xr Chest Portable    Result Date: 8/26/2020  Increasing hazy opacifications of the lower lung zones suggest developing effusion. Emphysema.      Ir Tunneled Cvc Place Wo Sq Port/pump > 5 Years    Result Date: 8/31/2020  Successful ultrasound and fluoroscopy guided tunneled catheter placement using a 23 cm tip to cuff palindrome. Ir Biopsy Kidney Percutaneous    Result Date: 8/28/2020  Technically successful ultrasound-guided random core biopsy of the left lower pole kidney. Consultations:    Consults:     Final Specialist Recommendations/Findings:   IP CONSULT TO HOSPITALIST  IP CONSULT TO NEPHROLOGY  IP CONSULT TO SOCIAL WORK      The patient was seen and examined on day of discharge and this discharge summary is in conjunction with any daily progress note from day of discharge. Discharge plan:     Disposition: Home    Physician Follow Up:     No follow-up provider specified. Requiring Further Evaluation/Follow Up POST HOSPITALIZATION/Incidental Findings:     Diet: renal diet    Activity: As tolerated    Instructions to Patient:     Discharge Medications:      Medication List      START taking these medications    carvedilol 6.25 MG tablet  Commonly known as:  COREG  Take 1 tablet by mouth 2 times daily (with meals)     HYDROcodone-acetaminophen 5-325 MG per tablet  Commonly known as:  NORCO  Take 1 tablet by mouth every 6 hours as needed for Pain for up to 3 days.         CONTINUE taking these medications    cyclobenzaprine 10 MG tablet  Commonly known as:  FLEXERIL     ibuprofen 800 MG tablet  Commonly known as:  ADVIL;MOTRIN  Take 1 tablet by mouth every 8 hours as needed for Pain or Fever     meloxicam 15 MG tablet  Commonly known as:  MOBIC  TAKE 1 TABLET BY MOUTH DAILY     OLANZapine 10 MG tablet  Commonly known as:  ZYPREXA        STOP taking these medications    Mapap 500 MG tablet  Generic drug:  acetaminophen     predniSONE 5 MG tablet  Commonly known as:  DELTASONE     triamcinolone 0.025 % cream  Commonly known as:  KENALOG           Where to Get Your Medications      These medications were sent to [address] [place] [address] 21  [address] [facility] [address] Phone:  [phone]   · carvedilol 6.25 MG tablet     You can get these medications from any pharmacy    Bring a paper prescription for each of these medications  · HYDROcodone-acetaminophen 5-325 MG per tablet         No discharge procedures on file. Time Spent on discharge is  35 mins in patient examination, evaluation, counseling as well as medication reconciliation, prescriptions for required medications, discharge plan and follow up. Electronically signed by   Yuriy Villa MD  9/1/2020  11:11 AM      Thank you Dr. Juan Antonio Art primary care provider on file. for the opportunity to be involved in this patient's care.

## 2020-09-01 NOTE — PROGRESS NOTES
Renal Progress Note    Patient :  Heather Blanc; 50 y.o. MRN# [id number]  Location:  0556/8895-96  Attending:  Kaelyn Jacob MD  Admit Date:  8/24/2020   Hospital Day: 8      Subjective:     Patient is a 25-year-old gentleman who presented for evaluation of abdominal pain, nausea, vomiting and inability to keep much down for last 4 days. He does routinely drink about 6 pack beer per day for admission was 5 days ago he did drink a 12 pack beer. Patient also has osteoarthritis of knees and very regularly used to take Mobic and occasionally Aleve. His creatinine on admission was 12.26 mg/DL. His creatinine was 0.9 in October 2019. Urine drug screen was positive for cocaine. Ultrasound done on 8/24/2020 showed echogenic kidneys around 12 cm left and 14 cm right. Renal serologies (8/24/2020) Were essentially negative C3 of 70. HIV was negative. UPC was 7.06.  UA did show RBC 10-20. Patient started having signs of uremia and required incision of dialysis which was initiated on 8/25/2020. Patient was seen and examined on HD. Tolerating the procedure okay. No new issues reported overnight. He did make about 1.4 L a urine in the last 24 hours. Patient has been having poor clearances and has required hemodialysis which was initiated on 8/24/2020. Patient did have a native kidney biopsy done on 8/28/2020. Results pending. He did get tunneled catheter placement done yesterday 8/31/2020.       Outpatient Medications:     Medications Prior to Admission: OLANZapine (ZYPREXA) 10 MG tablet, Take 10 mg by mouth nightly   cyclobenzaprine (FLEXERIL) 10 MG tablet, Take 10 mg by mouth daily as needed for Muscle spasms  ibuprofen (ADVIL;MOTRIN) 800 MG tablet, Take 1 tablet by mouth every 8 hours as needed for Pain or Fever  meloxicam (MOBIC) 15 MG tablet, TAKE 1 TABLET BY MOUTH DAILY  [DISCONTINUED] MAPAP 500 MG tablet, TAKE 2 TABLETS BY MOUTH EVERY 6 HOURS AS NEEDED FOR PAIN  [DISCONTINUED] V-R CALAMINE LOTN, Apply 1 Act topically 2 times daily  [DISCONTINUED] sertraline (ZOLOFT) 50 MG tablet, Take 1 tablet by mouth daily. (Patient taking differently: Take 50 mg by mouth 2 times daily )    Current Medications:     Scheduled Meds:    carvedilol  6.25 mg Oral BID WC    sodium chloride flush  10 mL Intravenous 2 times per day    heparin (porcine)  5,000 Units Subcutaneous 3 times per day    thiamine  100 mg Oral Daily    OLANZapine  10 mg Oral Nightly     Continuous Infusions:    sodium chloride 20 mL/hr at 20 1704     PRN Meds:  hydrALAZINE, acetaminophen, HYDROcodone 5 mg - acetaminophen, sodium citrate, sodium citrate, heparin (porcine), heparin (porcine), sodium chloride flush, [DISCONTINUED] acetaminophen **OR** acetaminophen, promethazine **OR** ondansetron, nicotine    Input/Output:       I/O last 3 completed shifts:  In: -   Out: 1450 [Urine:1450]. Patient Vitals for the past 96 hrs (Last 3 readings):   Weight   20 0614 178 lb 3.2 oz (80.8 kg)   20 0554 173 lb 14.4 oz (78.9 kg)   20 1019 173 lb 8 oz (78.7 kg)       Vital Signs:   Temperature:  Temp: 98.8 °F (37.1 °C)  TMax:   Temp (24hrs), Av.3 °F (36.8 °C), Min:97.7 °F (36.5 °C), Max:98.8 °F (37.1 °C)    Respirations:  Resp: 16  Pulse:   Pulse: 63  BP:    BP: (!) 153/85  BP Range: Systolic (59QCI), CAS:290 , Min:135 , AQE:002       Diastolic (10FWF), VTQ:46, Min:78, Max:102      Physical Examination:     General:  AAO x 3, speaking in full sentences, no accessory muscle use. HEENT: Atraumatic, normocephalic, no throat congestion, moist mucosa. Eyes:   Pupils equal, round and reactive to light, EOMI. Neck:   Supple  Chest:   Bilateral vesicular breath sounds, no rales or wheezes. Cardiac:  S1 S2 RR, no murmurs, gallops or rubs. Abdomen: Soft, non-tender, no masses or organomegaly, BS audible. :   No suprapubic or flank tenderness. Neuro:  AAO x 3, No FND. SKIN:  No rashes, good skin turgor. Extremities:  No edema.     Labs: Recent Labs     08/30/20  0533 08/31/20  0541   WBC 11.0 11.4*   RBC 3.57* 3.67*   HGB 11.7* 11.8*   HCT 35.3* 36.2*   MCV 98.9 98.6   MCH 32.8 32.2   MCHC 33.1 32.6   RDW 12.7 12.6    311   MPV 9.8 9.8      BMP:   Recent Labs     08/30/20  0533 08/31/20  0541 09/01/20  0626    136 136   K 4.2 4.4 4.2   CL 99 98 99   CO2 26 24 23   BUN 26* 42* 53*   CREATININE 6.19* 7.42* 7.68*   GLUCOSE 111* 89 103*   CALCIUM 8.5* 8.6 8.8      Phosphorus:     Recent Labs     08/30/20  0533 08/31/20  0541 09/01/20  0626   PHOS 3.9 6.0* 6.4*     Magnesium:    Recent Labs     08/30/20  0533 08/31/20  0541 09/01/20  0626   MG 1.7 1.7 1.6     YULIA:      Lab Results   Component Value Date    YULIA NEGATIVE 08/24/2020     SPEP:  Lab Results   Component Value Date    PROT 5.6 08/28/2020    ALBCAL 3.4 08/24/2020    ALBPCT 64 08/24/2020    LABALPH 0.2 08/24/2020    LABALPH 0.5 08/24/2020    A1PCT 3 08/24/2020    A2PCT 9 08/24/2020    LABBETA 0.6 08/24/2020    BETAPCT 11 08/24/2020    GAMGLOB 0.7 08/24/2020    GGPCT 12 08/24/2020    PATH ELECTRONICALLY SIGNED.  Regina Temple M.D. 08/24/2020     C3:     Lab Results   Component Value Date    C3 70 08/24/2020     C4:     Lab Results   Component Value Date    C4 15 08/24/2020     MPO ANCA:     Lab Results   Component Value Date    MPO 5 08/24/2020     PR3 ANCA:     Lab Results   Component Value Date    PR3 9 08/24/2020     Anti-GBM:     Lab Results   Component Value Date    GBMABIGG 5 08/25/2020     Hep BsAg:         Lab Results   Component Value Date    HEPBSAG NONREACTIVE 08/24/2020     Hep C AB:          Lab Results   Component Value Date    HEPCAB NONREACTIVE 08/24/2020       Urinalysis/Chemistries:      Lab Results   Component Value Date    NITRU NEGATIVE 08/25/2020    COLORU YELLOW 08/25/2020    PHUR 7.0 08/25/2020    WBCUA 5 TO 10 08/25/2020    RBCUA 10 TO 20 08/25/2020    MUCUS NOT REPORTED 08/25/2020    TRICHOMONAS NOT REPORTED 08/25/2020    YEAST NOT REPORTED 08/25/2020    BACTERIA NOT REPORTED 08/25/2020    SPECGRAV 1.010 08/25/2020    LEUKOCYTESUR NEGATIVE 08/25/2020    UROBILINOGEN Normal 08/25/2020    BILIRUBINUR NEGATIVE 08/25/2020    GLUCOSEU NEGATIVE 08/25/2020    KETUA NEGATIVE 08/25/2020    AMORPHOUS NOT REPORTED 08/25/2020     Urine Sodium:     Lab Results   Component Value Date    ANDREA 43 08/24/2020     Urine Osmolarity:   Lab Results   Component Value Date    OSMOU 209 08/24/2020       Urine Creatinine:     Lab Results   Component Value Date    LABCREA 63.1 08/24/2020     Radiology:     Reviewed. Assessment:     1. Acute kidney injury likely due to ischemic ATN along with some prerenal factors due to decreased intake, nausea, and vomiting, also complicated by underlying NSAID and cocaine usage along with nephrotic range proteinuria, complete etiology currently not clear. Status post biopsy and results are pending. 2. Nephrotic range proteinuria  3. Dialysis dependent   4. Status post native kidney biopsy on 8/28/2020  5. Chronic liver disease with thrombocytopenia  6. Anemia  7. Schizoaffective disorder  8. Substance abuse history, cocaine positive. Plan:   1. Patient was seen and examined on HD at bedside. Orders were confirmed with the HD nurse. 2.  Follow-up renal biopsy results. 3.  Patient be okay to discharge from nephrology standpoint once his outpatient hemodialysis spot has been confirmed. 4.  BMP in a.m.  5.  Will follow. Patient's nurse was updated. Nutrition   Please ensure that patient is on a renal diet/TF. Avoid nephrotoxic drugs/contrast exposure. We will continue to follow along with you. Santi Castanon MD  [facility]     This note is created with the assistance of a speech-recognition program. While intending to generate a document that actually reflects the content of the visit, no guarantees can be provided that every mistake has been identified and corrected by editing.

## 2020-10-08 ENCOUNTER — HOSPITAL ENCOUNTER (EMERGENCY)
Age: 45
Discharge: HOME | End: 2020-10-08
Attending: EMERGENCY MEDICINE
Payer: MEDICAID

## 2020-10-08 VITALS
DIASTOLIC BLOOD PRESSURE: 98 MMHG | HEIGHT: 75 IN | HEART RATE: 115 BPM | OXYGEN SATURATION: 99 % | BODY MASS INDEX: 21.76 KG/M2 | SYSTOLIC BLOOD PRESSURE: 142 MMHG | WEIGHT: 175 LBS | TEMPERATURE: 98.4 F | RESPIRATION RATE: 20 BRPM

## 2020-10-08 LAB
ABSOLUTE EOS #: 0.29 K/UL (ref 0–0.44)
ABSOLUTE IMMATURE GRANULOCYTE: 0.03 K/UL (ref 0–0.3)
ABSOLUTE LYMPH #: 3.37 K/UL (ref 1.1–3.7)
ABSOLUTE MONO #: 0.71 K/UL (ref 0.1–1.2)
ACETAMINOPHEN LEVEL: <10 UG/ML (ref 10–30)
ALBUMIN SERPL-MCNC: 4.3 G/DL (ref 3.5–5.2)
ALBUMIN/GLOBULIN RATIO: ABNORMAL (ref 1–2.5)
ALP BLD-CCNC: 71 U/L (ref 40–129)
ALT SERPL-CCNC: 14 U/L (ref 5–41)
AMMONIA: 26 UMOL/L (ref 16–60)
AMYLASE: 53 U/L (ref 28–100)
ANION GAP SERPL CALCULATED.3IONS-SCNC: 10 MMOL/L (ref 9–17)
AST SERPL-CCNC: 21 U/L
BASOPHILS # BLD: 0 % (ref 0–2)
BASOPHILS ABSOLUTE: 0.03 K/UL (ref 0–0.2)
BILIRUB SERPL-MCNC: 0.25 MG/DL (ref 0.3–1.2)
BILIRUBIN DIRECT: 0.1 MG/DL
BILIRUBIN URINE: NEGATIVE
BILIRUBIN, INDIRECT: 0.15 MG/DL (ref 0–1)
BNP INTERPRETATION: NORMAL
BUN BLDV-MCNC: 14 MG/DL (ref 6–20)
BUN/CREAT BLD: 14 (ref 9–20)
CALCIUM SERPL-MCNC: 8.7 MG/DL (ref 8.6–10.4)
CHLORIDE BLD-SCNC: 106 MMOL/L (ref 98–107)
CO2: 23 MMOL/L (ref 20–31)
COLOR: YELLOW
COMMENT UA: NORMAL
CREAT SERPL-MCNC: 1.02 MG/DL (ref 0.7–1.2)
DIFFERENTIAL TYPE: ABNORMAL
EOSINOPHILS RELATIVE PERCENT: 4 % (ref 1–4)
ETHANOL PERCENT: 0.28 %
ETHANOL: 277 MG/DL
GFR AFRICAN AMERICAN: >60 ML/MIN
GFR NON-AFRICAN AMERICAN: >60 ML/MIN
GFR SERPL CREATININE-BSD FRML MDRD: ABNORMAL ML/MIN/{1.73_M2}
GFR SERPL CREATININE-BSD FRML MDRD: ABNORMAL ML/MIN/{1.73_M2}
GLOBULIN: ABNORMAL G/DL (ref 1.5–3.8)
GLUCOSE BLD-MCNC: 102 MG/DL (ref 70–99)
GLUCOSE URINE: NEGATIVE
HCT VFR BLD CALC: 36.8 % (ref 40.7–50.3)
HEMOGLOBIN: 12.2 G/DL (ref 13–17)
IMMATURE GRANULOCYTES: 0 %
INR BLD: 1
KETONES, URINE: NEGATIVE
LACTIC ACID: 1.1 MMOL/L (ref 0.5–2.2)
LEUKOCYTE ESTERASE, URINE: NEGATIVE
LIPASE: 143 U/L (ref 13–60)
LYMPHOCYTES # BLD: 46 % (ref 24–43)
MAGNESIUM: 1.8 MG/DL (ref 1.6–2.6)
MCH RBC QN AUTO: 32.4 PG (ref 25.2–33.5)
MCHC RBC AUTO-ENTMCNC: 33.2 G/DL (ref 28.4–34.8)
MCV RBC AUTO: 97.6 FL (ref 82.6–102.9)
MONOCYTES # BLD: 10 % (ref 3–12)
NITRITE, URINE: NEGATIVE
NRBC AUTOMATED: 0 PER 100 WBC
PDW BLD-RTO: 12.9 % (ref 11.8–14.4)
PH UA: 5.5 (ref 5–8)
PLATELET # BLD: 223 K/UL (ref 138–453)
PLATELET ESTIMATE: ABNORMAL
PMV BLD AUTO: 9.3 FL (ref 8.1–13.5)
POTASSIUM SERPL-SCNC: 4.3 MMOL/L (ref 3.7–5.3)
PRO-BNP: 59 PG/ML
PROTEIN UA: NEGATIVE
PROTHROMBIN TIME: 12.7 SEC (ref 11.5–14.2)
RBC # BLD: 3.77 M/UL (ref 4.21–5.77)
RBC # BLD: ABNORMAL 10*6/UL
SALICYLATE LEVEL: <1 MG/DL (ref 3–10)
SEG NEUTROPHILS: 40 % (ref 36–65)
SEGMENTED NEUTROPHILS ABSOLUTE COUNT: 2.98 K/UL (ref 1.5–8.1)
SODIUM BLD-SCNC: 139 MMOL/L (ref 135–144)
SPECIFIC GRAVITY UA: 1.01 (ref 1–1.03)
TOTAL PROTEIN: 7 G/DL (ref 6.4–8.3)
TOXIC TRICYCLIC SC,BLOOD: NEGATIVE
TURBIDITY: CLEAR
URINE HGB: NEGATIVE
UROBILINOGEN, URINE: NORMAL
WBC # BLD: 7.4 K/UL (ref 3.5–11.3)
WBC # BLD: ABNORMAL 10*3/UL

## 2020-10-08 PROCEDURE — G0480 DRUG TEST DEF 1-7 CLASSES: HCPCS

## 2020-10-08 PROCEDURE — 96375 TX/PRO/DX INJ NEW DRUG ADDON: CPT

## 2020-10-08 PROCEDURE — 99282 EMERGENCY DEPT VISIT SF MDM: CPT

## 2020-10-08 PROCEDURE — 82150 ASSAY OF AMYLASE: CPT

## 2020-10-08 PROCEDURE — 85025 COMPLETE CBC W/AUTO DIFF WBC: CPT

## 2020-10-08 PROCEDURE — 85610 PROTHROMBIN TIME: CPT

## 2020-10-08 PROCEDURE — 99281 EMR DPT VST MAYX REQ PHY/QHP: CPT

## 2020-10-08 PROCEDURE — 83605 ASSAY OF LACTIC ACID: CPT

## 2020-10-08 PROCEDURE — C9113 INJ PANTOPRAZOLE SODIUM, VIA: HCPCS | Performed by: EMERGENCY MEDICINE

## 2020-10-08 PROCEDURE — 81003 URINALYSIS AUTO W/O SCOPE: CPT

## 2020-10-08 PROCEDURE — 6360000002 HC RX W HCPCS: Performed by: EMERGENCY MEDICINE

## 2020-10-08 PROCEDURE — 83735 ASSAY OF MAGNESIUM: CPT

## 2020-10-08 PROCEDURE — 80307 DRUG TEST PRSMV CHEM ANLYZR: CPT

## 2020-10-08 PROCEDURE — 80076 HEPATIC FUNCTION PANEL: CPT

## 2020-10-08 PROCEDURE — 2500000003 HC RX 250 WO HCPCS: Performed by: EMERGENCY MEDICINE

## 2020-10-08 PROCEDURE — 2580000003 HC RX 258: Performed by: EMERGENCY MEDICINE

## 2020-10-08 PROCEDURE — 83880 ASSAY OF NATRIURETIC PEPTIDE: CPT

## 2020-10-08 PROCEDURE — 82140 ASSAY OF AMMONIA: CPT

## 2020-10-08 PROCEDURE — 96365 THER/PROPH/DIAG IV INF INIT: CPT

## 2020-10-08 PROCEDURE — 80048 BASIC METABOLIC PNL TOTAL CA: CPT

## 2020-10-08 PROCEDURE — 96372 THER/PROPH/DIAG INJ SC/IM: CPT

## 2020-10-08 PROCEDURE — 83690 ASSAY OF LIPASE: CPT

## 2020-10-08 RX ORDER — CYANOCOBALAMIN 1000 UG/ML
1000 INJECTION INTRAMUSCULAR; SUBCUTANEOUS ONCE
Status: COMPLETED | OUTPATIENT
Start: 2020-10-08 | End: 2020-10-08

## 2020-10-08 RX ORDER — SODIUM CHLORIDE 9 MG/ML
10 INJECTION INTRAVENOUS ONCE
Status: COMPLETED | OUTPATIENT
Start: 2020-10-08 | End: 2020-10-08

## 2020-10-08 RX ORDER — PANTOPRAZOLE SODIUM 40 MG/10ML
40 INJECTION, POWDER, LYOPHILIZED, FOR SOLUTION INTRAVENOUS ONCE
Status: COMPLETED | OUTPATIENT
Start: 2020-10-08 | End: 2020-10-08

## 2020-10-08 RX ORDER — ONDANSETRON 2 MG/ML
8 INJECTION INTRAMUSCULAR; INTRAVENOUS ONCE
Status: COMPLETED | OUTPATIENT
Start: 2020-10-08 | End: 2020-10-08

## 2020-10-08 RX ORDER — SODIUM CHLORIDE 9 MG/ML
INJECTION, SOLUTION INTRAVENOUS CONTINUOUS
Status: DISCONTINUED | OUTPATIENT
Start: 2020-10-08 | End: 2020-10-08 | Stop reason: HOSPADM

## 2020-10-08 RX ADMIN — Medication 10 ML: at 03:32

## 2020-10-08 RX ADMIN — PANTOPRAZOLE SODIUM 40 MG: 40 INJECTION, POWDER, FOR SOLUTION INTRAVENOUS at 03:31

## 2020-10-08 RX ADMIN — FOLIC ACID: 5 INJECTION, SOLUTION INTRAMUSCULAR; INTRAVENOUS; SUBCUTANEOUS at 04:05

## 2020-10-08 RX ADMIN — SODIUM CHLORIDE: 9 INJECTION, SOLUTION INTRAVENOUS at 03:31

## 2020-10-08 RX ADMIN — ONDANSETRON 8 MG: 2 INJECTION INTRAMUSCULAR; INTRAVENOUS at 03:31

## 2020-10-08 RX ADMIN — CYANOCOBALAMIN 1000 MCG: 1000 INJECTION, SOLUTION INTRAMUSCULAR; SUBCUTANEOUS at 03:32

## 2020-10-08 ASSESSMENT — PAIN SCALES - GENERAL: PAINLEVEL_OUTOF10: 7

## 2020-10-08 NOTE — ED PROVIDER NOTES
Depression     Osteoarthritis of both knees 2017       SURGICAL HISTORY           Procedure Laterality Date    IR BIOPSY KIDNEY PERCUTANEOUS  2020    IR BIOPSY KIDNEY PERCUTANEOUS 2020 Anum Lovelace MD [facility]    IR NONTUNNELED VASCULAR CATHETER  2020    IR NONTUNNELED VASCULAR CATHETER 2020 [facility]    IR TUNNELED CATHETER PLACEMENT GREATER THAN 5 YEARS  2020    IR TUNNELED CATHETER PLACEMENT GREATER THAN 5 YEARS 2020 [facility]    TONSILLECTOMY           FAMILY HISTORY           Problem Relation Age of Onset    Heart Disease Father      Family Status   Relation Name Status    Mother  Alive    Father      Sister  Alive    Brother  Alive        SOCIAL HISTORY      reports that he has been smoking cigarettes. He has been smoking about 1.00 pack per day. He has never used smokeless tobacco. He reports current alcohol use. He reports previous drug use. Drug: Marijuana. REVIEW OF SYSTEMS    (2-9 systems for level 4, 10 or more for level 5)     Review of Systems   All other systems reviewed and are negative. Except as noted above the remainder of the review of systems was reviewed and negative. PHYSICAL EXAM    (up to 7 for level 4, 8 or more for level 5)     Vitals:    10/08/20 0244 10/08/20 0245   BP: (!) 142/98    Pulse: 115    Resp: 20    Temp: 98.4 °F (36.9 °C)    SpO2: 99%    Weight:  175 lb (79.4 kg)   Height:  6' 3\" (1.905 m)       Physical exam reflects a relatively well-nourished male. He is afebrile. Vital signs stable to include pulse ox of 99% on room air. He is not hypoxic. He is alert conversive appropriate in behavior. He is mildly pale. Oral pharyngeal exam shows dry membranes no lesion. Heart regular rate and rhythm normal S1-S2 no murmurs rubs gallops. Lungs are diminished throughout no wheezes or rhonchi. Abdomen is soft no focal rebound or guarding.   Bowel sounds are appreciated. Extremities show no significant edema. Visualized integument without rash or lesion. He has no acute neurovascular deficits      DIAGNOSTIC RESULTS     EKG: All EKG's are interpreted by the Emergency Department Physician who either signs or Co-signs this chart in the absence of a cardiologist.      RADIOLOGY:   Non-plain film images such as CT, Ultrasound and MRI are read by the radiologist. Plain radiographic images are visualized and preliminarily interpreted by the emergency physician with the below findings:      Interpretation per the Radiologist below, if available at the time of this note:    No orders to display       LABS:  [address] - Abnormal; Notable for the following components:       Result Value    Glucose 102 (*)     All other components within normal limits   CBC WITH AUTO DIFFERENTIAL - Abnormal; Notable for the following components:    RBC 3.77 (*)     Hemoglobin 12.2 (*)     Hematocrit 36.8 (*)     Lymphocytes 46 (*)     All other components within normal limits   HEPATIC FUNCTION PANEL - Abnormal; Notable for the following components: Total Bilirubin 0.25 (*)     All other components within normal limits   LIPASE - Abnormal; Notable for the following components:    Lipase 143 (*)     All other components within normal limits   TOX SCR, BLD, ED - Abnormal; Notable for the following components:    Acetaminophen Level <10 (*)     Ethanol 277 (*)     Ethanol percent 0.932 (*)     Salicylate Lvl <1 (*)     All other components within normal limits   AMYLASE   BRAIN NATRIURETIC PEPTIDE   PROTIME-INR   LACTIC ACID   MAGNESIUM   AMMONIA   URINALYSIS       All other labs were within normal range or not returned as of this dictation.     EMERGENCY DEPARTMENT COURSE and DIFFERENTIAL DIAGNOSIS/MDM:   Vitals:    Vitals:    10/08/20 0244 10/08/20 0245   BP: (!) 142/98    Pulse: 115    Resp: 20    Temp: 98.4 °F (36.9 °C)    SpO2: 99%    Weight:  175 lb (79.4 kg) Height:  6' 3\" (1.905 m)     Patient is treated symptomatically for his nausea and chronic alcoholism. His renal function is normal.  He is intoxicated. No other significant abnormalities noted. Patient will be discharged home when he is reasonably sober. He is advised to continue follow-up in place with his treating physician team.    CONSULTS:  None    PROCEDURES:  None    FINAL IMPRESSION      1. Acute alcoholic intoxication without complication ([facility] [address]    2.  Chronic alcoholism [facility])          DISPOSITION/PLAN   DISPOSITION Decision To Discharge 10/08/2020 05:07:32 AM      PATIENT REFERRED TO:   [facility] AT [place] [id number] [phone]  Schedule an appointment as soon as possible for a visit       ALCOHOLICS ANONYMOUS  [phone]          DISCHARGE MEDICATIONS:     New Prescriptions    No medications on file           (Please note that portions of this note were completed with a voice recognition program.  Efforts were made to edit the dictations but occasionally words are mis-transcribed.)    Sandra Mckinney MD  Attending Emergency Physician          Sandra Mckinney MD  10/08/20 4271

## 2020-11-03 PROBLEM — N17.9 ACUTE RENAL FAILURE (ARF) (HCC): Status: RESOLVED | Noted: 2020-08-24 | Resolved: 2020-11-03

## 2023-04-15 ENCOUNTER — APPOINTMENT (OUTPATIENT)
Dept: GENERAL RADIOLOGY | Age: 48
End: 2023-04-15
Payer: MEDICAID

## 2023-04-15 ENCOUNTER — APPOINTMENT (OUTPATIENT)
Dept: CT IMAGING | Age: 48
End: 2023-04-15
Payer: MEDICAID

## 2023-04-15 ENCOUNTER — HOSPITAL ENCOUNTER (EMERGENCY)
Age: 48
Discharge: HOME | End: 2023-04-15
Attending: EMERGENCY MEDICINE
Payer: MEDICAID

## 2023-04-15 VITALS
HEIGHT: 72 IN | RESPIRATION RATE: 16 BRPM | OXYGEN SATURATION: 94 % | DIASTOLIC BLOOD PRESSURE: 78 MMHG | SYSTOLIC BLOOD PRESSURE: 118 MMHG | TEMPERATURE: 97.4 F | WEIGHT: 202 LBS | BODY MASS INDEX: 27.36 KG/M2 | HEART RATE: 90 BPM

## 2023-04-15 DIAGNOSIS — S52.502A CLOSED FRACTURE OF DISTAL END OF LEFT RADIUS, UNSPECIFIED FRACTURE MORPHOLOGY, INITIAL ENCOUNTER: Primary | ICD-10-CM

## 2023-04-15 PROCEDURE — 71045 X-RAY EXAM CHEST 1 VIEW: CPT

## 2023-04-15 PROCEDURE — 29125 APPL SHORT ARM SPLINT STATIC: CPT

## 2023-04-15 PROCEDURE — 96372 THER/PROPH/DIAG INJ SC/IM: CPT

## 2023-04-15 PROCEDURE — 2500000003 HC RX 250 WO HCPCS

## 2023-04-15 PROCEDURE — 25600 CLTX DST RDL FX/EPHYS SEP WO: CPT

## 2023-04-15 PROCEDURE — 73090 X-RAY EXAM OF FOREARM: CPT

## 2023-04-15 PROCEDURE — 73100 X-RAY EXAM OF WRIST: CPT

## 2023-04-15 PROCEDURE — 73110 X-RAY EXAM OF WRIST: CPT

## 2023-04-15 PROCEDURE — 73200 CT UPPER EXTREMITY W/O DYE: CPT

## 2023-04-15 PROCEDURE — 6360000002 HC RX W HCPCS

## 2023-04-15 PROCEDURE — 99284 EMERGENCY DEPT VISIT MOD MDM: CPT

## 2023-04-15 RX ORDER — KETOROLAC TROMETHAMINE 30 MG/ML
30 INJECTION, SOLUTION INTRAMUSCULAR; INTRAVENOUS ONCE
Status: COMPLETED | OUTPATIENT
Start: 2023-04-15 | End: 2023-04-15

## 2023-04-15 RX ORDER — OXYCODONE HYDROCHLORIDE 5 MG/1
5 TABLET ORAL EVERY 8 HOURS PRN
Qty: 5 TABLET | Refills: 0 | Status: SHIPPED | OUTPATIENT
Start: 2023-04-15 | End: 2023-04-19

## 2023-04-15 RX ORDER — IBUPROFEN 800 MG/1
800 TABLET ORAL EVERY 8 HOURS PRN
Qty: 21 TABLET | Refills: 0 | Status: SHIPPED | OUTPATIENT
Start: 2023-04-15 | End: 2023-04-22

## 2023-04-15 RX ORDER — ACETAMINOPHEN 500 MG
1000 TABLET ORAL EVERY 8 HOURS PRN
Qty: 21 TABLET | Refills: 0 | Status: SHIPPED | OUTPATIENT
Start: 2023-04-15 | End: 2023-04-22

## 2023-04-15 RX ORDER — LIDOCAINE HYDROCHLORIDE 10 MG/ML
20 INJECTION, SOLUTION INFILTRATION; PERINEURAL ONCE
Status: COMPLETED | OUTPATIENT
Start: 2023-04-15 | End: 2023-04-15

## 2023-04-15 RX ORDER — METHOCARBAMOL 750 MG/1
750 TABLET, FILM COATED ORAL EVERY 6 HOURS PRN
Qty: 40 TABLET | Refills: 0 | Status: SHIPPED | OUTPATIENT
Start: 2023-04-15 | End: 2023-04-25

## 2023-04-15 RX ADMIN — KETOROLAC TROMETHAMINE 30 MG: 30 INJECTION, SOLUTION INTRAMUSCULAR; INTRAVENOUS at 05:29

## 2023-04-15 RX ADMIN — LIDOCAINE HYDROCHLORIDE 20 ML: 10 INJECTION, SOLUTION INFILTRATION; PERINEURAL at 06:42

## 2023-04-15 ASSESSMENT — PAIN SCALES - GENERAL
PAINLEVEL_OUTOF10: 10
PAINLEVEL_OUTOF10: 10

## 2023-04-15 ASSESSMENT — PAIN DESCRIPTION - PAIN TYPE: TYPE: ACUTE PAIN

## 2023-04-15 ASSESSMENT — PAIN DESCRIPTION - ORIENTATION: ORIENTATION: LEFT

## 2023-04-15 ASSESSMENT — ENCOUNTER SYMPTOMS
DIARRHEA: 0
VOMITING: 0
NAUSEA: 0
ABDOMINAL PAIN: 0

## 2023-04-15 ASSESSMENT — PAIN DESCRIPTION - LOCATION: LOCATION: WRIST

## 2023-04-15 NOTE — ED NOTES
Pt presents to ER with c/o of severe wrist pain. Pt states he tripped over the chain on the ground and fell on the fence, resulting in abrasion in rt palm and knee, and pain in Lt wrist. Pt has tenderness and swelling in lt wrist, which has been worsening over time since this midnight. Pt is Ox4&A, denies any other health issue. Continue with plan of care.      JUAN MIGUEL Lagunas  04/15/23 5936

## 2023-04-15 NOTE — CONSULTS
radius ORIF. All questions answered appropriately. Surgical risks including but not limited to: bleeding, blood clots, infection, damage to surrounding tissues/nerves/vessels, failure of fixation, failure of wounds to heal, loss of motion, scar formation, patient dissatisfaction, residual pain, stiffness, dislocation, postoperative pain, recurrence of symptoms, need for future surgery,  risks of anesthesia, loss of limb and loss of life were all discussed with the patient. Knowing these risks, the patient wishes to proceed with surgery. - We will have the patient follow up outpatient for surgical planning.  -treatment/procedure: Hematoma block with reduction see procedure note below.  -Sugar-tong splint applied to the left upper extremity  -WB status: NWB to the LUE  -abx/tetanus: Ancef OCTOR  -DVT ppx: none.  -Pain control per ED  -Ice and elevate extremity for pain and swelling  - Dispo per ED  - Follow up tomorrow 4/16 in Dr. Edgar Mejia clinic.   -Please contact ortho with any questions    Procedure Note: Hematoma block: Risks, benefits, and alternatives were discussed with the patient, and verbal consent was obtained for hematoma block. 10 cc of 1% plain lidocaine was injected into the left distal radius fracture. Once adequate pain control had been achieved, reduction maneuver was performed and fragment position was confirmed on X-ray. A sugar-tong splint was applied, and fragment position was again confirmed on X-ray. The patient noticed decrease in pain and denied numbness or tingling in his distal digits. Jovita Benjamin MD  Resident Physician, PGY-1   Orthopaedic Surgery  6:18 AM 4/15/2023    This note is created with the assistance of a speech recognition program. While intending to generate a document that actually reflects the content of the visit, the document can still have some errors including those of syntax and sound a like substitutions which may escape proof reading.   In such instances,

## 2023-04-15 NOTE — ED NOTES
Orthopedic at bedside to reduce and apply soft cast on pt         [facility], RN  04/15/23 1775

## 2023-04-15 NOTE — ED PROVIDER NOTES
[facility]  Emergency Department  Emergency Medicine Resident Turn-Over     Care of González Bryant was assumed from Dr. Gema Jeff and is being seen for Wrist Pain  . The patient's initial evaluation and plan have been discussed with the prior provider who initially evaluated the patient. EMERGENCY DEPARTMENT COURSE / MEDICAL DECISION MAKING:       MEDICATIONS GIVEN:  Orders Placed This Encounter   Medications    ketorolac (TORADOL) injection 30 mg    lidocaine 1 % injection 20 mL    DISCONTD: ceFAZolin (ANCEF) 2000 mg in sterile water 20 mL IV syringe     Order Specific Question:   Antimicrobial Indications     Answer:   Surgical Prophylaxis    DISCONTD: ceFAZolin (ANCEF) 2000 mg in sterile water 20 mL IV syringe     Order Specific Question:   Antimicrobial Indications     Answer:   Surgical Prophylaxis    oxyCODONE (ROXICODONE) 5 MG immediate release tablet     Sig: Take 1 tablet by mouth every 8 hours as needed for Pain for up to 5 doses. Intended supply: 3 days. Take lowest dose possible to manage pain Max Daily Amount: 15 mg     Dispense:  5 tablet     Refill:  0    acetaminophen (TYLENOL) 500 MG tablet     Sig: Take 2 tablets by mouth every 8 hours as needed for Pain     Dispense:  21 tablet     Refill:  0    ibuprofen (ADVIL;MOTRIN) 800 MG tablet     Sig: Take 1 tablet by mouth every 8 hours as needed for Pain     Dispense:  21 tablet     Refill:  0    methocarbamol (ROBAXIN-750) 750 MG tablet     Sig: Take 1 tablet by mouth every 6 hours as needed (pain)     Dispense:  40 tablet     Refill:  0       LABS / RADIOLOGY:     Labs Reviewed - No data to display    XR RADIUS ULNA LEFT (2 VIEWS)    Result Date: 4/15/2023  Acute traumatic comminuted intra-articular fracture of the distal left radius with mild volar displacement and volar angulation. No radiocarpal dislocation. Proximal radius and ulna are intact. Normal alignment.      XR WRIST LEFT (MIN 3 VIEWS)    Result Date:
[facility]     Emergency Department     Faculty Attestation    I performed a history and physical examination of the patient and discussed management with the resident. I have reviewed and agree with the residents findings including all diagnostic interpretations, and treatment plans as written. Any areas of disagreement are noted on the chart. I was personally present for the key portions of any procedures. I have documented in the chart those procedures where I was not present during the key portions. I have reviewed the emergency nurses triage note. I agree with the chief complaint, past medical history, past surgical history, allergies, medications, social and family history as documented unless otherwise noted below. Documentation of the HPI, Physical Exam and Medical Decision Making performed by scribbruce is based on my personal performance of the HPI, PE and MDM. For Physician Assistant/ Nurse Practitioner cases/documentation I have personally evaluated this patient and have completed at least one if not all key elements of the E/M (history, physical exam, and MDM). Additional findings are as noted. Note Started: 5:32 AM EDT     49 yo M reports falling on L wrist around 2400, L wrist pain, dT current,   No other injury  PE gcs 15 swelling tenderness L ulnar wrist, decrease rom L forearm L wrist, nv intact, full extension L elbow,     Xr L wrist radial fx  Ortho at bedside / care turned over to day shift    EKG Interpretation    Interpreted by me      CRITICAL CARE: There was a high probability of clinically significant/life threatening deterioration in this patient's condition which required my urgent intervention. Total critical care time was 0 minutes. This excludes any time for separately reportable procedures.        [place],   04/15/23 [facility]  04/15/23 2238
[facility] ED  Emergency Department  Faculty Sign-Out Addendum     Care of Syeda Mcghee was assumed from previous attending and is being seen for Wrist Pain  . The patient's initial evaluation and plan have been discussed with the prior provider who initially evaluated the patient. Handoff taken on the following patient from prior Attending Physician:    Catherine Somers    I was available and discussed any additional care issues that arose and coordinated the management plans with the resident(s) caring for the patient during my duty period. Any areas of disagreement with residents documentation of care or procedures are noted on the chart. I was personally present for the key portions of any/all procedures during my duty period. I have documented in the chart those procedures where I was not present during the key portions. EMERGENCY DEPARTMENT COURSE / MEDICAL DECISION MAKING:       MEDICATIONS GIVEN:  Orders Placed This Encounter   Medications    ketorolac (TORADOL) injection 30 mg    lidocaine 1 % injection 20 mL       LABS / RADIOLOGY:     Labs Reviewed - No data to display    XR RADIUS ULNA LEFT (2 VIEWS)    Result Date: 4/15/2023  Acute traumatic comminuted intra-articular fracture of the distal left radius with mild volar displacement and volar angulation. No radiocarpal dislocation. Proximal radius and ulna are intact. Normal alignment. XR WRIST LEFT (MIN 3 VIEWS)    Result Date: 4/15/2023  Acute traumatic comminuted intra-articular fracture of the distal left radius with mild volar displacement and volar angulation. No radiocarpal dislocation. Proximal radius and ulna are intact. Normal alignment. RECENT VITALS:     Temp: 97.4 °F (36.3 °C),  Heart Rate: (!) 120, Resp: 16, BP: 118/78, SpO2: 94 %    This patient is a 50 y.o. Male with wrist pain. Comminuted intra-articular fracture. Ortho consult.     OUTSTANDING TASKS / RECOMMENDATIONS:    Follow-up in Ortho
pain to the left wrist with decreased range of motion secondary to pain. Patient also has abrasions to the right hand and knee. Left hand is neurovascularly intact. Patient states he is up-to-date on tetanus, last received last year. Plan to image the left wrist and radius ulna, treat with Toradol. Amount and/or Complexity of Data Reviewed  Radiology: ordered. Decision-making details documented in ED Course. Risk  Prescription drug management. EKG      All EKG's are interpreted by the Emergency Department Physician who either signs or Co-signs this chart in the absence of a cardiologist.    EMERGENCY DEPARTMENT COURSE:      ED Course as of 04/15/23 0740   Sat Apr 15, 2023   7959 Fall distal radius fx L displaced. Splinted and reduced. May go to OR or do OP. Awaiting final dispo [ZE]   9857 Patient signed out to daytime resident [AK]      ED Course User Index  [AK] Denise Malave MD  [ZE] Gulshan Lorenzana DO       PROCEDURES:      CONSULTS:  IP CONSULT TO ORTHOPEDIC SURGERY    CRITICAL CARE:  There was significant risk of life threatening deterioration of patient's condition requiring my direct management. Critical care time 0 minutes, excluding any documented procedures. FINAL IMPRESSION      1. Closed fracture of distal end of left radius, unspecified fracture morphology, initial encounter          DISPOSITION / PLAN     DISPOSITION        PATIENT REFERRED TO:  No follow-up provider specified.     DISCHARGE MEDICATIONS:  New Prescriptions    No medications on file       Denise Malave MD  Emergency Medicine Resident    (Please note that portions of thisnote were completed with a voice recognition program.  Efforts were made to edit the dictations but occasionally words are mis-transcribed.)       Denise Malave MD  Resident  04/15/23 1856

## 2023-04-15 NOTE — DISCHARGE INSTRUCTIONS
Orthopaedic Instructions:  -Weight bearing status: Non weight bearing with the left arm  -Do not remove dressings or splint until your post-operative follow up date. It is important that you do not get your splint wet. To avoid this and still maintain proper hygiene, you can wrap a garbage/plastic bag (or similar waterproof material) about the splinted arm/leg and secure it with tape while showering. One should still attempt to keep splint out of water with this method. If your splint were to fall off, it is important that you do not attempt to put it back on. Instead, return to the orthopaedic clinic for reapplication (see number below to call). -Always look for signs of compartment syndrome: pain out of proportion to the injury, pain not controlled with pain medication, numbness in digits, changing of color of digits (paleness). If these signs occur return to ED immediately for reassessment.  -Always work on finger motion (to non-injured fingers) to decrease swelling.  -Ice (20 minutes on and off 1 hour) and elevate above the level of the heart to reduce swelling and throbbing pain.  -Take medications as prescribed.  -Wean off narcotics (percocet/norco) as soon as possible. Do not take tylenol if still taking narcotics.  -Follow up with  Dr. Rosana Larson  in his office tomorrow. Call at 8 am to schedule appointment [phone]. Walter Zee MD  PGY-1 Resident Physician  [id number]  [facility]  4/15/2023 at 9:32 AM

## 2023-04-17 ENCOUNTER — OFFICE VISIT (OUTPATIENT)
Dept: ORTHOPEDIC SURGERY | Age: 48
End: 2023-04-17

## 2023-04-17 VITALS — BODY MASS INDEX: 27.34 KG/M2 | WEIGHT: 213 LBS | HEIGHT: 74 IN

## 2023-04-17 DIAGNOSIS — S62.102A LEFT WRIST FRACTURE, CLOSED, INITIAL ENCOUNTER: Primary | ICD-10-CM

## 2023-04-17 DIAGNOSIS — F17.200 SMOKER: ICD-10-CM

## 2023-04-17 NOTE — PROGRESS NOTES
a comminuted intra-articular fracture of the left distal radius. ASSESSMENT:  38-year-old male with left distal radius fracture. PLAN:     The patient was evaluated and after discussion surgical and non surgical options, the patient has decided to undergo left wrist ORIF. Discussed that given the amount of comminution there is a concern for loss of reduction over time. Discussed that with operative intervention patient will have use of wrist in 2 weeks time for ROM. Consent obtained and in chart. All questions answered appropriately. Surgical risks including but not limited to: bleeding, blood clots, infection, damage to surrounding tissues/nerves/vessels, failure of fixation, failure of wounds to heal, loss of motion, scar formation, patient dissatisfaction, residual pain, stiffness, dislocation, postoperative pain, recurrence of symptoms, need for future surgery,  risks of anesthesia, loss of limb and loss of life were all discussed with the patient. Knowing these risks, the patient wishes to proceed with surgery. Patient will be placed into a splint postoperatively and will be nonweightbearing for 2 weeks. In 2 weeks we will have him visit for a wound check and removal of splint. We will obtain new x-rays and evaluate his healing process. Patient understands the current plan and is in agreement. Significant time today was spent with the patient regarding tobacco cessation (5 min). We did advise him that in order to optimize his wound healing as well as bony healing he should try to minimize his tobacco use.        Jere Alexis DO  7:47 PM 4/17/2023

## 2023-05-18 ENCOUNTER — APPOINTMENT (OUTPATIENT)
Dept: GENERAL RADIOLOGY | Age: 48
End: 2023-05-18
Payer: MEDICAID

## 2023-05-18 ENCOUNTER — HOSPITAL ENCOUNTER (EMERGENCY)
Age: 48
Discharge: HOME | End: 2023-05-18
Attending: EMERGENCY MEDICINE
Payer: MEDICAID

## 2023-05-18 VITALS
SYSTOLIC BLOOD PRESSURE: 146 MMHG | DIASTOLIC BLOOD PRESSURE: 84 MMHG | TEMPERATURE: 98.2 F | RESPIRATION RATE: 16 BRPM | HEART RATE: 100 BPM | OXYGEN SATURATION: 100 %

## 2023-05-18 DIAGNOSIS — S52.125D CLOSED NONDISPLACED FRACTURE OF HEAD OF LEFT RADIUS WITH ROUTINE HEALING, SUBSEQUENT ENCOUNTER: Primary | ICD-10-CM

## 2023-05-18 PROCEDURE — 29125 APPL SHORT ARM SPLINT STATIC: CPT

## 2023-05-18 PROCEDURE — 73110 X-RAY EXAM OF WRIST: CPT

## 2023-05-18 PROCEDURE — 99283 EMERGENCY DEPT VISIT LOW MDM: CPT

## 2023-05-18 RX ORDER — ACETAMINOPHEN 500 MG
1000 TABLET ORAL 3 TIMES DAILY PRN
Qty: 42 TABLET | Refills: 0 | Status: CANCELLED | OUTPATIENT
Start: 2023-05-18 | End: 2023-05-28

## 2023-05-18 RX ORDER — IBUPROFEN 600 MG/1
600 TABLET ORAL 4 TIMES DAILY PRN
Qty: 28 TABLET | Refills: 0 | Status: CANCELLED | OUTPATIENT
Start: 2023-05-18 | End: 2023-05-25

## 2023-05-18 RX ORDER — IBUPROFEN 800 MG/1
800 TABLET ORAL ONCE
Status: DISCONTINUED | OUTPATIENT
Start: 2023-05-18 | End: 2023-05-18 | Stop reason: HOSPADM

## 2023-05-18 ASSESSMENT — PAIN DESCRIPTION - DESCRIPTORS: DESCRIPTORS: ACHING

## 2023-05-18 ASSESSMENT — ENCOUNTER SYMPTOMS
VOMITING: 0
BACK PAIN: 0
RHINORRHEA: 0
ABDOMINAL PAIN: 0

## 2023-05-18 ASSESSMENT — PAIN - FUNCTIONAL ASSESSMENT: PAIN_FUNCTIONAL_ASSESSMENT: 0-10

## 2023-05-18 ASSESSMENT — PAIN SCALES - GENERAL: PAINLEVEL_OUTOF10: 8

## 2023-05-18 ASSESSMENT — PAIN DESCRIPTION - ORIENTATION: ORIENTATION: LEFT

## 2023-05-18 ASSESSMENT — PAIN DESCRIPTION - LOCATION: LOCATION: WRIST

## 2023-05-18 NOTE — DISCHARGE INSTRUCTIONS
You were seen in the emergency room today for persistent pain in the left wrist.  Please follow-up with orthopedic surgery clinic as soon as possible, this very morning to help with healing. You will likely need surgery scheduled. If you have any new or worsening symptoms, please return the ED for reevaluation. Thank you for visiting [facility] Emergency Department. You need to call No primary care provider on file. to make an appointment as directed for follow up. Should you have any questions regarding your care or further treatment, please call [facility] at [phone]. Take any medications as prescribed, if given any, otherwise for pain Use ibuprofen or Tylenol (unless prescribed medications that have Tylenol in it). You can take over the counter Ibuprofen (advil) tablets (4 tablets every 8 hours or 3 tablets every 6 hours or 2 tablets every 4 hours)    If given narcotics during this ED visit, please do not drive or operate heavy machinery for at least 4-6 hours. PLEASE RETURN TO THE ED IMMEDIATELY for worsening symptoms, or if you develop any concerning symptoms such as: high fever not relieved by tylenol and/or motrin, chills, shortness of breath, chest pain, persistent nausea and/or vomiting, numbness, weakness or tingling in the arms or legs or change in color of the extremities, changes in mental status, persistent headache, blurry vision, inability to urinate, unable to follow up with your physician, or other any other  Care or concern.

## 2023-05-18 NOTE — ED PROVIDER NOTES
[facility]  Emergency Department  Faculty Attestation     I performed a history and physical examination of the patient and discussed management with the resident. I reviewed the residents note and agree with the documented findings and plan of care. Any areas of disagreement are noted on the chart. I was personally present for the key portions of any procedures. I have documented in the chart those procedures where I was not present during the key portions. I have reviewed the emergency nurses triage note. I agree with the chief complaint, past medical history, past surgical history, allergies, medications, social and family history as documented unless otherwise noted below. For Physician Assistant/ Nurse Practitioner cases/documentation I have personally evaluated this patient and have completed at least one if not all key elements of the E/M (history, physical exam, and MDM). Additional findings are as noted. Preliminary note started at 7:08 AM EDT    Primary Care Physician:  No primary care provider on file. Screenings:  [unfilled]    CHIEF COMPLAINT       Chief Complaint   Patient presents with    Wrist Pain       RECENT VITALS:   BP (!) 146/84   Pulse 100   Temp 98.2 °F (36.8 °C) (Oral)   Resp 16   SpO2 100%     LABS:  Labs Reviewed - No data to display    Radiology  XR WRIST LEFT (MIN 3 VIEWS)    (Results Pending)       CRITICAL CARE: There was a high probability of clinically significant/life threatening deterioration in this patient's condition which required my urgent intervention. Total critical care time was none minutes. This excludes any time for separately reportable procedures. EKG:      Attending Physician Additional  Notes    Patient had significant pain from his cast for his recent wrist fracture of April 2023. He had swelling into his hand. He took off the cast and presented here for evaluation.   He was told he should undergo surgery

## 2023-05-18 NOTE — ED PROVIDER NOTES
[facility]  ED  Emergency Department Encounter  Emergency Medicine Resident     Pt Name:Dayo Howard  MRN: [id number]  [place] 1975  Date of evaluation: 5/18/23  PCP:  No primary care provider on file. Note Started: 6:38 AM EDT      CHIEF COMPLAINT       Chief Complaint   Patient presents with    Wrist Pain       HISTORY OF PRESENT ILLNESS  (Location/Symptom, Timing/Onset, Context/Setting, Quality, Duration, Modifying Factors, Severity.)      Wilson Briceño is a 50 y.o. male who presents with left wrist pain. Patient fractured his distal radius on 4/15. He followed up with Ortho and decided nonoperative route. He was postop splint on for 6 weeks. He states the splint and the entire forearm became very painful so he had removed it within the last day. He is now complaining of increased pain at the wrist, and also noticed swelling in the hand prior to this happening. No weakness or numbness in hand. States pain is worst with movement. No new injury    PAST MEDICAL / SURGICAL / SOCIAL / FAMILY HISTORY      has a past medical history of Acute renal failure (ARF) ([address] Bipolar 1 disorder ([address] H/O alcohol abuse, Hemodialysis patient ([facility] [address] Left wrist fracture, Osteoarthritis of both knees, Poor historian, Schizoaffective disorder ([address] and Wellness examination. has a past surgical history that includes Tonsillectomy; IR NONTUNNELED VASCULAR CATHETER > 5 YEARS (08/25/2020); IR BIOPSY KIDNEY PERCUTANEOUS (08/28/2020); and IR TUNNELED CVC PLACE WO SQ PORT/PUMP > 5 YEARS (08/31/2020).       Social History     Socioeconomic History    Marital status: Single     Spouse name: Not on file    Number of children: Not on file    Years of education: Not on file    Highest education level: Not on file   Occupational History    Not on file   Tobacco Use    Smoking status: Every Day     Packs/day: 1.00     Years: 38.00     Pack years: 38.00     Types: Cigarettes     Start date: 4/19/1985

## 2023-05-18 NOTE — ED NOTES
Pt. Resting on stretcher, eyes open, RR even and non-labored  Pt. Updated on POC  Will continue to monitor   Pt. Refusing ibuprofen, states he is able to tolerate his pain  Advised to let writer know if pt.   Changes mind       Divina Grant RN  05/18/23 [id number]

## 2023-05-18 NOTE — ED NOTES
Pt. Resting on stretcher, eyes open, RR even and non-labored  Pt.  Updated on POC  Will continue to monitor        Rosario Neumann RN  05/18/23 [id number]

## 2023-05-18 NOTE — ED NOTES
Dr. Lola Oneil at bedside      [facility], [facility]  05/18/23 [phone]

## 2023-05-18 NOTE — ED NOTES
Pt. Ambulates to bathroom with steady gait   Updated on POC, awaiting ortho  Verbalizes understanding        Eduardo Corea RN  05/18/23 [id number]

## 2023-05-18 NOTE — ED NOTES
Pt to ED via private auto with c/o lt wrist/hand pain. Pt previously broke is lt radial on April 15 and was told to follow up with ortho and dc with splint in place. Pt never followed up and states the splint was causing pain and swelling so he took it off and the pain is now unbearable.       Aga Odonnell RN  05/18/23 [id number]

## 2023-05-22 ENCOUNTER — TELEPHONE (OUTPATIENT)
Dept: ORTHOPEDIC SURGERY | Age: 48
End: 2023-05-22

## 2023-05-22 NOTE — TELEPHONE ENCOUNTER
Patient called in requesting a letter stating he has been out of work since April 15th. Patient was in the ED this past Saturday and states he cannot return to work with his wrist the way it is please advise thank you!

## 2023-05-23 NOTE — TELEPHONE ENCOUNTER
Called patient, phone goes straight to . Patient needs to be seen prior to any work note can be provided.

## 2023-07-18 NOTE — PROGRESS NOTES
HISTORY OF PRESENT ILLNESS     HPI    This is a 68 year old male who presents today for follow up of his chronic medical issues.    He has diabetes mellitus type 2 which is under good control based on his A1C and continuous glucose monitor. He is taking Toujeo 120 units twice daily and Novolog sliding scale with meals and sometimes at night as follows:  30 units, 151-200 35 units, 201-250 40 units, 251-300 45 units, >300 50 units. He is typically taking 30-40 units but only twice per day as his sugars have been down. He rarely has lows.    Lab Results   Component Value Date    HGBA1C 6.6 (H) 07/13/2023    HGBA1C 7.5 (H) 04/12/2023    HGBA1C 7.9 (H) 01/13/2023    HGBA1C 7.6 (H) 07/14/2022       He has a Dexcom G6 continuous glucose monitor but could not log into the software today so that I could review his readings. His average has reportedly bene in the 130s overall.    His last dilated eye exam at [facility] showed mild nonproliferative retinopathy present. He underwent left cataract extraction on 12/17/2020 and right cataract extraction on 1/7/2021.    He has peripheral neuropathy in his feet with some chronic pain in his feet.    His last urine testing revealed minimal microalbuminuria and he has chronic kidney disease. The level depends on the use of torsemide (using daily) and metolazone (takes only 2 days at a time and has not used for quite awhile). He is on lisinopril for renal protection. His most recent testing was stage 2 and stable.    Lab Results   Component Value Date    MALBCR 46.4 (H) 04/14/2023    BUN 32 (H) 07/13/2023    BUN 26 (H) 04/12/2023    BUN 30 (H) 01/13/2023    BUN 16 09/16/2022    CREATININE 1.14 07/13/2023    CREATININE 1.21 (H) 04/12/2023    CREATININE 1.08 01/13/2023    CREATININE 0.95 09/16/2022    GFRESTIMATE 69 07/13/2023        He has mild secondary hyperparathyroidism due to chronic kidney disease with observation advised for now. His last testing was somewhat worse.    Lab  Patient to IR for kidney biopsy. Results   Component Value Date    INTAC 116 (H) 04/12/2023    INTAC 99 (H) 07/20/2021    VITD25 39.9 07/20/2021    PHOS 3.7 04/12/2023    PHOS 4.1 07/20/2021       He has hypertension which is under good control today with lisinopril 20 mg daily, nifedipine XL 60 mg daily, bisoprolol 5 mg daily, doxazosin 2 mg daily, torsemide 20 mg daily, and metolazone 5 mg daily as needed. He has not checked it himself recently.    Blood pressure 120/74, pulse 64, resp. rate 18, height 6' (1.829 m), weight (!) 181.9 kg (401 lb 1.6 oz), SpO2 95 %.    He has chronic leg swelling for which compression stockings were advised but not covered unless he develops leg sores. He takes torsemide 20 mg daily (uses most days) as well as metolazone 5 mg daily as needed for when his swelling is more severe, typically once or twice a month, but experiences leg cramps with this so he doesn't like to use it. He is not on potassium supplementation as his potassium level actually tends to be high. His last level was ok. His leg swelling has been only mild lately.    Lab Results   Component Value Date    NTPROB 371 (H) 11/08/2021    NTPROB 28 12/04/2019     Lab Results   Component Value Date    POTASSIUM 4.5 07/13/2023    POTASSIUM 3.9 04/12/2023    POTASSIUM 4.2 01/13/2023       He has hyperlipidemia and his last lipid testing revealed HDL low otherwise lipids under acceptable control with atorvastatin 80 mg daily and fish oil daily.    Lab Results   Component Value Date    CHOLESTEROL 102 04/14/2022    CHOLESTEROL 113 12/31/2020    HDL 26 (L) 04/14/2022    HDL 30 (L) 12/31/2020    CALCLDL 58 04/14/2022    CALCLDL 64 12/31/2020    TRIGLYCERIDE 91 04/14/2022    TRIGLYCERIDE 94 12/31/2020       He has non-obstructive coronary artery disease. Coronary angiogram on 10/15/2018 showed mild coronary artery disease with nothing flow-limiting (proximal LAD lesion with 25% stenosis, proximal LAD to mid LAD lesion with 10% stenosis, mid circumflex to distal  circumflex lesion with 10% stenosis in ostial 3rd marginal to 3rd marginal, mid RCA to distal RCA lesion with 10% stenosis) with the stress test that preceded this felt to be a false positive and chest pain at the time felt to be non-cardiac in origin. He takes aspirin 81 mg daily and the medications noted above. He notes no recent exertional chest pain or palpitations but is winded with exertion.    He has chronic exertional dyspnea. Echocardiogram on 11/8/2019 showed normal chamber sizes, normal left and right ventricular systolic function without regional wall motion abnormalities, ejection fraction of 65%, and normal left ventricular filling pressure. No valvular abnormalities were noted but the aortic and pulmonic valves were not well visualized. CT angiogram of the chest on 11/8/2021 showed no pulmonary embolism with mild patchy bibasilar dependent atelectasis noted. Obesity and deconditioning were again felt to be the primary cause but pulmonary function testing on 2/3/2022 also showed mild chronic obstructive pulmonary disease and a trial of Anoro Ellipta 1 puff daily was advised with minimal benefit. Trelegy Ellipta 1 puff daily was prescribed in place of this and slightly helpful. He remains winded with even short distances. He has not used albuterol recently.    He has obstructive sleep apnea and states compliance with CPAP nightly.    He is morbidly obese and was enrolled in the bariatric clinic and was doing great but he got irritated with the process and decided not to continue. His peak weight is 412 lbs. His weight is down some from his last visit.    Wt Readings from Last 4 Encounters:   07/18/23 (!) 181.9 kg (401 lb 1.6 oz)   04/18/23 (!) 187.1 kg (412 lb 6.4 oz)   01/18/23 (!) 185 kg (407 lb 12.8 oz)   09/22/22 (!) 183.7 kg (405 lb)       He has gastroesophageal reflux disease for which he is prescribed omeprazole 20 mg daily with good results. He is symptomatic without it.    He has depression  with the primary manifestation being a lack of motivation. He was prescribed paroxetine 40 mg daily for this with some benefit, however, at his visit on 1/18/2023 he noted that he continued to lack motivation despite his mood being ok. I opted to transition him from paroxetine to bupropion ER to help with his motivation. He is currently off of paroxetine and taking Wellbutrin  mg daily. This seems to help some.    He has chronic low back pain for which he sees a chiropractor. Lumbar MRI on 2/9/2022 showed multilevel degenerative disc disease in the lumbar spine without acute bony abnormality. The changes were most marked at L1-2 and L3-4 where there was moderate spinal stenosis. He had a lumbar epidural steroid injection on 6/23/2022 without benefit and very significant pain which is quite limiting. He underwent Vertiflex implantation at L3-L4 and L4-L5 on 9/16/2022. This helped, but he still has back pain.    He has been dealing with right mid thoracic back pain as well. MRI of the thoracic spine on 2/9/2022 showed degenerative changes thoracic spine without acute bony abnormality or high-grade canal stenosis, left T10-T11 foraminal stenosis should be correlated with radicular symptoms, and increased T2 signal in the T8-T9 interspace of unclear significance, but most likely degenerative. T10-T11 epidural steroid injection was done on 3/1/2022 by pain management. This helped with his mid back pain and radicular rib pain.    He complained of intermittent pain in his great toes suggestive of gout on 9/6/2019. Uric acid was noted to be elevated at that time and he was placed on indomethacin as needed with benefit. Colchicine was also prescribed on 7/13/2022. Allopurinol 300 mg daily was added on 8/31/2022 with significant improvement in his uric acid level, although still above goal. He notes no recent flare ups.    Lab Results   Component Value Date    URIC 7.1 01/13/2023    URIC 10.2 (H) 04/14/2022       He has  dealt with pain in both feet as noted in my prior notes with advance degenerative changes of the first MTP joints noted on x-rays. Gabapentin helps some. He typically only uses it once per day.    He has osteoarthritis of multiple joints with chronic pain in his hips and knees. Both knees have been replaced. He is also status post left posterior total hip arthroplasty on 7/29/2021 and right total hip arthroplasty on 11/2/2021. He is dealing with left hip pain currently with pain radiating down his left left, which sometimes gives out.    He developed significant joint aching after his hip replacements, primarily in the muscles around the hips, but he also had pain in his hands and feet. Labs were ordered by orthopedic surgery on 11/8/2021 which included a D-dimer (mildly elevated but expected post surgery). Sed rate and CRP were mildly elevated. Lyme titer was negative. Rheumatoid factor and CCP antibody were negative. RAHUL titer was negative. RAHUL was positive due to a mildly elevate RNP antibody. This was slightly elevated 11/1/2013 as well. The significance of this is unclear. Gabapentin has not really helped with this pain. He has requested to see rheumatology but has yet to establish. A trial of prednisone on 1/17/2022 was of no benefit.    For his chronic pain he is using ibuprofen 4 tablets as needed. This helps but only lasts for about 2 hours. He takes oxycodone about once per week if his pain is really bad. Tylenol has been of no benefit. He is only taking gabapentin in the morning.    He had a basal cell carcinoma excised from the left nose on 12/20/2021 with wider excision on 2/24/2022.    At his visit on 1/18/2023 he noted a constant buzzing in his head over the prior month. This did not seem to vary with his pulse. He couldn't attribute this to any medications specifically. He denied associated hearing, dizziness, headaches, or other associated symptoms. I advised observation since he had no other  associated symptoms with neuroimaging an option if this changed. Today this is no worse or better and he notes no other associated symptoms.    PREVENTIVE HEALTH:    Diet:  wife likes to cook, cut back on carbohydrates and weight is down some  Exercise:  water exercises at the [employer] 2-3 days per week    Last lipid testing:  see above    Last glucose:  see above    Last PSA:  normal    Lab Results   Component Value Date    PSA 1.04 2022    PSA 0.95 2020    PSA 0.82 2019       Health Maintenance:  Colonoscopy:  2021. 2 poylps, internal hemorrhoids were noted and follow up was recommended in 3 years     Screening for abdominal aortic aneurysm:  not indicated    PAST MEDICAL, FAMILY AND SOCIAL HISTORY     I have reviewed the patient's medications and allergies, past medical, surgical, social and family history, updating these as appropriate.  See Histories section of the EMR for a display of this information.    REVIEW OF SYSTEMS     Review of Systems   All other systems reviewed and are negative.    PHYSICAL EXAM     Physical Exam  Vitals and nursing note reviewed.   Constitutional:       General: He is not in acute distress.     Appearance: Normal appearance. He is well-developed.   HENT:      Head: Normocephalic and atraumatic.   Eyes:      General: No scleral icterus.  Neck:      Vascular: No carotid bruit or JVD.   Cardiovascular:      Rate and Rhythm: Normal rate and regular rhythm.      Heart sounds: Normal heart sounds. No murmur heard.     No friction rub. No gallop.   Pulmonary:      Effort: Pulmonary effort is normal.      Breath sounds: Normal breath sounds. No wheezing or rales.   Abdominal:      Palpations: Abdomen is soft.      Tenderness: There is no abdominal tenderness.   Musculoskeletal:      Right lower le+ Pitting Edema present.      Left lower le+ Pitting Edema present.   Skin:     General: Skin is warm and dry.      Coloration: Skin is not jaundiced.    Neurological:      Mental Status: He is alert. He is not disoriented.       Diabetic Foot Exam Documentation     Abnormal Bilateral Foot Exam:  Right: Skin integrity is negative for erythema, blisters and ulcers, and positive for callosities and onychomycosis of the toenails. Dorsalis pedis and posterior tibial pulses are present. Pressure sensation using the [place] is absent in the distal foot.    Left: Skin integrity is negative for erythema, blisters and ulcers, and positive for callosities and onychomycosis of the toenails. Dorsalis pedis and posterior tibial pulses are present. Pressure sensation using the [place]anna is absent in the distal foot.       ASSESSMENT/PLAN     ASSESSMENT:  1. Type 2 diabetes mellitus with diabetic polyneuropathy, with long-term current use of insulin (CMD)    2. Essential hypertension    3. Pure hypercholesterolemia    4. Stage 3a chronic kidney disease (CMD)    5. Coronary artery disease, non-occlusive    6. Gastroesophageal reflux disease, unspecified whether esophagitis present    7. Obstructive sleep apnea on CPAP    8. Recurrent major depressive disorder, in partial remission (CMD)    9. Morbid obesity with BMI of 50.0-59.9, adult (CMD)    10. Chronic obstructive pulmonary disease, unspecified COPD type (CMD)    11. Chronic left hip pain    12. Chronic left-sided low back pain with left-sided sciatica        PLAN:  Orders Placed This Encounter   • Comprehensive Metabolic Panel   • Glycohemoglobin   • SERVICE TO ORTHOPEDICS   • SERVICE TO PAIN MANAGEMENT       Weight loss was again encouraged to help with his medical issues.    We discussed carbohydrate restriction to keep his A1C under 7.0.    I will consult orthopedic surgery and pain management again about his left lower back, hip, and leg pain.    His chronic issues are otherwise under acceptable control and I will make no other changes today.    I will check the labs listed above just prior to his next  visit.    He will follow up with his other providers as instructed.    Return in about 3 months (around 10/18/2023) for diabetes, non-fasting labs just prior to visit.

## 2023-08-09 ENCOUNTER — OFFICE VISIT (OUTPATIENT)
Dept: ORTHOPEDIC SURGERY | Age: 48
End: 2023-08-09

## 2023-08-09 VITALS — BODY MASS INDEX: 27.34 KG/M2 | WEIGHT: 213 LBS | HEIGHT: 74 IN

## 2023-08-09 DIAGNOSIS — M25.532 LEFT WRIST PAIN: ICD-10-CM

## 2023-08-09 DIAGNOSIS — S62.102A LEFT WRIST FRACTURE, CLOSED, INITIAL ENCOUNTER: Primary | ICD-10-CM

## 2023-08-09 NOTE — PROGRESS NOTES
today's visit were also independently interpreted. History: 47-year-old male with a left distal radius fracture    Comparison: May 18, 2023    Findings: 3 views of the left wrist (AP/lateral/oblique) in a skeletally mature patient showing healed left distal radius fracture with no significant loss of reduction when compared to prior films. Overall alignment well-maintained. Impression: Healed left distal radius fracture     Assessment:   46y.o. year old male with left distal radius fracture  Plan:   Lengthy discussion had with patient about current clinical state. Activities as tolerated left upper extremity. Given patient's weakness, prescription for physical therapy provided to work on range of motion and strengthening. Patient concerned about going back to work given the weakness, recommend being off work until September 25, 2023 at which time he may return without any restrictions. Notes provided. I will plan seeing patient back as needed. All questions answered. Patient is amenable to this plan. Electronically signed by Brandon Oquendo DO on 8/9/2023 at 4:13 PM    This note is created with the assistance of a speech recognition program.  While intending to generate a document that actually reflects the content of the visit, the document can still have some errors including those of syntax and sound a like substitutions which may escape proof reading.   In such instances, actual meaning can be extrapolated by contextual diversion

## 2023-08-15 ENCOUNTER — HOSPITAL ENCOUNTER (OUTPATIENT)
Dept: OCCUPATIONAL THERAPY | Age: 48
Setting detail: THERAPIES SERIES
Discharge: HOME | End: 2023-08-15
Payer: MEDICAID

## 2023-08-15 PROCEDURE — 97165 OT EVAL LOW COMPLEX 30 MIN: CPT

## 2023-08-15 NOTE — CONSULTS
[x] [facility]       Occupational Therapy 1st floor       [address], [place]         Phone: [phone]       Fax: [phone] [] [address] [facility] Occupational Therapy  [address]   [place], [place]  Phone: [phone]  Fax: [phone]       Occupational Therapy Hand & Upper Extremity  Initial Evaluation    Date: 8/15/2023      Patient: Trent Peres  : 1975  MRN: [id number]  Referring Provider:  Cynthia Valdes DO  Insurance: Other, [facility], 1 VISIT AUTHORIZED, [address] AFTER EVAL  Medical Diagnosis: M25.532 left wrist pain   Rehab Codes: pain in hand M79.646,, pain in wrist M25.53,, or parasthesia of skin R20.2,  Onset Date: 4/15/23    Next Dr. Rodolfo Blackwood: NA- return PRN    Subjective:   CC: Patient reports biggest complaint is pain in L wrist.   HPI: Patient referred to outpatient OT for left wrist pain sustained on 4/15/23 from a fall at standing height, opted for surgical intervention ORIF but failed to show. Patient was outside of an adult establishment, denies intoxication, LOC or hitting his head at the time of the fall. Patient presented to the ED where he was evaluated, reduced and placed in a sugar-tong splint. At most recent office visit on 23, patient is able to perform activities as tolerated with LUE, off work until 23 and may return without any restrictions at that time. Mechanism of Injury: fall  Surgery Date: NA  Precautions: None    Involved Extremity: Left   Dominant Extremity: Right    Past Medical History: Unremarkable and Refer to Epic          Comorbidities: NA    Medications: Refer to Medical chart in [facility]  Allergies:  Refer to Medical chart in [facility]    Pain: Intensity: 10 Location: L wrist     Pain Type:  Intermittant and with movement/activity  Pain Altered Tx: no  Action Taken:none            Home Environment:    Pt lives in a  and House- recovery  With 4+ and B Handrail RUPINDER  The washing machine is on and the lower level

## 2023-08-21 ENCOUNTER — TELEPHONE (OUTPATIENT)
Dept: ORTHOPEDIC SURGERY | Age: 48
End: 2023-08-21

## 2023-08-21 NOTE — PRE-CERTIFICATION NOTE
...       [x] [facility] ([facility]) - [facility] TWELVESTEP [facility] &  Therapy  [address]  [phone]  F: [phone] [] [address] [facility]  [address]   [address]  P: [phone]  F: [phone] [] [address]  [address]  P: [phone]  F: [phone] [] [address] [facility]   [address]   [place]: [phone]  F: [phone]  [] [address] [facility]   Outpatient Occupational Therapy  [address] [facility] [address]: [phone]  F: [phone]          Therapy Pre-certification Note      8/21/2023    Sunshine Mcmahan  1975   [id number]      Insurance approval was received for Physical Therapy from [facility] on 08/21/23. Approval was received for 12 visits, from 08/16/23 to 10/31/23. Authorization number [id number].     Patient was contacted to be scheduled and 08/24/23 @ 2:00pm.    [id number] Approved 48 Units  [id number] Approved 48 Units        Electronically signed by Cheryle Cisneros on 8/21/2023 at 4:10 PM

## 2023-08-21 NOTE — TELEPHONE ENCOUNTER
Notified patient to contact therapy regarding authorization, also patient will  hard copy of his paperwork from the office

## 2023-08-21 NOTE — TELEPHONE ENCOUNTER
Pt called in to get update on paperwork he dropped off for Dr. Alejandra Gibbons to sign for disability on his credit card. Advised it appeared Dr. Alejandra Gibbons signed the paperwork today and it looked like it was also faxed back today. Pt was just wondering what happened to the hard copy. Was also wanting to know about the status of the PT that we were working on getting approval from his insurance.       Please call pt with any questions or updates @ [phone]

## 2023-08-24 ENCOUNTER — HOSPITAL ENCOUNTER (OUTPATIENT)
Dept: OCCUPATIONAL THERAPY | Age: 48
Setting detail: THERAPIES SERIES
Discharge: HOME | End: 2023-08-24
Payer: MEDICAID

## 2023-08-24 PROCEDURE — 97110 THERAPEUTIC EXERCISES: CPT

## 2023-08-24 NOTE — FLOWSHEET NOTE
physical            Pt. Education:  [x] Yes  [] No  [] Reviewed Prior HEP/Ed - Pt eval only required Auth. Method of Education: [x] Verbal  [x] Demo  [x] Written  Re: AROM HEP and green putty   Comprehension of Education:  [x] Verbalizes understanding. [x] Demonstrates understanding. [x] Needs review. [] Demonstrates/verbalizes HEP/Ed previously given. Plan: [x] Continue current frequency toward short and long term goals.   [x] Specific Instructions for subsequent treatments: HEP   [] Other:       Time In: 2009  Time Out: 1450        Treatment Charges: Mins Units (BWC) Time In/Out:   []  Modalities:       []  Ultrasound      [x]  Ther Exercise 53 4    []  Manual Therapy      []  Ther Activities      []  Orthotic fit/train      []  Orthotic recheck      []  Other      Total Treatment time 53 4        Electronically signed by:  ALEX Cervantes

## 2023-08-28 ENCOUNTER — HOSPITAL ENCOUNTER (OUTPATIENT)
Dept: OCCUPATIONAL THERAPY | Age: 48
Setting detail: THERAPIES SERIES
Discharge: HOME | End: 2023-08-28
Payer: MEDICAID

## 2023-08-28 PROCEDURE — 97110 THERAPEUTIC EXERCISES: CPT

## 2023-08-28 NOTE — FLOWSHEET NOTE
[x] [address] [facility]       Occupational Therapy            1st floor       [address], [place]         Phone: [phone]       Fax: [phone] [] [address] [facility] Occupational Therapy  [facility] [place], [place]  Phone: [phone]  Fax: [phone]      Occupational Therapy Daily Treatment Note    Date:  2023  Patient Name:  Arabella Breen    :  1975  MRN: [id number]  Referring Provider:  Glenn Mata DO  Insurance: Riddhi Cardenas, 1 VISIT AUTHORIZED, [address] approval was received for Physical Therapy from [facility] on 23. Approval was received for 12 visits, from 23 to 10/31/23. Authorization number [id number].   [id number] Approved 48 Units  [id number] Approved 48 Units  Medical Diagnosis: U58.096 left wrist pain    Rehab Codes: pain in hand M79.646,, pain in wrist M25.53,, or parasthesia of skin R20.2,  Onset Date: 4/15/23                 Next Dr. Gagan Vigil: NA- return PRN  Visit# / total visits: 3/12; Progress note for patient due at visit 6    Cancels/No Shows: 0/0      Subjective:    Pain:  [] Yes  [x] No Location:  N/A Pain Rating: (0-10 scale) 0/10  Pain altered Tx:  [x] No  [] Yes  Action:  Pt Comments:  I did a lot of driving but no physical activity this weekend. Precautions: NONE  Surgery procedure and date: per eval Patient referred to outpatient OT for left wrist pain sustained on 4/15/23 from a fall at standing height, opted for surgical intervention ORIF but failed to show. Patient was outside of an adult establishment, denies intoxication, LOC or hitting his head at the time of the fall. Patient presented to the ED where he was evaluated, reduced and placed in a sugar-tong splint. At most recent office visit on 23, patient is able to perform activities as tolerated with LUE, off work until 23 and may return without any restrictions at that time. Objective:   Today's Treatment:  Modalities:  Exercises:  EXERCISE

## 2023-08-29 ENCOUNTER — HOSPITAL ENCOUNTER (OUTPATIENT)
Dept: OCCUPATIONAL THERAPY | Age: 48
Setting detail: THERAPIES SERIES
Discharge: HOME | End: 2023-08-29
Payer: MEDICAID

## 2023-08-29 PROCEDURE — 97110 THERAPEUTIC EXERCISES: CPT

## 2023-08-30 ENCOUNTER — HOSPITAL ENCOUNTER (OUTPATIENT)
Dept: OCCUPATIONAL THERAPY | Age: 48
Setting detail: THERAPIES SERIES
Discharge: HOME | End: 2023-08-30
Payer: MEDICAID

## 2023-08-30 PROCEDURE — 97110 THERAPEUTIC EXERCISES: CPT

## 2023-08-30 NOTE — FLOWSHEET NOTE
Goals: (  12  Treatments)  Decrease pain to 2/10 with active use   Increase L  strength to 45+ pounds and all L pinches by 2+ pounds for ease with work related tasks   Increase function:UE Functional Index Score 55/80 or more points to promote increased functional abilities     Patient Goals: strengthening, pass DOT physical            Pt. Education:  [] Yes  [x] No  [] Reviewed Prior HEP/Ed - Putty   Method of Education: [] Verbal  [x] Demo  [] Written  Re   Comprehension of Education:  [x] Verbalizes understanding. [x] Demonstrates understanding. [x] Needs review. [] Demonstrates/verbalizes HEP/Ed previously given. Plan: [x] Continue current frequency toward short and long term goals.   [] Specific Instructions for subsequent treatments:    [] Other:       Time In: 4913 Time Out: 2713        Treatment Charges: Mins Units (BWC) Time In/Out:   []  Modalities:       []  Ultrasound      [x]  Ther Exercise 49 3    []  Manual Therapy      []  Ther Activities      []  Orthotic fit/train      []  Orthotic recheck      []  Other      Total Treatment time 49 3        Electronically signed by:  ALEX Witt

## 2023-09-05 ENCOUNTER — HOSPITAL ENCOUNTER (OUTPATIENT)
Dept: OCCUPATIONAL THERAPY | Age: 48
Setting detail: THERAPIES SERIES
Discharge: HOME | End: 2023-09-05
Payer: MEDICAID

## 2023-09-06 ENCOUNTER — HOSPITAL ENCOUNTER (OUTPATIENT)
Dept: OCCUPATIONAL THERAPY | Age: 48
Setting detail: THERAPIES SERIES
Discharge: HOME | End: 2023-09-06
Payer: MEDICAID

## 2023-09-06 NOTE — FLOWSHEET NOTE
[x] [address] [facility]       Occupational Therapy            1st floor       [address], [place]         Phone: [phone]       Fax: [phone] [] [address] [facility] Occupational Therapy  [address] [place] [address]  Phone: [phone]  Fax: [phone]      Occupational Therapy Daily Treatment Note    Date:  2023  Patient Name:  Miranda Cash    :  1975  MRN: [id number]  Referring Provider:  Gui Salvador DO  Insurance: Cassy Sen,  Insurance approval was received for Physical Therapy from [facility] on 23. Approval was received for 12 visits, from 23 to 10/31/23. Authorization number [id number].   [id number] Approved 48 Units  [id number] Approved 48 Units  Medical Diagnosis: B27.250 left wrist pain    Rehab Codes: pain in hand M79.646,, pain in wrist M25.53,, or parasthesia of skin R20.2,  Onset Date: 4/15/23                 Next Dr. Pham Valle: NA- return PRN  Visit# / total visits: ; Progress note for patient completed this date at visit 6    Cancels/No Shows: 1/0      Subjective:    Pain:  [] Yes  [x] No Location:  N/A Pain Rating: (0-10 scale) 0/10  Pain altered Tx:  [x] No  [] Yes  Action:  Pt Comments:  I feels okay and I know it is getting stronger. Precautions: NONE  Surgery procedure and date: per eval Patient referred to outpatient OT for left wrist pain sustained on 4/15/23 from a fall at standing height, opted for surgical intervention ORIF but failed to show. Patient was outside of an adult establishment, denies intoxication, LOC or hitting his head at the time of the fall. Patient presented to the ED where he was evaluated, reduced and placed in a sugar-tong splint. At most recent office visit on 23, patient is able to perform activities as tolerated with LUE, off work until 23 and may return without any restrictions at that time. Objective:   Today's Treatment:  Modalities:  Exercises:  EXERCISE    REPS/     TIME  WEIGHT/    LEVEL

## 2023-09-07 ENCOUNTER — HOSPITAL ENCOUNTER (OUTPATIENT)
Dept: OCCUPATIONAL THERAPY | Age: 48
Setting detail: THERAPIES SERIES
Discharge: HOME | End: 2023-09-07
Payer: MEDICAID

## 2023-09-07 PROCEDURE — 97110 THERAPEUTIC EXERCISES: CPT

## 2023-09-07 NOTE — FLOWSHEET NOTE
COMMENTS   AROM 10x  Not Completed   Wrist ROM   F/E  Uln/Rad  Circles  20x 2lb  Not Completed (& HEP ABC'S))   Ergonomic  20x 1 green & 1 pink band  Not Completed   Hand  15 x 50lb  Added Resistance &  Completed    Digi    & isolate    25x ea BLUE  Completed   9/7/23 isolate only   Web   Push/pull/twist  20 ea    Green  Completed    Foam W/clothespins  3 jaw Blue 1 x  Tip Green 1x & lateral  1x 1xe Blue & green  Not Completed    Grooved peg  In by 1's  Out by 3's 1x  Not Completed   [place] transition  2x  Not Completed     wrist twister w/clip   ~10x 2.0 lb Completed   Sitting & standing   Lift & Carry:  1.~ 3lb wt bag vs wt    and place   (Simulate carrying garbage bag)  2. Medicine ball 9.2lb  3. Box various   ~30 reps various heights  Completed      White sharon scoop supinate and pour narrow cone  1x  Not Completed    Flex bar  Wrist f/e  Shoulder U/R  Sup/Pron  20x  Green  Completed    Other:      Assessment: [x] Progressing toward goals. Pt completed assigned therapeutic exercises with set up and verbal cues for pacing at times. Pt requested to limit treatment time/session to allow him to walk to next appointment. [] No change. [] Other     [x] Patient would continue to benefit from skilled occupational therapy services in order to: Patient would benefit from skilled occupational therapy services in order to:  Improve  Strength and Complaint of pain in order to Ensure safe return to work, decrease pain in UE for safe completion of ADLs, and return to PLOF        STG/LTG    Short Term Goals: (  6    Treatments)  Decrease pain to 4/10 with active use MET  Increase L  strength to 35 pounds and all L pinches by 5 pounds for ease with work related tasks MET except 2 point   Increase function:UE Functional Index Score 40/80 or more points to promote increased functional abilities  9/6/23 MET 78/80  Patient will report a decrease in paraesthesia symptoms by 50% MET  Patient to be

## 2023-09-11 ENCOUNTER — TELEPHONE (OUTPATIENT)
Dept: ORTHOPEDIC SURGERY | Age: 48
End: 2023-09-11

## 2023-09-11 ENCOUNTER — HOSPITAL ENCOUNTER (OUTPATIENT)
Dept: OCCUPATIONAL THERAPY | Age: 48
Setting detail: THERAPIES SERIES
Discharge: HOME | End: 2023-09-11
Payer: MEDICAID

## 2023-09-11 NOTE — TELEPHONE ENCOUNTER
Pt called in wanted to make an appt with Dr. Herson Perez to follow up after PT. Advised 1st aval appt ws 10/02/23 which pt was not happy with states is about done with PT and needs to get clearance from Dr. 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      Please call pt to see if we are able to work pt in sooner @ [phone]

## 2023-09-11 NOTE — TELEPHONE ENCOUNTER
Patient was requesting a RTW letter, which he was already provided at last appointment. Patient states that is all he needed and so declined appointment. Will call if he needs anything else.